# Patient Record
Sex: FEMALE | Race: WHITE | URBAN - METROPOLITAN AREA
[De-identification: names, ages, dates, MRNs, and addresses within clinical notes are randomized per-mention and may not be internally consistent; named-entity substitution may affect disease eponyms.]

---

## 2017-01-01 ENCOUNTER — HOSPITAL ENCOUNTER (INPATIENT)
Dept: MEDSURG UNIT | Facility: CLINIC | Age: 82
Discharge: NURSING, REHAB OR LONG-TERM CARE | End: 2017-07-19
Attending: FAMILY MEDICINE | Admitting: FAMILY MEDICINE

## 2017-01-01 ENCOUNTER — MEDICAL CORRESPONDENCE (OUTPATIENT)
Dept: HEALTH INFORMATION MANAGEMENT | Facility: CLINIC | Age: 82
End: 2017-01-01

## 2017-01-01 ENCOUNTER — OFFICE VISIT (OUTPATIENT)
Dept: FAMILY MEDICINE | Facility: CLINIC | Age: 82
End: 2017-01-01

## 2017-01-01 ENCOUNTER — DOCUMENTATION ONLY (OUTPATIENT)
Dept: FAMILY MEDICINE | Facility: CLINIC | Age: 82
End: 2017-01-01

## 2017-01-01 ENCOUNTER — ANESTHESIA - HEALTHEAST (OUTPATIENT)
Dept: SURGERY | Facility: CLINIC | Age: 82
End: 2017-01-01

## 2017-01-01 ENCOUNTER — SURGERY - HEALTHEAST (OUTPATIENT)
Dept: SURGERY | Facility: CLINIC | Age: 82
End: 2017-01-01

## 2017-01-01 ENCOUNTER — TRANSFERRED RECORDS (OUTPATIENT)
Dept: HEALTH INFORMATION MANAGEMENT | Facility: CLINIC | Age: 82
End: 2017-01-01

## 2017-01-01 ENCOUNTER — TELEPHONE (OUTPATIENT)
Dept: FAMILY MEDICINE | Facility: CLINIC | Age: 82
End: 2017-01-01

## 2017-01-01 ENCOUNTER — OFFICE VISIT - HEALTHEAST (OUTPATIENT)
Dept: GERIATRICS | Facility: CLINIC | Age: 82
End: 2017-01-01

## 2017-01-01 VITALS
TEMPERATURE: 97.8 F | SYSTOLIC BLOOD PRESSURE: 105 MMHG | DIASTOLIC BLOOD PRESSURE: 67 MMHG | OXYGEN SATURATION: 92 % | HEART RATE: 88 BPM

## 2017-01-01 VITALS — DIASTOLIC BLOOD PRESSURE: 65 MMHG | SYSTOLIC BLOOD PRESSURE: 111 MMHG | HEART RATE: 73 BPM | TEMPERATURE: 98.1 F

## 2017-01-01 DIAGNOSIS — N18.9 CHRONIC KIDNEY DISEASE: ICD-10-CM

## 2017-01-01 DIAGNOSIS — J44.9 CHRONIC OBSTRUCTIVE PULMONARY DISEASE, UNSPECIFIED COPD TYPE (H): Primary | ICD-10-CM

## 2017-01-01 DIAGNOSIS — K21.9 GASTROESOPHAGEAL REFLUX DISEASE WITHOUT ESOPHAGITIS: ICD-10-CM

## 2017-01-01 DIAGNOSIS — F33.9 EPISODE OF RECURRENT MAJOR DEPRESSIVE DISORDER, UNSPECIFIED DEPRESSION EPISODE SEVERITY (H): Primary | ICD-10-CM

## 2017-01-01 DIAGNOSIS — I10 ESSENTIAL HYPERTENSION, BENIGN: ICD-10-CM

## 2017-01-01 DIAGNOSIS — Z99.81 DEPENDENCE ON SUPPLEMENTAL OXYGEN: ICD-10-CM

## 2017-01-01 DIAGNOSIS — J44.9 COPD, MODERATE (H): Primary | ICD-10-CM

## 2017-01-01 DIAGNOSIS — I25.10 ATHEROSCLEROSIS OF CORONARY ARTERY OF NATIVE HEART WITHOUT ANGINA PECTORIS, UNSPECIFIED VESSEL OR LESION TYPE: ICD-10-CM

## 2017-01-01 DIAGNOSIS — Z90.5 SINGLE KIDNEY: ICD-10-CM

## 2017-01-01 DIAGNOSIS — R53.83 FATIGUE, UNSPECIFIED TYPE: Primary | ICD-10-CM

## 2017-01-01 DIAGNOSIS — J44.9 MODERATE COPD (CHRONIC OBSTRUCTIVE PULMONARY DISEASE) (H): ICD-10-CM

## 2017-01-01 DIAGNOSIS — N39.41 URGE INCONTINENCE OF URINE: Primary | ICD-10-CM

## 2017-01-01 DIAGNOSIS — J44.9 COPD, MODERATE (H): ICD-10-CM

## 2017-01-01 DIAGNOSIS — R35.89 POLYURIA: ICD-10-CM

## 2017-01-01 DIAGNOSIS — Z86.79 HISTORY OF HYPERTENSION: ICD-10-CM

## 2017-01-01 DIAGNOSIS — S72.002D HIP FRACTURE, LEFT, CLOSED, WITH ROUTINE HEALING, SUBSEQUENT ENCOUNTER: ICD-10-CM

## 2017-01-01 DIAGNOSIS — M19.90 OSTEOARTHRITIS, UNSPECIFIED OSTEOARTHRITIS TYPE, UNSPECIFIED SITE: ICD-10-CM

## 2017-01-01 DIAGNOSIS — R45.89 DEPRESSED MOOD: ICD-10-CM

## 2017-01-01 DIAGNOSIS — R53.1 GENERALIZED WEAKNESS: ICD-10-CM
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
## 2017-01-01 DIAGNOSIS — I10 BENIGN ESSENTIAL HYPERTENSION: ICD-10-CM

## 2017-01-01 DIAGNOSIS — D62 ACUTE BLOOD LOSS ANEMIA: ICD-10-CM

## 2017-01-01 DIAGNOSIS — G25.81 RESTLESS LEGS SYNDROME (RLS): ICD-10-CM

## 2017-01-01 DIAGNOSIS — J43.1 PANLOBULAR EMPHYSEMA (H): ICD-10-CM

## 2017-01-01 DIAGNOSIS — G40.909 SEIZURE DISORDER (H): ICD-10-CM

## 2017-01-01 DIAGNOSIS — R07.9 CHEST PAIN, UNSPECIFIED TYPE: ICD-10-CM

## 2017-01-01 DIAGNOSIS — E87.0 HYPERNATREMIA: ICD-10-CM

## 2017-01-01 DIAGNOSIS — D53.9 MACROCYTIC ANEMIA: ICD-10-CM

## 2017-01-01 DIAGNOSIS — J95.821 RESPIRATORY FAILURE, POST-OPERATIVE (H): ICD-10-CM

## 2017-01-01 DIAGNOSIS — D32.0 BENIGN NEOPLASM OF CEREBRAL MENINGES (H): ICD-10-CM

## 2017-01-01 DIAGNOSIS — J93.9 PNEUMOTHORAX ON RIGHT: ICD-10-CM

## 2017-01-01 DIAGNOSIS — S72.002A HIP FRACTURE, LEFT, CLOSED, INITIAL ENCOUNTER (H): ICD-10-CM

## 2017-01-01 DIAGNOSIS — D69.6 THROMBOCYTOPENIA (H): ICD-10-CM

## 2017-01-01 LAB
ALBUMIN SERPL-MCNC: 4 MG/DL (ref 3.3–4.9)
ALP SERPL-CCNC: 77.8 U/L (ref 40–150)
ALT SERPL-CCNC: 0 U/L (ref 0–45)
AORTIC ROOT: 3.1 CM
AORTIC VALVE MEAN VELOCITY: 89.8 CM/S
ASCENDING AORTA: 3.3 CM
AST SERPL-CCNC: 11.2 U/L (ref 0–45)
ATRIAL RATE - MUSE: 103 BPM
ATRIAL RATE - MUSE: 55 BPM
ATRIAL RATE - MUSE: 90 BPM
ATRIAL RATE - MUSE: 91 BPM
ATRIAL RATE - MUSE: 93 BPM
AV DIMENSIONLESS INDEX VTI: 0.9
AV MEAN GRADIENT: 4 MMHG
AV PEAK GRADIENT: 11.2 MMHG
AV VALVE AREA: 2.8 CM2
AV VELOCITY RATIO: 0.8
BACTERIA: NORMAL
BASOPHILS # BLD AUTO: 0 THOU/UL (ref 0–0.2)
BASOPHILS NFR BLD AUTO: 0 % (ref 0–2)
BASOPHILS NFR BLD AUTO: 0 % (ref 0–2)
BASOPHILS NFR BLD AUTO: 1 % (ref 0–2)
BILIRUB SERPL-MCNC: 0.4 MG/DL (ref 0.2–1.3)
BILIRUBIN UR: NEGATIVE
BLD PROD TYP BPU: NORMAL
BLOOD EXPIRATION DATE: NORMAL
BLOOD TYPE: 5100
BLOOD UR: NEGATIVE
BSA FOR ECHO PROCEDURE: 1.75 M2
BUN SERPL-MCNC: 17.4 MG/DL (ref 7–19)
BUN SERPL-MCNC: 26.7 MG/DL (ref 7–19)
CALCIUM SERPL-MCNC: 8.6 MG/DL (ref 8.5–10.1)
CALCIUM SERPL-MCNC: 8.9 MG/DL (ref 8.5–10.1)
CASTS: NORMAL /LPF
CHLORIDE SERPLBLD-SCNC: 100.7 MMOL/L (ref 98–110)
CHLORIDE SERPLBLD-SCNC: 101.6 MMOL/L (ref 98–110)
CHOLEST SERPL-MCNC: 181.9 MG/DL (ref 0–200)
CHOLEST/HDLC SERPL: 3.3 {RATIO} (ref 0–5)
CO2 SERPL-SCNC: 29.3 MMOL/L (ref 20–32)
CO2 SERPL-SCNC: 30.4 MMOL/L (ref 20–32)
CODING SYSTEM: NORMAL
COMPONENT (HISTORICAL CONVERSION): NORMAL
CREAT SERPL-MCNC: 1.5 MG/DL (ref 0.5–1)
CREAT SERPL-MCNC: 1.9 MG/DL (ref 0.5–1)
CROSSMATCH: NORMAL
CRYSTAL URINE: NORMAL /LPF
CULTURE: ABNORMAL
CV BLOOD PRESSURE: NORMAL MMHG
CV ECHO HEIGHT: 66 IN
CV ECHO WEIGHT: 149 LBS
DIASTOLIC BLOOD PRESSURE - MUSE: NORMAL MMHG
DOP CALC AO PEAK VEL: 167 CM/S
DOP CALC AO VTI: 37.3 CM
DOP CALC LVOT AREA: 3.14 CM2
DOP CALC LVOT DIAMETER: 2 CM
DOP CALC LVOT PEAK VEL: 134 CM/S
DOP CALC LVOT STROKE VOLUME: 105.8 CM3
DOP CALCLVOT PEAK VEL VTI: 33.7 CM
EJECTION FRACTION: 71 % (ref 55–75)
EOSINOPHIL # BLD AUTO: 0 THOU/UL (ref 0–0.4)
EOSINOPHIL # BLD AUTO: 0.1 THOU/UL (ref 0–0.4)
EOSINOPHIL # BLD AUTO: 0.1 THOU/UL (ref 0–0.4)
EOSINOPHIL NFR BLD AUTO: 1 % (ref 0–6)
EOSINOPHIL NFR BLD AUTO: 1 % (ref 0–6)
EOSINOPHIL NFR BLD AUTO: 3 % (ref 0–6)
EPITHELIAL CELLS UR: NORMAL /LPF (ref 0–2)
ERYTHROCYTE [DISTWIDTH] IN BLOOD BY AUTOMATED COUNT: 12.9 % (ref 11–14.5)
ERYTHROCYTE [DISTWIDTH] IN BLOOD BY AUTOMATED COUNT: 13.2 % (ref 11–14.5)
ERYTHROCYTE [DISTWIDTH] IN BLOOD BY AUTOMATED COUNT: 13.7 % (ref 11–14.5)
FOLATE SERPL-MCNC: 5.9 NG/ML
FRACTIONAL SHORTENING: 34.8 % (ref 28–44)
GFR SERPL CREATININE-BSD FRML MDRD: 26.9 ML/MIN/1.7 M2
GFR SERPL CREATININE-BSD FRML MDRD: 35.2 ML/MIN/1.7 M2
GLUCOSE SERPL-MCNC: 105.2 MG'DL (ref 70–99)
GLUCOSE SERPL-MCNC: 94.1 MG'DL (ref 70–99)
GLUCOSE URINE: NEGATIVE
HCT VFR BLD AUTO: 27.7 % (ref 35–47)
HCT VFR BLD AUTO: 31.7 % (ref 35–47)
HCT VFR BLD AUTO: 34 % (ref 35–47)
HDLC SERPL-MCNC: 54.7 MG/DL
HGB BLD-MCNC: 10.4 G/DL (ref 12–16)
HGB BLD-MCNC: 8.4 G/DL (ref 12–16)
HGB BLD-MCNC: 9.8 G/DL (ref 12–16)
INTERPRETATION ECG - MUSE: NORMAL
INTERVENTRICULAR SEPTUM IN END DIASTOLE: 1.08 CM (ref 0.6–0.9)
ISSUE DATE AND TIME: NORMAL
IVS/PW RATIO: 1
KETONES UR QL: NEGATIVE
LA AREA 1: 17 CM2
LA AREA 2: 19.8 CM2
LAB AP CHARGES (HE HISTORICAL CONVERSION): NORMAL
LDLC SERPL CALC-MCNC: 104 MG/DL (ref 0–129)
LEFT ATRIUM LENGTH: 5 CM
LEFT ATRIUM SIZE: 3.4 CM
LEFT ATRIUM VOLUME INDEX: 32.7 ML/M2
LEFT ATRIUM VOLUME: 57.2 CM3
LEFT VENTRICLE CARDIAC INDEX: 3.3 L/MIN/M2
LEFT VENTRICLE CARDIAC OUTPUT: 5.7 L/MIN
LEFT VENTRICLE DIASTOLIC VOLUME INDEX: 43.5 CM3/M2 (ref 34–74)
LEFT VENTRICLE DIASTOLIC VOLUME: 76.1 CM3 (ref 46–106)
LEFT VENTRICLE HEART RATE: 54 BPM
LEFT VENTRICLE MASS INDEX: 97.6 G/M2
LEFT VENTRICLE SYSTOLIC VOLUME INDEX: 12.5 CM3/M2 (ref 11–31)
LEFT VENTRICLE SYSTOLIC VOLUME: 21.9 CM3 (ref 14–42)
LEFT VENTRICULAR INTERNAL DIMENSION IN DIASTOLE: 4.45 CM (ref 3.8–5.2)
LEFT VENTRICULAR INTERNAL DIMENSION IN SYSTOLE: 2.9 CM (ref 2.2–3.5)
LEFT VENTRICULAR MASS: 170.9 G
LEFT VENTRICULAR OUTFLOW TRACT MEAN GRADIENT: 3 MMHG
LEFT VENTRICULAR OUTFLOW TRACT MEAN VELOCITY: 85.4 CM/S
LEFT VENTRICULAR OUTFLOW TRACT PEAK GRADIENT: 7 MMHG
LEFT VENTRICULAR POSTERIOR WALL IN END DIASTOLE: 1.11 CM (ref 0.6–0.9)
LEUKOCYTE ESTERASE UR: ABNORMAL
LV STROKE VOLUME INDEX: 60.5 ML/M2
LYMPHOCYTES # BLD AUTO: 0.5 THOU/UL (ref 0.8–4.4)
LYMPHOCYTES # BLD AUTO: 1.1 THOU/UL (ref 0.8–4.4)
LYMPHOCYTES # BLD AUTO: 1.3 THOU/UL (ref 0.8–4.4)
LYMPHOCYTES NFR BLD AUTO: 10 % (ref 20–40)
LYMPHOCYTES NFR BLD AUTO: 25 % (ref 20–40)
LYMPHOCYTES NFR BLD AUTO: 26 % (ref 20–40)
MCH RBC QN AUTO: 31.7 PG (ref 27–34)
MCH RBC QN AUTO: 32.6 PG (ref 27–34)
MCH RBC QN AUTO: 32.9 PG (ref 27–34)
MCHC RBC AUTO-ENTMCNC: 30.3 G/DL (ref 32–36)
MCHC RBC AUTO-ENTMCNC: 30.6 G/DL (ref 32–36)
MCHC RBC AUTO-ENTMCNC: 30.9 G/DL (ref 32–36)
MCV RBC AUTO: 104 FL (ref 80–100)
MCV RBC AUTO: 105 FL (ref 80–100)
MCV RBC AUTO: 109 FL (ref 80–100)
MITRAL VALVE E/A RATIO: 0.9
MONOCYTES # BLD AUTO: 0.4 THOU/UL (ref 0–0.9)
MONOCYTES NFR BLD AUTO: 8 % (ref 2–10)
MONOCYTES NFR BLD AUTO: 8 % (ref 2–10)
MONOCYTES NFR BLD AUTO: 9 % (ref 2–10)
MUCOUS URINE: NORMAL LPF
MV AVERAGE E/E' RATIO: 16.1 CM/S
MV DECELERATION TIME: 261 MS
MV E'TISSUE VEL-LAT: 8.16 CM/S
MV E'TISSUE VEL-MED: 4.9 CM/S
MV LATERAL E/E' RATIO: 12.9
MV MEDIAL E/E' RATIO: 21.4
MV PEAK A VELOCITY: 119 CM/S
MV PEAK E VELOCITY: 105 CM/S
NEUTROPHILS # BLD AUTO: 2.7 THOU/UL (ref 2–7.7)
NEUTROPHILS # BLD AUTO: 3.1 THOU/UL (ref 2–7.7)
NEUTROPHILS # BLD AUTO: 4.4 THOU/UL (ref 2–7.7)
NEUTROPHILS NFR BLD AUTO: 63 % (ref 50–70)
NEUTROPHILS NFR BLD AUTO: 64 % (ref 50–70)
NEUTROPHILS NFR BLD AUTO: 82 % (ref 50–70)
NITRITE UR QL STRIP: POSITIVE
NUC REST DIASTOLIC VOLUME INDEX: 2385.6 LBS
NUC REST SYSTOLIC VOLUME INDEX: 66 IN
P AXIS - MUSE: -74 DEGREES
P AXIS - MUSE: 70 DEGREES
P AXIS - MUSE: 71 DEGREES
P AXIS - MUSE: 79 DEGREES
P AXIS - MUSE: 88 DEGREES
PATH REPORT.COMMENTS IMP SPEC: NORMAL
PATH REPORT.COMMENTS IMP SPEC: NORMAL
PATH REPORT.FINAL DX SPEC: NORMAL
PATH REPORT.MICROSCOPIC SPEC OTHER STN: ABNORMAL
PATH REPORT.MICROSCOPIC SPEC OTHER STN: NORMAL
PATH REPORT.RELEVANT HX SPEC: NORMAL
PH UR STRIP: 5.5 [PH] (ref 5–7)
PLATELET # BLD AUTO: 125 THOU/UL (ref 140–440)
PLATELET # BLD AUTO: 125 THOU/UL (ref 140–440)
PLATELET # BLD AUTO: 147 THOU/UL (ref 140–440)
PMV BLD AUTO: 10.3 FL (ref 8.5–12.5)
PMV BLD AUTO: 10.5 FL (ref 8.5–12.5)
PMV BLD AUTO: 11.4 FL (ref 8.5–12.5)
POTASSIUM SERPL-SCNC: 4.7 MMOL/DL (ref 3.2–4.6)
POTASSIUM SERPL-SCNC: 5.1 MMOL/DL (ref 3.2–4.6)
PR INTERVAL - MUSE: 144 MS
PR INTERVAL - MUSE: 148 MS
PR INTERVAL - MUSE: 164 MS
PR INTERVAL - MUSE: 168 MS
PR INTERVAL - MUSE: 98 MS
PROT SERPL-MCNC: 6.1 G/DL (ref 6.8–8.8)
PROTEIN UR: ABNORMAL
QRS DURATION - MUSE: 70 MS
QRS DURATION - MUSE: 80 MS
QRS DURATION - MUSE: 84 MS
QRS DURATION - MUSE: 88 MS
QRS DURATION - MUSE: 92 MS
QT - MUSE: 366 MS
QT - MUSE: 372 MS
QT - MUSE: 372 MS
QT - MUSE: 424 MS
QT - MUSE: 504 MS
QTC - MUSE: 455 MS
QTC - MUSE: 455 MS
QTC - MUSE: 457 MS
QTC - MUSE: 482 MS
QTC - MUSE: 555 MS
R AXIS - MUSE: 46 DEGREES
R AXIS - MUSE: 49 DEGREES
R AXIS - MUSE: 63 DEGREES
R AXIS - MUSE: 69 DEGREES
R AXIS - MUSE: 84 DEGREES
RBC # BLD AUTO: 2.55 MILL/UL (ref 3.8–5.4)
RBC # BLD AUTO: 3.01 MILL/UL (ref 3.8–5.4)
RBC # BLD AUTO: 3.28 MILL/UL (ref 3.8–5.4)
RBC URINE: NORMAL /HPF
SODIUM SERPL-SCNC: 138.2 MMOL/L (ref 132–142)
SODIUM SERPL-SCNC: 138.4 MMOL/L (ref 132–142)
SP GR UR STRIP: 1.01
STATUS (HISTORICAL CONVERSION): NORMAL
SYSTOLIC BLOOD PRESSURE - MUSE: NORMAL MMHG
T AXIS - MUSE: -75 DEGREES
T AXIS - MUSE: 42 DEGREES
T AXIS - MUSE: 54 DEGREES
T AXIS - MUSE: 59 DEGREES
T AXIS - MUSE: 81 DEGREES
TRICUSPID VALVE ANULAR PLANE SYSTOLIC EXCURSION: 2.5 CM
TRIGL SERPL-MCNC: 118.1 MG/DL (ref 0–150)
TSH SERPL DL<=0.05 MIU/L-ACNC: 1.6 UIU/ML (ref 0.3–5)
UNIT ABO/RH (HISTORICAL CONVERSION): NORMAL
UNIT NUMBER: NORMAL
UROBILINOGEN UR STRIP-ACNC: ABNORMAL
VENTRICULAR RATE- MUSE: 103 BPM
VENTRICULAR RATE- MUSE: 55 BPM
VENTRICULAR RATE- MUSE: 90 BPM
VENTRICULAR RATE- MUSE: 91 BPM
VENTRICULAR RATE- MUSE: 93 BPM
VIT B12 SERPL-MCNC: 226 PG/ML (ref 213–816)
VLDL CHOLESTEROL: 23.6 MG/DL (ref 7–32)
WBC # BLD AUTO: 4.3 THOU/UL (ref 4–11)
WBC # BLD AUTO: 4.9 THOU/UL (ref 4–11)
WBC URINE: NORMAL /HPF
WBC: 5.4 THOU/UL (ref 4–11)

## 2017-01-01 RX ORDER — ROPINIROLE 2 MG/1
TABLET, FILM COATED ORAL
Qty: 90 TABLET | Refills: 3 | Status: SHIPPED | OUTPATIENT
Start: 2017-01-01

## 2017-01-01 RX ORDER — ROPINIROLE 0.5 MG/1
TABLET, FILM COATED ORAL
Qty: 90 TABLET | Refills: 3 | Status: SHIPPED | OUTPATIENT
Start: 2017-01-01

## 2017-01-01 RX ORDER — ESCITALOPRAM OXALATE 10 MG/1
10 TABLET ORAL DAILY
Qty: 30 TABLET | Refills: 1 | Status: SHIPPED | OUTPATIENT
Start: 2017-01-01

## 2017-01-01 RX ORDER — ALBUTEROL SULFATE 90 UG/1
AEROSOL, METERED RESPIRATORY (INHALATION)
Qty: 1 INHALER | Refills: 11 | Status: SHIPPED | OUTPATIENT
Start: 2017-01-01

## 2017-01-01 RX ORDER — ACETAMINOPHEN 500 MG
500-1000 TABLET ORAL 3 TIMES DAILY PRN
Qty: 180 TABLET | Refills: 11 | Status: SHIPPED | OUTPATIENT
Start: 2017-01-01

## 2017-01-01 RX ORDER — IPRATROPIUM BROMIDE AND ALBUTEROL SULFATE 2.5; .5 MG/3ML; MG/3ML
1 SOLUTION RESPIRATORY (INHALATION) EVERY 6 HOURS PRN
Qty: 360 ML | Refills: 11 | Status: SHIPPED | OUTPATIENT
Start: 2017-01-01

## 2017-01-01 RX ORDER — SULFAMETHOXAZOLE/TRIMETHOPRIM 800-160 MG
1 TABLET ORAL 2 TIMES DAILY
Qty: 14 TABLET | Refills: 0 | Status: SHIPPED | OUTPATIENT
Start: 2017-01-01

## 2017-01-01 RX ORDER — TOLTERODINE 2 MG/1
2 CAPSULE, EXTENDED RELEASE ORAL DAILY
Qty: 30 CAPSULE | Refills: 1 | Status: SHIPPED | OUTPATIENT
Start: 2017-01-01

## 2017-01-01 RX ORDER — LEVETIRACETAM 500 MG/1
500 TABLET ORAL 2 TIMES DAILY
Qty: 60 TABLET | Refills: 3 | Status: SHIPPED | OUTPATIENT
Start: 2017-01-01

## 2017-01-01 RX ORDER — ACETAMINOPHEN 500 MG
500-1000 TABLET ORAL 3 TIMES DAILY PRN
Qty: 180 TABLET | Refills: 11 | Status: SHIPPED | OUTPATIENT
Start: 2017-01-01 | End: 2017-01-01

## 2017-01-01 RX ORDER — BUDESONIDE AND FORMOTEROL FUMARATE DIHYDRATE 80; 4.5 UG/1; UG/1
2 AEROSOL RESPIRATORY (INHALATION) 2 TIMES DAILY
Qty: 1 INHALER | Refills: 11 | Status: SHIPPED | OUTPATIENT
Start: 2017-01-01

## 2017-01-01 RX ORDER — TIOTROPIUM BROMIDE 18 UG/1
CAPSULE ORAL; RESPIRATORY (INHALATION)
Qty: 30 CAPSULE | Refills: 11 | Status: SHIPPED | OUTPATIENT
Start: 2017-01-01

## 2017-01-01 RX ORDER — AMLODIPINE BESYLATE 10 MG/1
10 TABLET ORAL DAILY
Qty: 90 TABLET | Refills: 3 | Status: SHIPPED | OUTPATIENT
Start: 2017-01-01 | End: 2017-01-01

## 2017-01-01 ASSESSMENT — MIFFLIN-ST. JEOR
SCORE: 1148.48
SCORE: 1107.65
SCORE: 1128.06
SCORE: 1148.48
SCORE: 1159.36
SCORE: 1177.96
SCORE: 1128.06
SCORE: 1143.94
SCORE: 1115.82
SCORE: 1143.94
SCORE: 1107.65
SCORE: 1115.82
SCORE: 1177.96
SCORE: 1159.36

## 2017-01-11 NOTE — PROGRESS NOTES
Subjective: Jhoana Ramirez is a 82 year old who is seen at home for follow up visit today.  Seen at [address] [address] .    Also present at visit include: none  Acute concerns today include:     Breathing: she has a history of COPD and says he breathing is steadily worsening.  She has not had any major exacerbations in the past several months, but has felt more short of breath when walking around.  She uses her duo-neb machine 2-3 times/day which helps.  She has a chronic cough that is not worse lately.  She still smokes 1/2 ppd and says she knows she needs to quit.  Balance: she says she has had trouble walking around for a long time due to her balance.  Fortunately, her scooter was approved 1 month ago and she has in it her apartment now which has been very helpful.    Chronic and Past Medical Problems include:  Patient Active Problem List   Diagnosis     Acute myocardial infarction (H)     Essential hypertension, benign     Coronary atherosclerosis     Depressive disorder, not elsewhere classified     Esophageal reflux     Female stress incontinence     Benign neoplasm of cerebral meninges (H)     Tobacco use disorder     Osteoarthritis     Restless legs syndrome (RLS)     Health Care Home     COPD, moderate (H)     Macrocytic anemia     Family History        Negative family history of: DIABETES, Coronary Artery Disease, Hypertension, Hyperlipidemia, CEREBROVASCULAR DISEASE, Breast Cancer, Colon Cancer, Prostate Cancer, Other Cancer, Depression, Anxiety Disorder, MENTAL ILLNESS, Substance Abuse, Anesthesia Reaction, Asthma, OSTEOPOROSIS, Genetic Disorder, Thyroid Disease, Obesity, Unknown/Adopted        Social History:   Current activities:  Mainly stays in her home, does hang out with her son a lot when he is not working and visits with friends in her building  Support services:  PCA for 5-6 hours 6 days per week who helps with cooking, cleaning, dishes etc...  Currently living  family/friends:  Son lives with her  PMHX/PSHX/MEDS/ALLERGIES/SHX/FHX reviewed and updated in Epic.   MEDICATION LIST:  Current Outpatient Prescriptions   Medication     melatonin 3 MG tablet     levETIRAcetam (KEPPRA) 500 MG tablet     tolterodine (DETROL LA) 2 MG 24 hr capsule     albuterol (ALBUTEROL) 108 (90 BASE) MCG/ACT inhaler     ferrous sulfate (IRON) 325 (65 FE) MG tablet     amLODIPine (NORVASC) 10 MG tablet     rOPINIRole (REQUIP) 2 MG tablet     rOPINIRole (REQUIP) 0.5 MG tablet     Incontinence Supply Disposable (CONFIDENCE FITTED BRIEF REG) MISC     acetaminophen (TYLENOL) 500 MG tablet     ipratropium - albuterol 0.5 mg/2.5 mg/3 mL (DUONEB) 0.5-2.5 (3) MG/3ML nebulization     No current facility-administered medications for this visit.     Medications are managed by:  SELF, FAMILY, HOME NURSE  Patient uses a pill box to manage their medications:  Yes  Patient has questions, concerns, or potential side effects/interactions from their medications:  No  GERIATRIC ROS:    Do you have difficulty getting around, watching TV or reading because of poor eyesight? Yes / No   Can you hear normal conversational voice? Yes   Do you use hearing aides? No   Do you have problems with your memory? No   Do you often feel sad or depressed? No   Have you unintentionally lost weight in the last 6 months? No   Do you have trouble with control of your bladder? No   Do you have trouble with control of your bowels? No   Have you had any falls in the past year? No    GENERAL ROS:   General: No unexplained weight gain or loss; adequate sleep pattern.  Resp: No hemoptysis.  Worsening SOB as above, chronic cough  GI: No worsening constipation, no diarrhea, no nausea or vomiting  : Voiding independently with no significant incontinence   Skin: No areas of new skin breakdown or worrisome rashes  Extremities:  No pain, extremity weakness. +balance issues as above    Objective: Most recent weight:  158lbs  Gen: Adult female  "sitting on couch, NAD   HEENT: Head is atraumatic, decent dentition, O2 by NC in place  CV: RRR - no murmurs, rubs, or gallups,   Pulm: Mild expiratory wheezing throughout, no increased WOB  ABD: soft, nontender, BS intact  Extrem: no cyanosis, edema   Psych: Euthymic  Neuro: Pt is able to ambulate independently to scooter    Preventative Screening:   Vaccinations reviewed and up to date no - needs flu shot  Get up and Go test:  Not completed.  Additional Recommended Screening: none    Assessment/ Plan:  COPD: No acute exacerbation, but slowly worsening SOB over last year and is now up to 5-6L NC 24 hrs/day.  She says she has not been on maintenance inhalers because \"she only has one kidney.\"  Has an appointment with Dr. Lopez on 1/11/17, will have her discuss additional inhaler such as Symbicort at that time.  Consider repeat PFTs.  Balance issues: chronic problem, now able to get around better with her scooter.  No additional work-up at this time.    RECOMMENDED FOLLOW UP:  2 months.    Level of Service:  54555    Total of 30 minutes was spent in face to face contact with patient with > 50% in counseling and coordination of care.  Options for treatment and/or follow-up care were reviewed with the patient. Jhoana Ramirez was engaged and actively involved in the decision making process. She verbalized understanding of the options discussed and was satisfied with the final plan.    Seen with Dr. Moody.    Jose Ram, DO PGY2  [facility]    "

## 2017-01-11 NOTE — PROGRESS NOTES
Female Physical Note    Concerns today: increase oxygen, short of breath        ROS:  CONSTITUTIONAL: no fatigue, no unexpected change in weight  SKIN: no worrisome rashes, no worrisome moles, no worrisome lesions  EYES: no acute vision problems or changes  ENT: no ear problems, no mouth problems, no throat problems  RESP:sob with any exertion  CV: no chest pain, no palpitations, no new or worsening peripheral edema  GI: occasional heartburn, no n/v, no hematochezia   female: incontinence-urge and polyuria  MS: no claudication, no myalgias, no joint aches  NEURO: neuropathic type pain in her feet 2-3 times per week  ENDOCRINE: no temperature intolerance, no skin/hair changes  HEME: no easy bruising, no bleeding problems  PSYCHIATRIC: NEGATIVE for changes in mood or affect     No LMP recorded. Patient is postmenopausal.   STD History: Neg  Last Pap Smear Date: pt doesn't know   Abnormal Pap History: None    Patient Active Problem List   Diagnosis     Acute myocardial infarction (H)     Essential hypertension, benign     Coronary atherosclerosis     Depressive disorder, not elsewhere classified     Esophageal reflux     Female stress incontinence     Benign neoplasm of cerebral meninges (H)     Tobacco use disorder     Osteoarthritis     Restless legs syndrome (RLS)     Health Care Home     COPD, moderate (H)     Macrocytic anemia       Current Outpatient Prescriptions   Medication Sig Dispense Refill     tolterodine (DETROL LA) 2 MG 24 hr capsule Take 1 capsule (2 mg) by mouth daily 30 capsule 1     omeprazole (PRILOSEC) 20 MG CR capsule One pill by mouth daily 30 capsule 12     tiotropium (SPIRIVA HANDIHALER) 18 MCG capsule Inhale contents of one capsule daily. 30 capsule 11     melatonin 3 MG tablet Take 1 tablet (3 mg) by mouth nightly as needed for sleep 30 tablet 11     levETIRAcetam (KEPPRA) 500 MG tablet Take 1 tablet (500 mg) by mouth 2 times daily 60 tablet 3     albuterol (ALBUTEROL) 108 (90 BASE)  MCG/ACT inhaler Inhale 1 to 2 puff every 4 to 6 hours as needed 1 Inhaler 11     ferrous sulfate (IRON) 325 (65 FE) MG tablet Take 1 tablet (325 mg) by mouth daily (with breakfast) 30 tablet 2     amLODIPine (NORVASC) 10 MG tablet Take 1 tablet (10 mg) by mouth daily 90 tablet 3     rOPINIRole (REQUIP) 2 MG tablet Take 1 tablet  at bedtime along with 0.5 mg tablet for a total dose of 2.5mg daily 90 tablet 3     rOPINIRole (REQUIP) 0.5 MG tablet Take 1 tablet  at bedtime along with 2 mg tablet for a total dose of 2.5mg daily 90 tablet 3     acetaminophen (TYLENOL) 500 MG tablet Take 1-2 tablets (500-1,000 mg) by mouth 3 times daily as needed 180 tablet 11     ipratropium - albuterol 0.5 mg/2.5 mg/3 mL (DUONEB) 0.5-2.5 (3) MG/3ML nebulization Take 1 vial (3 mLs) by nebulization every 6 hours as needed 360 mL 11     Incontinence Supply Disposable (CONFIDENCE FITTED BRIEF REG) MISC Use on a daily basis for incontinence 10 each 99       Past Medical History   Diagnosis Date     COPD (chronic obstructive pulmonary disease) (H)      Hypertension      Malignant neoplasm (H)         Family History        Negative family history of: DIABETES, Coronary Artery Disease, Hypertension, Hyperlipidemia, CEREBROVASCULAR DISEASE, Breast Cancer, Colon Cancer, Prostate Cancer, Other Cancer, Depression, Anxiety Disorder, MENTAL ILLNESS, Substance Abuse, Anesthesia Reaction, Asthma, OSTEOPOROSIS, Genetic Disorder, Thyroid Disease, Obesity, Unknown/Adopted          Problem List Medication List and Allergy List were reviewed.    Patient is an established patient of this clinic..    Social History   Substance Use Topics     Smoking status: Former Smoker -- 1.00 packs/day     Types: Cigars     Smokeless tobacco: Not on file     Alcohol Use: No       Children ? yes - healthy    Has anyone hurt you physically, for example by pushing, hitting, slapping or kicking you or forcing you to have sex? Denies  Do you feel threatened or  controlled by a partner, ex-partner or anyone in your life? Denies    RISK BEHAVIORS AND HEALTHY HABITS:  Tobacco Use/Smokin-2 ppd  Illicit Drug Use: None  Do you use alcohol? No  Diet (5-7 servings of fruits/veg daily): No   Exercise (30 min accumulated most days):No  Dental Care: No   Calcium 1500 mg/d:  No  Seat Belt Use: Yes     Cholesterol Level (>46 yo or at risk):  Recommended and patient accepted testing. and Dexa Scan (women>65 yrs or risk = that of a 64yo woman):  Recommended and patient declined testing.  Breast CA Screening (>39 yo or 10 y before 1st degree relative diagnosis): Recommended and patient declined testing. and Lung CA Screening with low dose CT (55 - 80, with >= 30 ppy smoking history who are current smokers or quit within last 15y - annual low dose CT) Recommended and patient declined testing.  CV Risk based on Pooled Cohort Risk:pending   Pooled Cohort Risk Calculator    Immunization History   Administered Date(s) Administered     Influenza (IIV3) 10/01/2008, 2011     TD (ADULT, 7+) 2001     Tdap (Adacel,Boostrix) 2008    Reviewed Immunization Record Today    EXAMINATION:   /67 mmHg  Pulse 88  Temp(Src) 97.8  F (36.6  C) (Oral)  SpO2 92%  GENERAL: healthy, alert and no distress  EYES: Eyes grossly normal to inspection, extraocular movements - intact, and PERRL  HENT: ear canals and TM's normal, nose normal, oral mucosa edentulous with two small shallow ulcers on lower gumline  NECK: no tenderness, no adenopathy, no asymmetry, no masses, no stiffness; thyroid- normal to palpation  RESP: prolonged expiratory phase  CV: regular rates and rhythm, normal S1 S2, no S3 or S4 and no murmur, no click or rub -  ABDOMEN: soft, no tenderness, no  hepatosplenomegaly, no masses, normal bowel sounds  MS: extremities- no gross deformities noted, no edema  SKIN: no suspicious lesions, no rashes  NEURO: strength and tone- normal, sensory exam- grossly normal, mentation-  intact, speech- normal, reflexes- symmetric  BACK: no CVA tenderness, no paralumbar tenderness  PSYCH: Alert and oriented times 3; speech- coherent , normal rate and volume; able to articulate logical thoughts, able to abstract reason, no tangential thoughts, no hallucinations or delusions, affect- normal  LYMPHATICS: ant. cervical- normal, post. cervical- normal, axillary- normal, supraclavicular- normal, inguinal- normal    ASSESSMENT:  1. COPD  2. HTN  3. CAD  4. Polyuria  5. anemia    PLAN:  1. Check UA, CBC, BMP, and lipids  2. Start Spiriva, increase O2 to 4.5 L/hr as needed  3. Refill other meds  4. Will have team see pt on home visit schedule.

## 2017-01-12 NOTE — TELEPHONE ENCOUNTER
[facility] phone call message- general phone call:    Reason for call: the Pt called to ask what to do. The medication spiriva is a medication she cant take and needs a different one     Return call needed: Yes    OK to leave a message on voice mail? Yes    Primary language: English      needed? No    Call taken on January 12, 2017 at 12:52 PM by Fabien Leos

## 2017-01-12 NOTE — PROGRESS NOTES
Preceptor attestation:  Patient seen and discussed with the resident 1/10/2017.  Assessment and plan reviewed with resident and agreed upon.  Supervising physician: Mateus Moody  [facility]

## 2017-03-08 NOTE — PROGRESS NOTES
Subjective: Jhoana Ramirez is a 83 year old who is seen at home for follow up visit today.  Seen at [address] [facility]  [address] [place] [id number] .    Also present at visit include: none    Acute concerns today include: mood    - has had decreased mood and just felt blah overall for last few months. Decreased energy and desire to do things. There are some group activities where she lives but she does not have a lot in common with her neighbors she states. No trouble with sleep or appetite. Says she has not been treated for depression in the past. No thoughts of self harm.    - Hx COPD and says breathing is similar to how it was 2 months ago. No ER visits or hospitalizations for breathing over the winter. She can only walk around the room a few times before getting short of breath which she says is about the same as 2 months ago. She says she is on a few liters of O2 24 hr a day but looking at her machine it looks like she is on 4.5 L a day.   She still smokes about 1/2 ppd. Currently she is doing duo-nebs BID and albuterol inhaler 2-3 times a day. She did not start the Spiriva that she was given a few months ago because she does not think it will be safe with her one kidney.    Chronic and Past Medical Problems include:  Patient Active Problem List   Diagnosis     Acute myocardial infarction (H)     Essential hypertension, benign     Coronary atherosclerosis     Depressive disorder, not elsewhere classified     Esophageal reflux     Female stress incontinence     Benign neoplasm of cerebral meninges (H)     Tobacco use disorder     Osteoarthritis     Restless legs syndrome (RLS)     Health Care Home     COPD, moderate (H)     Macrocytic anemia     Family History        Negative family history of: DIABETES, Coronary Artery Disease, Hypertension, Hyperlipidemia, CEREBROVASCULAR DISEASE, Breast Cancer, Colon Cancer, Prostate Cancer, Other Cancer, Depression, Anxiety Disorder, MENTAL ILLNESS, Substance Abuse,  Anesthesia Reaction, Asthma, OSTEOPOROSIS, Genetic Disorder, Thyroid Disease, Obesity, Unknown/Adopted        Social History:   Current activities:  Mainly confined to her apartment  Support services:  PCA services daily for 5 hrs a day and son lives with her  Currently living family/friends:  lives with son  PMHX/PSHX/MEDS/ALLERGIES/SHX/FHX reviewed and updated in Epic.   MEDICATION LIST:  Current Outpatient Prescriptions   Medication     amLODIPine (NORVASC) 10 MG tablet     rOPINIRole (REQUIP) 0.5 MG tablet     rOPINIRole (REQUIP) 2 MG tablet     albuterol (ALBUTEROL) 108 (90 BASE) MCG/ACT Inhaler     sulfamethoxazole-trimethoprim (BACTRIM DS/SEPTRA DS) 800-160 MG per tablet     tolterodine (DETROL LA) 2 MG 24 hr capsule     omeprazole (PRILOSEC) 20 MG CR capsule     tiotropium (SPIRIVA HANDIHALER) 18 MCG capsule     melatonin 3 MG tablet     levETIRAcetam (KEPPRA) 500 MG tablet     ferrous sulfate (IRON) 325 (65 FE) MG tablet     Incontinence Supply Disposable (CONFIDENCE FITTED BRIEF REG) MISC     acetaminophen (TYLENOL) 500 MG tablet     ipratropium - albuterol 0.5 mg/2.5 mg/3 mL (DUONEB) 0.5-2.5 (3) MG/3ML nebulization     No current facility-administered medications for this visit.      Medications are managed by:  SELF, FAMILY, HOME NURSE  Patient uses a pill box to manage their medications:  Yes   Patient has questions, concerns, or potential side effects/interactions from their medications:  Yes (see above)  GERIATRIC ROS:    Do you have difficulty getting around, watching TV or reading because of poor eyesight?  No   Can you hear normal conversational voice? Yes    Do you use hearing aides? No   Do you have problems with your memory? Yes   Do you often feel sad or depressed? Yes  Have you unintentionally lost weight in the last 6 months?  No   Do you have trouble with control of your bladder? Yes, wears depends  Do you have trouble with control of your bowels?  No   Have you had any falls in the past  year? No    GENERAL ROS:   General: No unexplained weight gain or loss; adequate sleep pattern.  Resp: See above in HPI  GI: No worsening constipation, no diarrhea  : Incontinence is stable from last visit, still using depends  Skin: No areas of new skin breakdown or worrisome rashes  Extremities:  No pain, extremity weakness, has recently gotten scooter for balance troubles    Objective: HR 80, RR 18,   Gen: Well nourished and in NAD   HEENT: Head is atraumatic, average dentition  CV: RRR - no murmurs, rubs, or gallups,   Pulm: CTAB, no wheezes/rales/rhonchi, poor air movement overall  ABD: soft, nontender, BS intact  Extrem: no cyanosis, edema or clubbing   Psych: Euthymic  Neuro: no UE weakness.    Preventative Screening:   Vaccinations reviewed and needs pneumovax         Assessment/ Plan:    Major depressive disorder: Could be an element of seasonal affective disorder. Will start low dose Lexapro and off patient therapy if she desires. Also encourage patient to look into adult  activities if she'd be able to tolerate them with her breathing.    COPD: Would recommend that she restart Spiriva daily as I do not see on review of Up to Date that it should be a concern even with poor kidney function. Cont duo-nebs BID and albuterol inhaler as needed although hopefully wouldn't need albuterol inhaler as often is she would use Spiriva regularly    RECOMMENDED FOLLOW UP:  2 months.    Level of Service:  46841    Total of 40 minutes was spent in face to face contact with patient with > 50% in counseling and coordination of care.  Options for treatment and/or follow-up care were reviewed with the patient. Jhoana Ramirez was engaged and actively involved in the decision making process. She verbalized understanding of the options discussed and was satisfied with the final plan.    Staffed with Senthil Turner

## 2017-03-10 NOTE — PROGRESS NOTES
"Visit to the client's home for annual health risk assessment and PCA assessment on 03/09/17.  An  was not needed.    Current situation/living environment/hospitalization: Client lives in an apartment with her adult son. Home somewhat disheveled and smells heavily of smoke due to client smoking Discussed the risks of client smoking while using oxygen but client is not interested in cessation at this time.     Activities of daily living (ADL)/instrumental activities of daily living (IADL) and functional issues: D/t clients SOB and dizziness she is dependent with all IADL's and ADL's. She has PCA in place to meet this need and her adult son also lives with her and provides informal supports during the evening and at noc.     Health concerns/updates: Client reports she gets very SOB and dizzy with minimal exertion. As stated above she has assistance with all cares and SBA when she ambulates d/t fall risk. Her son helps her get in bed and adjusted so she can breathe better at night.     Cognition/mental health: Client admits she has been struggling with depression. She said she is disgusted with the way her life turned out and wishes she could do it all over again. She said she has been feeling \"blah\" for awhile. Discussed MH services but client declines. PCP was out as well and client started on new antidepressant. Client had medication filled at visit and started taking it.     Client's Plan of Care consists of:  PCA (10 hours a day), Supplies and equipment as needed and Extended supplies of lifeline.      Ania Ibarra RN  [facility] Care Coordinator  [phone]    "

## 2017-03-10 NOTE — PROGRESS NOTES
"Visit to the client's home for annual health risk assessment and PCA assessment on 03/09/17.  An  was not needed.    Current situation/living environment/hospitalization: Client lives in an apartment with her adult son. Home somewhat disheveled and smells heavily of smoke due to client smoking Discussed the risks of client smoking while using oxygen but client is not interested in cessation at this time.     Activities of daily living (ADL)/instrumental activities of daily living (IADL) and functional issues: D/t clients SOB and dizziness she is dependent with all IADL's and ADL's. She has PCA in place to meet this need and her adult son also lives with her and provides informal supports during the evening and at noc.     Health concerns/updates: Client reports she gets very SOB and dizzy with minimal exertion. As stated above she has assistance with all cares and SBA when she ambulates d/t fall risk. Her son helps her get in bed and adjusted so she can breathe better at night.     Cognition/mental health: Client admits she has been struggling with depression. She said she is disgusted with the way her life turned out and wishes she could do it all over again. She said she has been feeling \"blah\" for awhile. Discussed MH services but client declines. PCP was out as well and client started on new antidepressant. Client had medication filled at visit and started taking it.     Client's Plan of Care consists of:  PCA (10 hours a day), Supplies and equipment as needed and Extended supplies of lifeline.      Ania Ibarra RN  [facility] Care Coordinator  [phone]    "

## 2017-03-14 NOTE — PROGRESS NOTES
Preceptor attestation:  Patient seen and discussed with the resident. Assessment and plan reviewed with resident and agreed upon.  Supervising physician: Deniz Bragg  [facility]

## 2017-05-02 NOTE — PROGRESS NOTES
"Subjective: Jhoana Ramirez is a 83 year old who is seen at home for follow up visit today.  Seen at [address] [facility]  [address] [place] [id number] .    Also present at visit include her son, Elvin.  Phone: [phone]    Acute concerns today include: None.    She reports that she is doing well.  She notes that her breathing is \"okay\", but she continues to require 2-4 liters of oxygen at all times.  She gets short of breath just walking from room to room in her apartment.  She uses her duonebs 4-5 times per day, and albuterol 2-3 times per day.  She was instructed to use Spriva daily, but has still not started this because she is worried it will damage her lone remaining kidney.  She continues to smoke 1/2 ppd, and reports that she is not ready to quit.  There have been issues with her O2 concentrator multiple times this year, per patient's son.    Her mood is improved since starting the lexapro.  She denies side effects from the medication.  She is happy with how it is working.    Son reports that the patient has \"good days and bad days\".  He is most concerned about her breathing.  He states she uses her albuterol \"too much.\"    Chronic and Past Medical Problems include:  Patient Active Problem List   Diagnosis     Acute myocardial infarction (H)     Essential hypertension, benign     Coronary atherosclerosis     Depressive disorder, not elsewhere classified     Esophageal reflux     Female stress incontinence     Benign neoplasm of cerebral meninges (H)     Tobacco use disorder     Osteoarthritis     Restless legs syndrome (RLS)     Health Care Home     COPD, moderate (H)     Macrocytic anemia     Family History        Negative family history of: DIABETES, Coronary Artery Disease, Hypertension, Hyperlipidemia, CEREBROVASCULAR DISEASE, Breast Cancer, Colon Cancer, Prostate Cancer, Other Cancer, Depression, Anxiety Disorder, MENTAL ILLNESS, Substance Abuse, Anesthesia Reaction, Asthma, OSTEOPOROSIS, Genetic " Disorder, Thyroid Disease, Obesity, Unknown/Adopted        Social History:   Current activities:  Stays in her apartment  Support services:  PCA services 5 hours/day, son also lives with her  Currently living family/friends:  Lives with son  PMHX/PSHX/MEDS/ALLERGIES/SHX/FHX reviewed and updated in Epic.   MEDICATION LIST:  Current Outpatient Prescriptions   Medication     ipratropium - albuterol 0.5 mg/2.5 mg/3 mL (DUONEB) 0.5-2.5 (3) MG/3ML neb solution     escitalopram (LEXAPRO) 10 MG tablet     amLODIPine (NORVASC) 10 MG tablet     rOPINIRole (REQUIP) 0.5 MG tablet     rOPINIRole (REQUIP) 2 MG tablet     albuterol (ALBUTEROL) 108 (90 BASE) MCG/ACT Inhaler     sulfamethoxazole-trimethoprim (BACTRIM DS/SEPTRA DS) 800-160 MG per tablet     tolterodine (DETROL LA) 2 MG 24 hr capsule     omeprazole (PRILOSEC) 20 MG CR capsule     tiotropium (SPIRIVA HANDIHALER) 18 MCG capsule     melatonin 3 MG tablet     levETIRAcetam (KEPPRA) 500 MG tablet     ferrous sulfate (IRON) 325 (65 FE) MG tablet     Incontinence Supply Disposable (CONFIDENCE FITTED BRIEF REG) MISC     acetaminophen (TYLENOL) 500 MG tablet     No current facility-administered medications for this visit.      Medications are managed by: SELF, FAMILY, HOME NURSE  Patient uses a pill box to manage their medications: Yes   Patient has questions, concerns, or potential side effects/interactions from their medications: Yes (see above)  GERIATRIC ROS:    Do you have difficulty getting around, watching TV or reading because of poor eyesight? No   Can you hear normal conversational voice? Yes   Do you use hearing aides? No   Do you have problems with your memory? Yes   Do you often feel sad or depressed? Yes  Have you unintentionally lost weight in the last 6 months?  No   Do you have trouble with control of your bladder? Yes, wears depends  Do you have trouble with control of your bowels?  No   Have you had any falls in the past year? No    GENERAL ROS:   General:  No unexplained weight gain or loss; adequate sleep pattern.  Resp:See abive  GI: No worsening constipation, no diarrhea, no nausea or vomiting  : no change in incontience   Skin: No areas of new skin breakdown or worrisome rashes  Extremities:  No pain, extremity weakness or balance troubles    Objective: Blood pressure 118/62, pulse 65     Gen: Well nourished and in NAD. Smells heavily of cigarette smoke  HEENT: Head is atraumatic  CV: RRR - no murmurs, rubs, or gallops,   Pulm: CTAB, no wheezes/rales/rhonchi, poor air movement throughout  ABD: soft, nontender, BS intact  Extrem: no cyanosis, edema or clubbing   Psych: Euthymic  Neuro: Pt is able to ambulate independently    Preventative Screening:   Vaccinations reviewed, needs pneumovax    Assessment/ Plan:  (J44.9) Chronic obstructive pulmonary disease, unspecified COPD type (H)  (primary encounter diagnosis)  Comment: Patient stable with her COPD.  She uses 2-4 L O2 daily, and is currently on 3 liters during our visit.  Uses duonebs and albuterol numerous times throughout the day, and still did not start the Spiriva due to concerns about her kidney.   Plan: Stressed the importance of using her Spiriva, as this will likely decrease the need for her albuterol use.  Also, encouraged smoking cessation.  Will need pneumovax at next clinic visit.    (F32.9) Depressed mood  Comment: Mood improved since starting lexapro at last home visit.  Currently on 10 mg daily  Plan: Will keep current dose, increase if worsening of symptoms    (Z86.79) History of hypertension  Comment: Patient has amlodipine on med list.  Reports that she is not taking this. BP normal today.  Plan: Okay to discontiue amlodipine, may need to add back in the future.    RECOMMENDED FOLLOW UP:  2 months.  Patient would like to come to clinic for appointment with Dr. Lopez for next visit.    Level of Service:  10878    Staffed with Dr. Heidy Salcedo MD ([place]) PGY-2  [facility]  Medicine  5/3/2017

## 2017-05-04 NOTE — PROGRESS NOTES
Preceptor attestation:  Patient seen and discussed with the resident. Assessment and plan reviewed with resident and agreed upon.  Supervising physician: Mateus Moody  [facility]

## 2017-06-28 NOTE — PROGRESS NOTES
The medical student acted as a scribe and the encounter documented above was performed completely by me and the documentation accurately reflects the work I have performed today. Sergey Lopez MD

## 2017-06-28 NOTE — MR AVS SNAPSHOT
After Visit Summary   2017    Jhoana Ramirez    MRN: [id number]           Patient Information     Date Of Birth          1934        Visit Information        Provider Department      2017 2:30 PM Sergey Lopez MD [facility]        Today's Diagnoses     Fatigue, unspecified type    -  1    COPD, moderate (H)        Macrocytic anemia        Essential hypertension, benign           Follow-ups after your visit        Follow-up notes from your care team     Return if symptoms worsen or fail to improve.      Who to contact     Please call your clinic at [phone] to:    Ask questions about your health    Make or cancel appointments    Discuss your medicines    Learn about your test results    Speak to your doctor   If you have compliments or concerns about an experience at your clinic, or if you wish to file a complaint, please contact [facility] Patient Relations at [phone] or email us at [email or web address]         Additional Information About Your Visit        MyChart Information     [employer] is an electronic gateway that provides easy, online access to your medical records. With [employer], you can request a clinic appointment, read your test results, renew a prescription or communicate with your care team.     To sign up for [employer] visit the website at [email or web address]   You will be asked to enter the access code listed below, as well as some personal information. Please follow the directions to create your username and password.     Your access code is: [email or web address]  Expires: 10/15/2017  1:09 PM     Your access code will  in 90 days. If you need help or a new code, please contact your [facility] or call [phone] for assistance.        Care EveryWhere ID     This is your Care EveryWhere ID. This could be used by other organizations to access your [facility]  records  [id number]        Your Vitals Were     Pulse Temperature                73 98.1  F (36.7  C) (Oral)           Blood Pressure from Last 3 Encounters:   06/28/17 111/65   01/11/17 105/67   07/18/16 97/59    Weight from Last 3 Encounters:   07/18/16 158 lb 6.4 oz (71.8 kg)   05/07/14 179 lb 14.4 oz (81.6 kg)   12/18/12 198 lb 12.8 oz (90.2 kg)              We Performed the Following     CBC w/ Diff and Plt ([facility])     Comprehensive Metabolic Panel ([place])     Folate  Serum ([facility])     Thyroid [place] ([facility])     Vitamin B12 ([facility])          Today's Medication Changes          These changes are accurate as of: 6/28/17 11:59 PM.  If you have any questions, ask your nurse or doctor.               Start taking these medicines.        Dose/Directions    budesonide-formoterol 80-4.5 MCG/ACT Inhaler   Commonly known as:  SYMBICORT   Used for:  COPD, moderate (H)   Started by:  Sergey Lopez MD        Dose:  2 puff   Inhale 2 puffs into the lungs 2 times daily   Quantity:  1 Inhaler   Refills:  11            Where to get your medicines      These medications were sent to [address] [address]     Phone:  [phone]     budesonide-formoterol 80-4.5 MCG/ACT Inhaler                Primary Care Provider Office Phone # Fax #    Sergey Lopez [phone]       [address] [address]        Equal Access to Services     [facility] : Hadmalik murphy Soneena, waaxda luqadaha, qaybta kaalmada adeegyada, waxay idiin hayaan adeeg kharash la'aan . So [facility] [phone].    ATENCIÓN: Si habla español, tiene a umana disposición servicios gratuitos de asistencia lingüística. Llame al [phone].    We comply with applicable federal civil rights laws and [place] laws. We do not discriminate on the basis of race, color, national origin, age, disability sex, sexual orientation or  gender identity.            Thank you!     Thank you for choosing [facility]  for your care. Our goal is always to provide you with excellent care. Hearing back from our patients is one way we can continue to improve our services. Please take a few minutes to complete the written survey that you may receive in the mail after your visit with us. Thank you!             Your Updated Medication List - Protect others around you: Learn how to safely use, store and throw away your medicines at www.disposemymeds.org.          This list is accurate as of: 6/28/17 11:59 PM.  Always use your most recent med list.                   Brand Name Dispense Instructions for use Diagnosis    acetaminophen 500 MG tablet    TYLENOL    180 tablet    Take 1-2 tablets (500-1,000 mg) by mouth 3 times daily as needed    Osteoarthritis, unspecified osteoarthritis type, unspecified site       albuterol 108 (90 BASE) MCG/ACT Inhaler    albuterol    1 Inhaler    Inhale 1 to 2 puff every 4 to 6 hours as needed    COPD, moderate (H)       aspirin 81 MG EC tablet     90 tablet    Take 1 tablet (81 mg) by mouth daily        budesonide-formoterol 80-4.5 MCG/ACT Inhaler    SYMBICORT    1 Inhaler    Inhale 2 puffs into the lungs 2 times daily    COPD, moderate (H)       CONFIDENCE FITTED BRIEF REG Misc     10 each    Use on a daily basis for incontinence    Urge incontinence of urine       escitalopram 10 MG tablet    LEXAPRO    30 tablet    Take 1 tablet (10 mg) by mouth daily    Episode of recurrent major depressive disorder, unspecified depression episode severity (H)       ferrous sulfate 325 (65 FE) MG tablet    IRON    30 tablet    Take 1 tablet (325 mg) by mouth daily (with breakfast)    Anemia, unspecified anemia type       ipratropium - albuterol 0.5 mg/2.5 mg/3 mL 0.5-2.5 (3) MG/3ML neb solution    DUONEB    360 mL    Take 1 vial (3 mLs) by nebulization every 6 hours as needed    COPD, moderate (H)       levETIRAcetam 500 MG tablet     KEPPRA    60 tablet    Take 1 tablet (500 mg) by mouth 2 times daily    Seizure disorder (H)       melatonin 3 MG tablet     30 tablet    Take 1 tablet (3 mg) by mouth nightly as needed for sleep    Primary insomnia       omeprazole 20 MG CR capsule    priLOSEC    30 capsule    One pill by mouth daily    Gastroesophageal reflux disease without esophagitis       * rOPINIRole 0.5 MG tablet    REQUIP    90 tablet    Take 1 tablet  at bedtime along with 2 mg tablet for a total dose of 2.5mg daily    Restless legs syndrome (RLS)       * rOPINIRole 2 MG tablet    REQUIP    90 tablet    Take 1 tablet  at bedtime along with 0.5 mg tablet for a total dose of 2.5mg daily    Restless legs syndrome (RLS)       sulfamethoxazole-trimethoprim 800-160 MG per tablet    BACTRIM DS/SEPTRA DS    14 tablet    Take 1 tablet by mouth 2 times daily    Polyuria       tiotropium 18 MCG capsule    SPIRIVA HANDIHALER    30 capsule    Inhale contents of one capsule daily.    Panlobular emphysema (H)       tolterodine 2 MG 24 hr capsule    DETROL LA    30 capsule    Take 1 capsule (2 mg) by mouth daily    Urge incontinence of urine       * Notice:  This list has 2 medication(s) that are the same as other medications prescribed for you. Read the directions carefully, and ask your doctor or other care provider to review them with you.

## 2017-06-28 NOTE — PROGRESS NOTES
"       SUBJECTIVE       Jhoana Ramirez is a 83 year old  female with a PMH significant for:     Patient Active Problem List   Diagnosis     Acute myocardial infarction (H)     Essential hypertension, benign     Coronary atherosclerosis     Depressive disorder, not elsewhere classified     Esophageal reflux     Female stress incontinence     Benign neoplasm of cerebral meninges (H)     Tobacco use disorder     Osteoarthritis     Restless legs syndrome (RLS)     Health Care Home     COPD, moderate (H)     Macrocytic anemia     Fatigue: She presents with fatigue. She states that she has been feeling tired and sleeping a lot more over the past 4-5mos. She sleeps ~11hrs at night and still feels tired in the afternoons. She denies feeling hot or cold, constipation. She denies chest pain, palpitations, or blood in her stool. She denies numbness or paresthesias in her fingers or toes. She reports eating 3 meals a day with a varied diet including vegetables.    SOB: Her PCA also notes that she has more SOB than usual when walking just a few steps. She uses her O2 24/7 and usually gets around with her chair. She continues to smoke about 1ppd and is not interested in quitting. She says she has tried many things in the past (patch, gum, etc.) and nothing worked. She denies cough, headache, dizziness. She takes her albuterol inhaler 4x a day and runs out of them often. She also uses her nebulizer 2-3x a day. She does not wake up during the night with SOB or cough. She reports seeing a pulmonologist a long time ago.    Mood: She reports that her mood has been good and she enjoys gambling trips (once every few months) and sleeping.     Chronic pain: She has hip and knee pain but this pain is well-managed with aspirin, which she takes once a day prn.     Heart burn: She reports occasional heart burn for which she takes a \"pink and gray capsule\" as needed. She does not take a daily medication for heart burn.     Tremor: Her PCA " notes that she occasionally has a tremor in her hands bilaterally. She does not know if the tremor is worse at rest or with movement. The tremor does not seem to be associated with any medication usage. She does not take the keppra daily but just prn.     PMH, Medications and Allergies were reviewed and updated as needed.        REVIEW OF SYSTEMS     CONSTITUTIONAL: NEGATIVE for fever, chills, change in weight  RESP: SOB, no cough  CV: NEGATIVE for chest pain, palpitations or peripheral edema  GI: NEGATIVE for nausea, abdominal pain, heartburn, or change in bowel habits  : NEGATIVE for frequency, dysuria, or hematuria. Wears pads for stress incontinence  MUSCULOSKELETAL: knee and hip pain  NEURO: NEGATIVE for weakness, dizziness or paresthesias  ENDOCRINE: NEGATIVE for temperature intolerance, skin/hair changes  HEME/ALLERGY: NEGATIVE for bleeding problems  PSYCHIATRIC: NEGATIVE for changes in mood or affect        OBJECTIVE     Vitals:    06/28/17 1355   BP: 111/65   BP Location: Right arm   Patient Position: Chair   Pulse: 73   Temp: 98.1  F (36.7  C)   TempSrc: Oral     There is no height or weight on file to calculate BMI.    Constitutional: Awake, alert, cooperative, no apparent distress, and appears stated age.  Lungs: Increased work of breathing with diminished breath sounds bilaterally, no crackles or wheezing.  Cardiovascular: Regular rate and rhythm, normal S1 and S2, no S3 or S4, and no murmur noted.  Musculoskeletal: No redness, warmth, or swelling of the joints.  Full range of motion noted.  Motor strength is 5 out of 5 all extremities bilaterally.  Tone is normal.  Neurologic: Awake, alert, oriented to name, place and time. Sensory is intact.   Neuropsychiatric: Normal affect, mood, orientation, memory and insight.    No results found for this or any previous visit (from the past 24 hour(s)).        ASSESSMENT AND PLAN     1. Fatigue, unspecified type  Ddx includes macrocytic anemia, hypothyroidism,  medication side effect (keppra), renal disease, COPD. BUN/Cr 26.7/1.9 on 1/11/17  - Comprehensive Metabolic Panel ([place])  - CBC w/ Diff and Plt ([facility])  - Folate  Serum ([facility])  - [facility]  - Thyroid [place] ([facility])    2. COPD, moderate (H)  - Comprehensive Metabolic Panel ([place])  - budesonide-formoterol (SYMBICORT) 80-4.5 MCG/ACT Inhaler; Inhale 2 puffs into the lungs 2 times daily  Dispense: 1 Inhaler; Refill: 11  - continue to use albuterol inhaler prn for acute episodes of SOB    3. Macrocytic anemia  H/H 10.4/34.0 and  on 1/11/17  - [facility]  - Folate  Serum ([facility])  - Vitamin B12 ([facility])  - Thyroid [place] ([facility])    4. Essential hypertension, benign  Stable, 111/65 at visit today  - Comprehensive Metabolic Panel ([place])    Results of blood work will be shared with home visit team prior to next home visit.     RTC for follow up of fatigue and SOB after blood work complete or sooner if develops new or worsening symptoms.    Sarah Gamez

## 2017-06-28 NOTE — LETTER
June 30, 2017      Jhoana Ramirez  [address]    [place] [facility] [address]        Dear Jhoana,     Your bloodwork looked pretty good.  You are still anemic, but it's stable.   Hope you feel better.     Please see below for your test results.    Resulted Orders   Comprehensive Metabolic Panel ([place])   Result Value Ref Range    Albumin 4.0 3.3 - 4.9 mg/dL    Alkaline Phosphatase 77.8 40.0 - 150.0 U/L    ALT 0.0 0.0 - 45.0 U/L    AST 11.2 0.0 - 45.0 U/L    Bilirubin Total 0.4 0.2 - 1.3 mg/dL    Urea Nitrogen 17.4 7.0 - 19.0 mg/dL    Calcium 8.6 8.5 - 10.1 mg/dL    Chloride 100.7 98.0 - 110.0 mmol/L    Carbon Dioxide 30.4 20.0 - 32.0 mmol/L    Creatinine 1.5 (H) 0.5 - 1.0 mg/dL    Glucose 94.1 70.0 - 99.0 mg'dL    Potassium 4.7 (H) 3.2 - 4.6 mmol/dL    Sodium 138.2 132.0 - 142.0 mmol/L    Protein Total 6.1 (L) 6.8 - 8.8 g/dL    GFR Estimate 35.2 (L) >60.0 mL/min/1.7 m2    GFR Estimate If Black 42.7 (L) >60.0 mL/min/1.7 m2   CBC w/ Diff and Plt ([facility])   Result Value Ref Range    WBC 4.3 4.0 - 11.0 thou/uL    RBC 3.01 (L) 3.80 - 5.40 mill/uL    Hemoglobin 9.8 (L) 12.0 - 16.0 g/dL    Hematocrit 31.7 (L) 35.0 - 47.0 %     (H) 80 - 100 fL    MCH 32.6 27.0 - 34.0 pg    MCHC 30.9 (L) 32.0 - 36.0 g/dL    RDW 12.9 11.0 - 14.5 %    Platelets 125 (L) 140 - 440 thou/uL    Mean Platelet Volume 11.4 8.5 - 12.5 fL    % Neutrophils 63 50 - 70 %    % Lymphocytes 25 20 - 40 %    % Monocytes 9 2 - 10 %    % Eosinophils 3 0 - 6 %    % Basophils 1 0 - 2 %    Neutrophils (Absolute) 2.7 2.0 - 7.7 thou/uL    Lymphs (Absolute) 1.1 0.8 - 4.4 thou/uL    Monocytes(Absolute) 0.4 0.0 - 0.9 thou/uL    Eos (Absolute) 0.1 0.0 - 0.4 thou/uL    Baso (Absolute) 0.0 0.0 - 0.2 thou/uL    Narrative    Test performed by:  [facility] [address] [place] [address]   Folate  Serum ([facility])   Result Value Ref Range    Folate 5.9 >=3.5 ng/mL    Narrative    Test performed by:  [place] [id number] [place] [address]   Vitamin B12 ([facility])   Result Value Ref Range    Vitamin B12 226 213 - 816 pg/mL    Narrative    Test performed by:  [facility] [address] [place] [address]   Thyroid [place] ([facility])   Result Value Ref Range    TSH 1.60 0.30 - 5.00 uIU/mL    Narrative    Test performed by:  [facility] [address] [place] [address]       If you have any questions, please call the clinic to make an appointment.    Sincerely,    Sergey Lopez MD

## 2017-07-24 ENCOUNTER — AMBULATORY - HEALTHEAST (OUTPATIENT)
Dept: GERIATRICS | Facility: CLINIC | Age: 82
End: 2017-07-24

## 2021-05-25 ENCOUNTER — RECORDS - HEALTHEAST (OUTPATIENT)
Dept: ADMINISTRATIVE | Facility: CLINIC | Age: 86
End: 2021-05-25

## 2021-05-26 ENCOUNTER — RECORDS - HEALTHEAST (OUTPATIENT)
Dept: ADMINISTRATIVE | Facility: CLINIC | Age: 86
End: 2021-05-26

## 2021-05-27 ENCOUNTER — RECORDS - HEALTHEAST (OUTPATIENT)
Dept: ADMINISTRATIVE | Facility: CLINIC | Age: 86
End: 2021-05-27

## 2021-05-29 ENCOUNTER — RECORDS - HEALTHEAST (OUTPATIENT)
Dept: ADMINISTRATIVE | Facility: CLINIC | Age: 86
End: 2021-05-29

## 2021-05-31 ENCOUNTER — RECORDS - HEALTHEAST (OUTPATIENT)
Dept: ADMINISTRATIVE | Facility: CLINIC | Age: 86
End: 2021-05-31

## 2021-05-31 VITALS
HEIGHT: 66 IN | BODY MASS INDEX: 23.53 KG/M2 | WEIGHT: 146.4 LBS | BODY MASS INDEX: 23.53 KG/M2 | WEIGHT: 146.4 LBS | HEIGHT: 66 IN

## 2021-06-01 ENCOUNTER — RECORDS - HEALTHEAST (OUTPATIENT)
Dept: ADMINISTRATIVE | Facility: CLINIC | Age: 86
End: 2021-06-01

## 2021-06-02 ENCOUNTER — RECORDS - HEALTHEAST (OUTPATIENT)
Dept: ADMINISTRATIVE | Facility: CLINIC | Age: 86
End: 2021-06-02

## 2021-06-03 ENCOUNTER — RECORDS - HEALTHEAST (OUTPATIENT)
Dept: ADMINISTRATIVE | Facility: CLINIC | Age: 86
End: 2021-06-03

## 2021-06-09 ENCOUNTER — RECORDS - HEALTHEAST (OUTPATIENT)
Dept: ADMINISTRATIVE | Facility: CLINIC | Age: 86
End: 2021-06-09

## 2021-06-11 NOTE — PROGRESS NOTES
Pt seen for Duo nebs x2 HR 73-84 RR 20-24 SO2 92-96% on 2-2.5L NC BS decreased with coarse BS LLL BiPAP is now PRN.  Gayle Segovia

## 2021-06-11 NOTE — ED PROVIDER NOTES
eMERGENCY dEPARTMENT eNCOUnter      CHIEF COMPLAINT    Chief Complaint   Patient presents with     Hip Pain       HPI    Jhoana Ramirez is a 83 y.o. female with history of COPD, hypertension, MI, and stroke who presents to the ED with hip pain. The patient reports falling today while walking to her kitchen, landing on her buttocks. She is unsure why she fell as she does not remember tripping or being dizzy. Patient notes aching pain in her left hip and entire left leg rated at 8/10 in severity. She denies any exacerbating features. Patient denies hitting her head when she fell, and she denies any pain in the neck, chest, or abdomen. She reports somewhat chronic back pain. Patient's son is present, and he states her lower legs have been swollen recently.    This document serves as a record of services performed by Dr. Nico Saucedo, it was created on her behalf by Tyson Villa, a trained medical scribe. The creation of this record is based on the scribe's personal observations and the providers statements to her. This document has been checked and approved by the attending provider.    PAST MEDICAL HISTORY    Past Medical History:   Diagnosis Date     Acute on chronic kidney failure      CAD (coronary artery disease)      Cataract      Cerebral edema      COPD (chronic obstructive pulmonary disease)      Depression      Encephalopathy      Hypertension      Meningioma      Myocardial infarction      Osteoarthritis      Stroke        SURGICAL HISTORY    Past Surgical History:   Procedure Laterality Date     CATARACT EXTRACTION       CHOLECYSTECTOMY       NEPHRECTOMY      Left      PARTIAL KNEE ARTHROPLASTY      Left     PICC  1/29/2016            CURRENT MEDICATIONS      Current Facility-Administered Medications:      magnesium sulfate in water 2 gram/50 mL (4 %) IVPB 2 g, 2 g, Intravenous, Once, Nico Saucedo MD, Last Rate: 100 mL/hr at 07/09/17 1650, 2 g at 07/09/17 1650     nitroglycerin SL tablet 0.4 mg  (NITROSTAT), 0.4 mg, Sublingual, Q5 Min PRN, Nico Saucedo MD, 0.4 mg at 07/09/17 7300     Insert peripheral IV, , , Once **AND** Saline lock IV, , , Once **AND** sodium chloride 0.9 % flush 3 mL (NS), 3 mL, Intravenous, Line Care, Nico Saucedo MD    Current Outpatient Prescriptions:      albuterol (PROVENTIL HFA;VENTOLIN HFA) 90 mcg/actuation inhaler, Inhale 1-2 puffs every 4 (four) hours as needed. Every 4-6 hours as needed., Disp: , Rfl:      amLODIPine (NORVASC) 10 MG tablet, Take 10 mg by mouth daily., Disp: , Rfl:      budesonide-formoterol (SYMBICORT) 80-4.5 mcg/actuation inhaler, Inhale 2 puffs 2 (two) times a day., Disp: , Rfl:      levETIRAcetam (KEPPRA) 500 MG tablet, Take 500 mg by mouth 2 (two) times a day., Disp: , Rfl:      omeprazole (PRILOSEC) 20 MG capsule, Take 20 mg by mouth daily., Disp: , Rfl:      rOPINIRole (REQUIP) 2 MG tablet, Take 2 mg by mouth at bedtime., Disp: , Rfl:     ALLERGIES    Allergies   Allergen Reactions     Corticosteroids (Glucocorticoids)      Cortisone Acetate      Hydroxyzine Hcl        FAMILY HISTORY    No family history on file.    SOCIAL HISTORY    Social History     Social History     Marital status:      Spouse name: N/A     Number of children: N/A     Years of education: N/A     Social History Main Topics     Smoking status: Former Smoker     Types: Cigarettes     Smokeless tobacco: Never Used      Comment: States she quit smoking 3 weeks ago on 7/11/16     Alcohol use None     Drug use: No     Sexual activity: Not Asked     Other Topics Concern     None     Social History Narrative       REVIEW OF SYSTEMS    Review of Systems   Constitutional: Negative for chills, fatigue and fever.   HENT: Negative for congestion, rhinorrhea and sore throat.    Eyes: Negative for visual disturbance.   Respiratory: Negative for cough and shortness of breath.    Cardiovascular: Positive for leg swelling. Negative for chest pain.   Gastrointestinal: Negative for  "abdominal pain, diarrhea, nausea and vomiting.   Genitourinary: Negative for dysuria, flank pain and hematuria.   Musculoskeletal: Positive for arthralgias. Negative for back pain, myalgias and neck pain.   Skin: Negative for wound.   Neurological: Negative for weakness, light-headedness and numbness.   Hematological: Does not bruise/bleed easily.   Psychiatric/Behavioral: Negative for confusion.   All other systems reviewed and are negative.        PHYSICAL EXAM    VITAL SIGNS: /56  Pulse 91  Temp 97.9  F (36.6  C) (Oral)   Resp 24  Ht 5' 6\" (1.676 m)  Wt 156 lb (70.8 kg)  SpO2 (!) 77%  BMI 25.18 kg/m2   General presentation: Alert, Vital signs reviewed. Appears to be in no acute distress.  HEENT: Pharynx is clear. Mucous membranes are moist. The neck is supple, with full ROM. No JVD, no carotid bruits.  Eye: Pupils are reactive to light. EOMI.  Pulmonary: Currently in no acute respiratory distress. Normal, non labored respirations, the lung sounds are normal with good equal air movement.  Circulatory: Regular rate and rhythm. Peripheral pulses are strong and equal. No murmur.  Abdominal: The abdomen is soft. Nontender. No peritoneal signs. Bowel sounds are normal.  Neurologic: Alert, oriented to person, place, and time. No motor deficit. No sensory deficit. Cranial nerves II through XII are intact.  Musculoskeletal: Tenderness over the left hip joint with decreased range of motion secondary to pain. Mild bilateral lower extremity edema.  Skin: Skin color is normal. No rashes. Warm. Dry to touch.    EKG    EKG: normal EKG, normal sinus rhythm, unchanged from previous tracings.  EKG: normal EKG, normal sinus rhythm, unchanged from previous tracings.  I have independently reviewed and interpreted this EKG, pending cardiology read    RADIOLOGY    Please see official radiology report.  Xr Hip Left 2 Or More Vws    Result Date: 7/9/2017  XR HIP LEFT 2 OR MORE VWS 7/9/2017 3:39 PM INDICATION: Hip pain.    " COMPARISON: None. FINDINGS: Minimal sclerosis along the left femur intertrochanteric region and slight lucency along the greater trochanter, though no definitive fracture. If high suspicion, CT could be performed. No dislocation. Degenerative change of the spine, SI joints and hips.     Xr Chest Ap    Result Date: 7/9/2017  XR CHEST AP 7/9/2017 3:30 PM INDICATION: Fall. Chest pain. COMPARISON: None. FINDINGS: Tiny right apical and lateral pneumothorax. Possibility would include rib fracture or sequela of emphysema seen on prior CT as an etiology (though no overt rib fractures noted on radiography). Minimal basilar atelectasis or scarring. Otherwise,  the lungs are clear. Stable cardiomediastinal silhouette. Aortic atherosclerosis. Findings verbally communicated [facility] physician Lewis at 3:44 PM on 7/9/2017.     Ct Hip Without Contrast Left    Result Date: 7/9/2017  CT LEFT HIP 7/9/2017 4:30 PM INDICATION: Fall, pain. TECHNIQUE: Noncontrast. Axial, sagittal and coronal thin-section reconstruction. Dose reduction techniques were used. COMPARISON: Plain films 07/09/2017 FINDINGS: There is an irregular transversely oriented fracture through the left greater trochanter extending to the base of the greater trochanter posteriorly, the proximal fragment is distracted and displaced superiorly, posteriorly, and medially by as much as 1 cm. There is a subtle nondisplaced fracture extending from the posterior aspect of the greater trochanteric fracture into the intertrochanteric region along the posterior cortex, with extension to the base of the lesser trochanter through the anterior cortex, best seen on series 2 image 58 and 59, and series 5 image 42 and 43. There is soft tissue stranding and hemorrhage adjacent to the fracture. No additional fracture. Mild bony demineralization. Moderate degenerative change left hip joint and mild chondrocalcinosis. Degenerative change of the pubic symphysis. Degenerative change  left SI joint and visualized lumbar spine.     CONCLUSION: 1.  Displaced fracture through the left greater trochanter. There is involvement of the intertrochanteric region with a nondisplaced component to the fracture extending into the intertrochanteric region to the base of the lesser trochanter.     I have independently reviewed and interpreted the above imaging, pending the final radiology read    LABS  Results for orders placed or performed during the hospital encounter of 07/09/17   Urinalysis-UC if Indicated   Result Value Ref Range    Color, UA Yellow Colorless, Yellow, Straw, Light Yellow    Clarity, UA Cloudy (!) Clear    Glucose, UA Negative Negative    Bilirubin, UA Negative Negative    Ketones, UA Negative Negative    Specific Gravity, UA 1.012 1.001 - 1.030    Blood, UA Negative Negative    pH, UA 5.5 4.5 - 8.0    Protein, UA 30 mg/dL (!) Negative mg/dL    Urobilinogen, UA <2.0 E.U./dL <2.0 E.U./dL, 2.0 E.U./dL    Nitrite, UA Negative Negative    Leukocytes, UA Small (!) Negative    Bacteria, UA Many (!) None Seen hpf    RBC, UA 0-2 None Seen, 0-2 hpf    WBC, UA 5-10 (!) None Seen, 0-5 hpf    Squam Epithel, UA 25-50 (!) None Seen, 0-5 lpf    Mucus, UA Few (!) None Seen lpf   Comprehensive metabolic panel   Result Value Ref Range    Sodium 142 136 - 145 mmol/L    Potassium 5.3 (H) 3.5 - 5.0 mmol/L    Chloride 107 98 - 107 mmol/L    CO2 27 22 - 31 mmol/L    Anion Gap, Calculation 8 5 - 18 mmol/L    Glucose 109 70 - 125 mg/dL    BUN 19 8 - 28 mg/dL    Creatinine 1.66 (H) 0.60 - 1.10 mg/dL    GFR MDRD Af Amer 36 (L) >60 mL/min/1.73m2    GFR MDRD Non Af Amer 30 (L) >60 mL/min/1.73m2    Bilirubin, Total 0.4 0.0 - 1.0 mg/dL    Calcium 8.4 (L) 8.5 - 10.5 mg/dL    Protein, Total 5.8 (L) 6.0 - 8.0 g/dL    Albumin 3.1 (L) 3.5 - 5.0 g/dL    Alkaline Phosphatase 77 45 - 120 U/L    AST 12 0 - 40 U/L    ALT <6 0 - 45 U/L   Magnesium   Result Value Ref Range    Magnesium 1.3 (L) 1.8 - 2.6 mg/dL   Protime-INR   Result  Value Ref Range    INR 1.01 0.90 - 1.10   APTT   Result Value Ref Range    PTT 39 (H) 24 - 37 seconds   Troponin I   Result Value Ref Range    Troponin I 0.01 0.00 - 0.29 ng/mL   CK MB   Result Value Ref Range    CK-MB 2 0 - 7 ng/mL   HM1 (CBC with Diff)   Result Value Ref Range    WBC 5.9 4.0 - 11.0 thou/uL    RBC 2.73 (L) 3.80 - 5.40 mill/uL    Hemoglobin 9.1 (L) 12.0 - 16.0 g/dL    Hematocrit 28.9 (L) 35.0 - 47.0 %     (H) 80 - 100 fL    MCH 33.3 27.0 - 34.0 pg    MCHC 31.5 (L) 32.0 - 36.0 g/dL    RDW 13.6 11.0 - 14.5 %    Platelets 132 (L) 140 - 440 thou/uL    MPV 10.2 8.5 - 12.5 fL    Neutrophils % 85 (H) 50 - 70 %    Lymphocytes % 8 (L) 20 - 40 %    Monocytes % 7 2 - 10 %    Eosinophils % 1 0 - 6 %    Basophils % 0 0 - 2 %    Neutrophils Absolute 5.0 2.0 - 7.7 thou/uL    Lymphocytes Absolute 0.5 (L) 0.8 - 4.4 thou/uL    Monocytes Absolute 0.4 0.0 - 0.9 thou/uL    Eosinophils Absolute 0.0 0.0 - 0.4 thou/uL    Basophils Absolute 0.0 0.0 - 0.2 thou/uL   B-type natriuretic peptide   Result Value Ref Range     0 - 167 pg/mL   ECG 12 lead nursing unit performed   Result Value Ref Range    SYSTOLIC BLOOD PRESSURE  mmHg    DIASTOLIC BLOOD PRESSURE  mmHg    VENTRICULAR RATE 93 BPM    ATRIAL RATE 93 BPM    P-R INTERVAL 148 ms    QRS DURATION 70 ms    Q-T INTERVAL 366 ms    QTC CALCULATION (BEZET) 455 ms    P Axis 70 degrees    R AXIS 84 degrees    T AXIS 81 degrees    MUSE DIAGNOSIS       Sinus rhythm  Normal ECG  When compared with ECG of 29-JAN-2016 10:43,  No significant change was found  Confirmed by CESARIO NICOLAS MD LOC:WW ([id number]) on 7/9/2017 4:08:03 PM     ECG 12 lead nursing unit performed   Result Value Ref Range    SYSTOLIC BLOOD PRESSURE  mmHg    DIASTOLIC BLOOD PRESSURE  mmHg    VENTRICULAR RATE 91 BPM    ATRIAL RATE 91 BPM    P-R INTERVAL 168 ms    QRS DURATION 84 ms    Q-T INTERVAL 372 ms    QTC CALCULATION (BEZET) 457 ms    P Axis 88 degrees    R AXIS 69 degrees    T AXIS 54 degrees    MUSE  DIAGNOSIS       Sinus rhythm  Normal ECG  When compared with ECG of 09-JUL-2017 15:05,  No significant change was found  Confirmed by CESARIO NICOLAS MD LOC:WW ([id number]) on 7/9/2017 4:17:11 PM         ED COURSE & MEDICAL DECISION MAKING    Medications   sodium chloride 0.9 % flush 3 mL (NS) (not administered)   nitroglycerin SL tablet 0.4 mg (NITROSTAT) (0.4 mg Sublingual Given 7/9/17 1653)   magnesium sulfate in water 2 gram/50 mL (4 %) IVPB 2 g (2 g Intravenous New Bag 7/9/17 1650)   morphine injection 4 mg (4 mg Intravenous Given 7/9/17 1512)   ondansetron injection 4 mg (ZOFRAN) (4 mg Intravenous Given 7/9/17 1511)   ipratropium-albuterol 0.5-2.5 mg/3 mL nebulizer solution 3 mL (DUO-NEB) (3 mL Nebulization Given 7/9/17 1557)   aspirin chewable tablet 162 mg (162 mg Oral Given 7/9/17 1650)     I had a discussion with the patient and the family regarding the symptoms, exam, laboratory, X Ray, CT Scan results, diagnosis, and plan.  UA is consistent with improperly collected specimen and antibiotics are not started at this time.   Magnesium is very low and this replaced.  The patient began to complain of chets pain at 1550 and a repeat EKG was ordered that did not show any acute findings.   CXR show small right apical PTX.   Left Hip X Ray shows a displaced hip fracture through the left greater trochanter.   The patient was discussed with Dr Morin of [facility] who would like for the patient to be admitted to the hospitalist service with Ortho Consult.   The patient is discussed with and very kindly accepted by Dr Greenfield for admission.    I answered all questions to the best of my ability.   The patient voiced understanding of the plan and was agreeable.    FINAL IMPRESSION    1. Fall, initial encounter    2. Pneumothorax on right    3. Generalized weakness    4. Chest pain, unspecified type    5. Hip fracture, left, closed, initial encounter          New Prescriptions    No medications on file         IDr.  Nico Saucedo, personally performed the services described in this documentation, as scribed by Tyson Villa in my presence, and it is both accurate and complete.                  Nico Saucedo MD  07/09/17 [id number]

## 2021-06-11 NOTE — PROGRESS NOTES
Speech Language/Pathology  Speech Therapy Daily Progress Note    Patient presents as alert and cooperative during this session.  An  was not applicable.    Objective  Swallow: Reassessed swallow with puree, cracker, honey, nectar and thin liquids. Patient had no s/s aspiration with 4 ounces puree, one cracker, 4 ounces nectar thick and 3 sips of honey thick liquids. Patient had intermittent cough with thin liquids, x3 total with 6 ounces. Mastication was slow and mildly disorganized but ultimately WFL for cracker.     Assessment  Initiate diet of NDD3 textures with Nectar thick liquids. Monitor closely for s/s aspiration and change to NPO if any occur. ST to trial upgrades.     Plan/Recommendations      current goals remain appropriate    20 dysphagia minutes     Jesi Tobin MA, CCC-SLP

## 2021-06-11 NOTE — PROGRESS NOTES
Speech Language/Pathology  Bedside Swallow Evaluation    Problem:  Patient Active Problem List   Diagnosis     Meningioma     Nicotine Dependence - Continuous     Hyperlipidemia     Benign essential hypertension     Coronary Artery Disease     Moderate COPD (chronic obstructive pulmonary disease)     Esophageal reflux     Osteoarthritis     Vertigo     Dependence on supplemental oxygen     Hip fracture     Macrocytic anemia     Acute postoperative respiratory failure     Respiratory failure, post-operative     Acute myocardial infarction     Depression     Stress incontinence in female     Acute on chronic kidney failure     Pneumonia of right lower lobe due to infectious organism     UTI (urinary tract infection)     Single kidney     Meningioma     Hip fracture, left, closed, with routine healing, subsequent encounter       Onset date: 7/9/2017  Reason for evaluation: Extubated yesterday. Pt requires a bedside swallow evaluation prior to order for PO. Pt had post op acute hypoxic respiratory failure.   Pertinent History: CAD, COPD (2-4 L @ home), MI, CVA  Current Diet: NPO  Baseline Diet: Regular with thin suspected.     Patient presents as lethargic during this session.   An  was not applicable    Patient was given honey and thin.    Dentition/Oral hygiene: edentulous. Dentures are present in the room, but not placed as no textures were trialed. Pt reports that she does not wear denture adhesive and that her dentures fit well.   Oral motor function was weak.  Voce is hoarse and overall there is decreased speed and precision with speech along with decreased volume. SLP does require pt to repeat herself at times.     Bolus prep and oral control was mildly impaired. Some holding of the bolus is noted, but no oral loss or stasis is noted.  Pt is lethargic so she di not feed herself when the cup was placed in her hand.     Anterior-Posterior transit was mildly impaired.    Oral stasis did not occur with all  textures trialed.    Pharyngeal Phase:    Honey: Patient trialed 2 teaspoon by spoon and presented with multiple swallows. Pt given 1 sip from the cup and presented with multiple swallows and delayed strong coughing that was wet and congested in nature. Wetness sounded like it eminated from the throat. Some holding of bolus is noted so unable to determine whether pt presented with a pharyngeal delay or if it was more of an oral delay.  Hyolaryngeal movement appeared reduced.    Thin:Patient trialed 3 teaspoon by spoon and presented with multiple swallows, pt demonstrated delayed strong coughing with 1/2 sips of this via cup and straw. Multiple swallows were noted with straw and cup as well.   Sips from the straw that had delayed cough sounded wet and congested. Initiation of swallow did not overtly appear delayed. Some oral holding is noted so unable to determine whether pt presents with a pharyngeal delay or if it was more of just an oral delay.  Hyolaryngeal movement appeared reduced.    Assessment:    Patient presents with signs and symptoms of aspiration with honey thick and thin (there were the only consistencies trialed). Strong delayed coughing was noted with thin and honey.     Pt presents with more suspected impairment with pharyngeal swallow than oral.  Pt was lethargic during assessment, but did participate.  If pt continues to demonstrate s/s aspiration with consistencies tomorrow speech therapy would recommend VFSS to further assess swallow. Handoff given to RN who was in the room.     Rehab potential is good based on prior level of function.    Recommendations:    Plan: NPO with speech therapy to reassess swallow tomorrow.     Speech therapy 5-7 times per week    25 dysphagia minutes       Ana Laura Coppola MS, CCC-SLP

## 2021-06-11 NOTE — PROGRESS NOTES
"[facility]NAL ([facility]) progress note  CC: F/U FRANK  S: Since last visit, no acute events. Remains intubated and sedated. She is slightly tachycardic but not hypotensive to the point of requiring pressor support. Urine output >3 L yesterday. Less overnight, down to 200 cc/shift. She does open eyes to stimuli but will not follow commands at this point.     A/P:   1. FRANK: Likely heomdynamic shifts intraoperatively or shortly after. She has significant risk factors for ATN, a unilateral kidney, vascular disease with smoking history and underlying advanced CKD. Creatinine trending up to 2.7 mg/dL today. Watch UOP closely but I do not recommend diuretics at this point, she appears roughly euvolemic. Attempt to maintain MAPs >65 mmHg, avoid nephrotoxins.  2. CKD stage 4: Baseline creatinine of 2 mg/dL, unilateral kidney. Was removed decades ago, reasons unclear.  3. Hypotension: Secondary to sedation vs acute illness. No pressor requirements. Judicious fluid management due to potential renal failure, hypervolemia.  4. Anemia: Secondary to CKD, trend. Venofer course due to iron deficiency.   5. Hyperkalema: Normalized after high dose diuretics, hold for now and trend.  6. Respiratory failure: Significant underlying COPD due to tobacco use. On chronic oxygen at home. Expect prolonged ventilator dependence but ICU to start weaning.     Truong Shen MD  [facility]  Office: [phone]  Pager: [phone]      BP 97/60  Pulse (!) 112  Temp 99  F (37.2  C) (Oral)   Resp 18  Ht 5' 6\" (1.676 m)  Wt 153 lb 9.6 oz (69.7 kg)  SpO2 100%  BMI 24.79 kg/m2    I/O last 3 completed shifts:  In: 2631.6 [I.V.:1288.1; NG/GT:1039; IV Piggyback:304.5]  Out: 2550 [Urine:2550]    Physical Exam:   Gen: NAD, chronically ill appearing  HEENT: No icterus, NC/AT, trachea intubated  CARDIOVASCULAR: RR, no rub,  Trace leg edema  PULMONARY: CTA ant No cyanosis, poor air movement  GASTROINTESTINAL: soft, NT/ND  MSK: " diffuse severe muscle atrophy, warm  NEURO: unresponsive, does not follow commands  PSYCHIATRIC: unable to eval due to encephalopathy  SKIN: Pale, no jaundice, no rash.    Results from last 7 days  Lab Units 07/13/17  0429 07/12/17  0454 07/11/17 2016 07/09/17  1440   LN-SODIUM mmol/L 144 144 142  < > 142   LN-POTASSIUM mmol/L 4.0 4.7 4.8  < > 5.3*   LN-CHLORIDE mmol/L 108* 110* 111*  < > 107   LN-CO2 mmol/L 26 22 20*  < > 27   LN-BLOOD UREA NITROGEN mg/dL 56* 45* 42*  < > 19   LN-CREATININE mg/dL 2.77* 2.35* 2.21*  < > 1.66*   LN-CALCIUM mg/dL 8.0* 8.2* 8.1*  < > 8.4*   LN-ALBUMIN g/dL 2.5* 2.6*  --   --  3.1*   LN-PROTEIN TOTAL g/dL 4.7* 4.8*  --   --  5.8*   LN-BILIRUBIN TOTAL mg/dL 0.4 0.5  --   --  0.4   LN-ALKALINE PHOSPHATASE U/L 49 53  --   --  77   LN-ALT (SGPT) U/L <6 <6  --   --  <6   LN-AST (SGOT) U/L 10 10  --   --  12   < > = values in this interval not displayed.    Results from last 7 days  Lab Units 07/13/17  0429 07/13/17  0006 07/12/17 0454 07/11/17  1052   LN-WHITE BLOOD CELL COUNT thou/uL 7.5  --  3.8* 3.3*   LN-HEMOGLOBIN g/dL 9.0* 9.5* 9.3* 7.3*   LN-HEMATOCRIT % 25.8*  --  26.9* 22.9*   LN-PLATELET COUNT thou/uL 111*  --  113* 108*         Scheduled Meds:    acetaminophen  1,000 mg Enteral Tube TID    Or     acetaminophen  650 mg Rectal TID     budesonide  0.5 mg Nebulization BID - RT     chlorhexidine  15 mL Topical Q12H     formoterol fumarate  20 mcg Nebulization Q12H     [START ON 7/14/2017] furosemide  40 mg Intravenous DAILY     gabapentin  100 mg Enteral Tube QHS     heparin (PF)  5,000 Units Subcutaneous Q12H 09-21     insulin aspart (NovoLOG) injection   Subcutaneous Q6H     ipratropium-albuterol  3 mL Nebulization Q4H - RT     iron sucrose (VENOFER) IVPB  100 mg Intravenous Q24H     levETIRAcetam  (KEPPRA) solution 100 mg/mL  500 mg Enteral Tube BID    Or     levETIRAcetam (KEPPRA) IVPB  500 mg Intravenous BID     methylPREDNISolone sodium succinate  60 mg Intravenous Q6H      omeprazole  20 mg Enteral Tube QHS    Or     omeprazole  20 mg Oral QHS    Or     pantoprazole  40 mg Intravenous QHS     piperacillin-tazobactam (ZOSYN) IV  3.375 g Intravenous Q12H     polyethylene glycol  17 g Oral DAILY     rOPINIRole  2 mg Enteral Tube QHS     senna-docusate  1 tablet Oral BID    Or     senna (SENOKOT) syrup  8.8 mg Enteral Tube BID     sodium chloride  10-30 mL Intravenous Q8H FIXED TIMES     sodium chloride  3 mL Intravenous Line Care     sodium polystyrene sulfonate  30 g Enteral Tube Once     vecuronium (NORCURON) syringe 1 mg/1 mL  0.1 mg/kg Intravenous Once     viscous lidocaine HC  15 mL Oral Once     white petrolatum-mineral oil   Both Eyes Q8H     Continuous Infusions:    dexmedetomidine 400 mcg/100 mL in NS (PRECEDEX) (4mcg/mL) Stopped (07/11/17 2120)     fentaNYL 1500 mcg/150 mL in NS (10 mcg/ml) 120 mcg/hr (07/13/17 0442)     insulin regular infusion 0.5 unit/mL       midazolam (VERSED) infusion 4 mg/hr (07/13/17 0850)     nacl 0.9% 25 mL/hr (07/12/17 0928)     PRN Meds:.acetaminophen, albuterol, aluminum-magnesium hydroxide-simethicone, bacitracin, benzocaine-menthol, bisacodyl, dextrose 50 % (D50W), fentaNYL 1500 mcg/150 mL in NS (10 mcg/ml), fentaNYL pf, glucagon (human recombinant), HYDROmorphone, insulin regular infusion 0.5 unit/mL, lidocaine, lipase-protease-amylase **AND** sodium bicarbonate, loratadine, LORazepam, metoclopramide **OR** metoclopramide **OR** metoclopramide, naloxone **OR** naloxone, ondansetron **OR** ondansetron, oxyCODONE, polyvinyl alcohol, sodium chloride, sodium chloride, sodium chloride, sodium phosphates 133 mL      Labs personally reviewed today during this evaluation at 9:22 AM

## 2021-06-11 NOTE — PROGRESS NOTES
[facility] Daily Progress Note    Assessment & Plan: Jhoana Ramirez is a 83 y.o. female with a past medical history of tobacco dependence, COPD, hypertension, and macrocytic anemia who was admitted secondary to a fall and found to have a left hip fracture.    Principal Problem:    Hip fracture  Active Problems:    Benign essential hypertension    Moderate COPD (chronic obstructive pulmonary disease)    Macrocytic anemia    Acute postoperative respiratory failure    Respiratory failure, post-operative    Acute on chronic kidney failure    Pneumonia of right lower lobe due to infectious organism    UTI (urinary tract infection)    Single kidney    Meningioma    Hip fracture, left, closed, with routine healing, subsequent encounter    Acute post-op respiratory failure: In the setting of severe COPD and oversedation.  Resolved at this time.  Patient was extubated on 7/16 and is now back on nasal cannula for oxygen supplementation, which the patient had been using at home.  -Do not continue to wean the oxygen, as patient is on 6 L home O2 at baseline.  -Patient can restart her home Symbicort 2 puffs twice daily and albuterol inhaler as needed today.    -Now transitioned to PO Prednisone 40 mg daily (this is day 2), and will need to taper off given duration of treatment.  We will give 1 more dose of oral prednisone 40 mg tomorrow and then can stop treatment.  -Repleting magnesium and potassium as needed; wnl on 7/19.    Left trochanteric hip fracture, in setting of unwitnessed fall:        -IM nailing procedure on 7/11.  Tylenol and Fentanyl for pain.  Also getting gabapentin.  Will write for Flexeril, as she is complaining of muscle spasms today in that left leg.  -There could possibly be a component of this new-onset peripheral edema causing unsteady gate.  -Up and working with PT and OT, doing well, moving slowly but is able to ambulate with a walker.  -Aspirin 325 mg twice daily, per orthopedic  surgery recommendations.  Currently receiving aspirin 81 mg daily.  -Will need transition to TCU at time of discharge for continuing rehab services.  -Nithya-Colace for constipation, patient has still not had a bowel movement, but is passing gas.  -Patient is stable from a medical standpoint to transfer to TCU at this time.  We will await information about placement.    Hypertension: On Norvasc 10 mg daily at home.    -Has been normotensive since transfer to [facility].  Although was hypertensive the last few days in the ICU.  -Can consider restarting home Norvasc at 5 mg per day, per nephrology recommendations, if pressures become elevated.    Macrocytic Anemia: appears to be chronic in nature, as her most recent Hemoglobin readings here in the hospital over the last year range from the 8s to low 10s.  There could have initially been a component of acute blood loss with hip fracture, but at this point appears to be mostly related to her chronic anemia.  - peripheral smear showing a macrocytosis and thrombocytopenia.  - If patient is symptomatic or Hgb drops below 7.0, can transfuse 1 unit pRBCs.  - Hemoglobin up to 10.0 on 7/17.  - Nephrology following and thinking related to CKD.  Will initiate oral iron treatment, as the patient was on Venofer in the ICU.    Meningioma: Transition IV Keppra to oral Keppra.  - Repeat CT head showing meningioma and mass effect, likely unchanged from MRI.  - Neurosurgery consulted; patient hospitalized for this meningioma in 2016 and family decided on non-operative approach.  - patient now DNR/DNI after discussion with sons.     Diet: Advance to clear liquids today, if tolerating well tomorrow no need for further dysphagia diet.  Fluids: None.  VTE Prophylaxis: Heparin SQ injections.    Discharge Planning discussed with [facility] Medicine Service.  Barriers to discharge:  Medical -pending transfer to TCU.  Anticipated discharge day: TBD.  Disposition: Transitional  care.  Status: inpatient admit  Length of Stay: 10     Patient discussed with attending [facility] Medicine physician, Dr. Swapnil Armenta, who agrees with the plan.     Manuel Levy PGY1 7/19/2017  [facility]   Pager: [phone] (from 8AM-5:30PM)    This is a [facility] Medicine Patient.  Please call the senior pager with any questions or concerns, 24/7.  [phone]    Subjective/Interval events:  Patient feels well, is not having pain anywhere other than some cramps in her left leg near where her incision sites are.  She reports that her breathing is at her baseline, although she has not restarted her home inhalers.  She denies any chest pain or abdominal pain.  She is aware of the need to work with therapy to continue to increase her strength.  She is open to going to a TCU facility at some point, but feels like she needs to get stronger first.  Talks at length about her son, who was been very helpful at home and caring for her.  She does remember the incident where she fell and broke her hip.  She is advancing to clear liquids today and reports no difficulties with swallowing.    Objective  Vital signs in last 24 hours  Temp:  [97.4  F (36.3  C)-98.6  F (37  C)] 97.6  F (36.4  C)  Heart Rate:  [66-80] 80  Resp:  [18-20] 18  BP: ()/(53-74) 93/59  146 lb 6.4 oz (66.4 kg)     Physical Exam  General: Alert, interactive, cracking jokes.  Working with occupational therapy to get up from her chair and back to bed.  Very slow with movements but is able to bear weight with the help of a walker.  Respiratory: Lung sounds are diminished throughout, with end expiratory wheezes bilaterally in the posterior thorax.  Cardiovascular: Heart is regular rate and rhythm, there are no clicks, murmurs, or rubs noted.  Abdomen: Non--tender, non--distended.  Bowel sounds are present.  Belly is soft.  Extremities: No lower extremity edema noted bilaterally.  2 vertical incisions from prior knee  surgeries are noted.    Intake/Output last 3 shifts  I/O last 3 completed shifts:  In: 30 [I.V.:30]  Out: -      Pertinent Labs     Results from last 7 days  Lab Units 07/17/17  0422 07/16/17  0540 07/15/17  0514   LN-WHITE BLOOD CELL COUNT thou/uL 9.2 7.6 5.6   LN-HEMOGLOBIN g/dL 10.0* 9.2* 9.0*   LN-HEMATOCRIT % 29.7* 27.9* 27.3*   LN-PLATELET COUNT thou/uL 121* 103* 101*       Results from last 7 days  Lab Units 07/19/17  0925 07/18/17  0618 07/17/17  0422 07/16/17  0540 07/15/17  0514   LN-SODIUM mmol/L  --  142 144  144 149* 146*   LN-POTASSIUM mmol/L 3.8 3.6 3.9  3.9 3.8 3.8   LN-CHLORIDE mmol/L  --  94* 97*  97* 107 107   LN-CO2 mmol/L  --  34* 34*  34* 29 28   LN-BLOOD UREA NITROGEN mg/dL  --  98* 96*  96* 89* 90*   LN-CREATININE mg/dL  --  2.01* 2.18*  2.18* 2.32* 2.94*   LN-CALCIUM mg/dL  --  8.8 9.0  9.0 8.7 8.4*   LN-ALBUMIN g/dL  --   --  3.8  3.8 3.9 3.5   LN-PROTEIN TOTAL g/dL  --   --  5.7* 5.7* 5.4*   LN-BILIRUBIN TOTAL mg/dL  --   --  1.1* 0.8 0.6   LN-ALKALINE PHOSPHATASE U/L  --   --  46 49 52   LN-ALT (SGPT) U/L  --   --  7 <6 <6   LN-AST (SGOT) U/L  --   --  16 16 14       Results from last 7 days  Lab Units 07/17/17  0422 07/16/17  0540 07/15/17  0514   LN-INR  1.14* 1.14* 1.13*      -repeat Urinalysis on 7/13 (-) for nitrites and leukocyte esterase.  Previous urine culture from admission day had grown both E. Coli and Klebsiella.  -Magnesium wnl on 7/19.  -Sputum Gram stain/culture grew Aspergillus species.    Pertinent Radiology    CT Head on 7/12--no ischemia or hemorrhage.  Vasogenic edema and meningioma in left frontal lobe unchanged in size from MRI in January 2016.    Current Medications.     acetaminophen  1,000 mg Oral TID     aspirin  81 mg Oral DAILY     gabapentin  100 mg Oral QHS     heparin (PF)  5,000 Units Subcutaneous Q8H FIXED TIMES     levETIRAcetam  500 mg Oral BID     polyethylene glycol  17 g Oral DAILY     predniSONE  40 mg Oral Daily with brkfst      senna-docusate  1 tablet Oral BID     sodium chloride  10-30 mL Intravenous Q8H FIXED TIMES     sodium chloride  3 mL Intravenous Line Care     white petrolatum-mineral oil   Both Eyes Q8H     PRN:acetaminophen, albuterol, aluminum-magnesium hydroxide-simethicone, bacitracin, benzocaine-menthol, bisacodyl, HYDROmorphone, ipratropium-albuterol **AND** [] Nebulizer treatment intermittent, labetalol, lidocaine, loratadine, LORazepam, metoclopramide **OR** [DISCONTINUED] metoclopramide **OR** [DISCONTINUED] metoclopramide, naloxone **OR** naloxone, ondansetron **OR** ondansetron, oxyCODONE, polyvinyl alcohol, sodium chloride, sodium chloride, sodium chloride    This note was created with help of Dragon dictation system. Grammatical /typing errors are not intentional.          2017  [facility] Faculty Attestation  I have seen and examined the patient.  I have discussed the case with the resident physician(s), Dr. Levy  I agree with the findings, assessment and plan.    Swapnil Armenta MD

## 2021-06-11 NOTE — PROGRESS NOTES
"Blood pressure 119/58, pulse 73, temperature 97.1  F (36.2  C), temperature source Axillary, resp. rate 18, height 5' 6\" (1.676 m), weight 152 lb 9.6 oz (69.2 kg), SpO2 100 %, not currently breastfeeding.    Vent Mode: VCV  FiO2 (%):  [35 %-100 %] 45 %  S RR:  [16-18] 18  S VT:  [450 mL] 450 mL  PEEP/CPAP (cm H2O):  [5 cm H2O] 5 cm H2O  Minute Ventilation (L/min):  [4.7 L/min-11.1 L/min] 8.5 L/min  PIP:  [11 cm H2O-37 cm H2O] 24 cm H2O  WA SUP:  [10 cm H20] 10 cm H20  MAP (cm H2O):  [5-13] 9    Patient remains on full vent  Support. ETT 7.0 and secured 21 at the teeth.  RT following and will wean as tolerated.   Aguila Ritter, LRT    "

## 2021-06-11 NOTE — PROGRESS NOTES
Speech Language/Pathology  Speech Therapy Daily Progress Note    Patient presents as alert and cooperative during this session.  An  was not applicable.    Objective  Pt analyzed during breakfast. She is positioned at a 45 degree angle tilted to her (L) side as she is self feeding with her (R) hand.  Positioning is not optimum for preventing aspiration, but it is the only comfortable position for patient. No issues noted with NDD3 textures and it was noted she had thin water and soda liquids next to her tray(she is supposed to have nectar thick). She said it was from last night and that she was just saving them for breakfast. Per her dietary printout it was noted that she did not order any liquids (nectar). When asked why she stated she did not like the taste of that thick coffee.  She was analyzed eating her breakfast and drinking thin liquids with no overt s/s aspiration. She was given a cracker and no oral dysphagia noted. Typical mild weakness noted with upper and lower dentures, but functional.   Nursing notified re: pt having thin liquids despite nectar thick order. She was also notified that pt will be upgraded to regular with thin liquids.    Assessment  Dysphagia r/t recent intubation seems to have resolved.    Plan/Recommendations  Follow up re: thin liquid tolerance tomorrow to make sure no decline in respiratory status or overt s/s aspiration with 4 oz. If no issues noted, then dc.    new/updated goals    30 dysphagia minutes     Colleen Hogan MS, CCC-SLP

## 2021-06-11 NOTE — PROGRESS NOTES
Respiratory Therapy Note:  VCV 18 450 30% +5.  7 ETT @ 20 at the teeth.  BS coarse and decreased.  Suctioning a small amount of white/tan secretions.  SBT today for 4 hrs.  RR 26-32, Vt ~ 450, RSBI 61.  Plan is to re-attempt SBT this afternoon.  Nicci Abernathy, LRT, 7/15/2017

## 2021-06-11 NOTE — PROGRESS NOTES
Ortho Progress Note      Post-operative Day: 2 Days Post-Op  S/P INTERNAL FIXATION, LEFT FRACTURE, HIP, WITH TFN PINS   Subjective:    Pt remains ventilated, sedated. Did not react to touch or voice.     Objective:  Vitals:    07/12/17 1400   BP: 110/53   Pulse: 76   Resp: 18   Temp:    SpO2: 100%     Gen: Ventilated, sedated.   Wound status: Left thigh incisions x 3 covered with dry gauze dressings, no drainage.   Circulation, motion and sensation: Toes are well perfused, cool to the touch.   Swelling: Mild   Calf tenderness: calves are bilaterally    Pertinent Labs   Lab Results: personally reviewed.   Lab Results   Component Value Date    INR 1.17 (H) 07/12/2017    INR 1.24 (H) 07/11/2017    INR 1.01 07/09/2017     Lab Results   Component Value Date    WBC 3.8 (L) 07/12/2017    HGB 9.3 (L) 07/12/2017    HCT 26.9 (L) 07/12/2017    MCV 93 07/12/2017     (L) 07/12/2017     Lab Results   Component Value Date     07/12/2017    K 4.7 07/12/2017     (H) 07/12/2017    CO2 22 07/12/2017       Assessment: s/p INTERNAL FIXATION, LEFT FRACTURE, HIP, WITH TFN PINS     Plan:   PT/OT when appropriate.  Weightbearing status:WBAT  Anticoagulation: Currently on SQ heparin, transition to  BID only when extubated  in addition to SCDs, fred stockings and early ambulation.  Dressing changes to left hip daily; dry gauze dressings and paper tape ok.   Discharge planning: Will need TCU cares on discharge.     Report completed by:  Pancho Mckay PA-C  Date: 7/12/2017  Time: 2:20 PM

## 2021-06-11 NOTE — PROGRESS NOTES
"Pulmonology ICU Daily Progress Note    Assessment & Plan: Jhoana Ramirez is a 83 y.o. female with a past medical history of meningioma, CAD, CKD3 with single kidney, COPD on home O2, HTN, and anemia admitted tjo ICU fr acute hypoxic respiratory failure post-surgery. She had a L trochanteric fracture that was pinned 7/10/2017.     1. Acute postoperative respiratory failure - repeat wean today; goal to extubate;   2. Aspiration pneumonia - Day 2 Zosyn; solumedrol 60 mg q6h; SSI;   3. Urinary tract infection - Day 2 Zosyn  4. Pleural effusions  - clinically \"wet\"; kidney cannot tolerate Lasix;  5. Acute on chronic kidney failure - clinically fluid overloaded, but kidney cannot tolerate Lasix, Lasix DC'ed; BMP in am   6. Anemia - Hgb drop; DC heparin;   7. Trochanteric hip fracture, status post surgical repair 7/10/2017 - orthopedics following  8. History of seizure disorder - Keppra  9. COPD - on vent; duonebs; anticipate NC O2 when extubated     Principal Problem:    Hip fracture  Active Problems:    Acute postoperative respiratory failure    Respiratory failure, post-operative    Pneumonia of right lower lobe due to infectious organism    Single kidney    UTI (urinary tract infection)    Benign essential hypertension    Moderate COPD (chronic obstructive pulmonary disease)    Macrocytic anemia    Acute on chronic kidney failure    Meningioma    Diet: OG renal  Fluids: normal saline 25 mL/hr   VTE Prophylaxis: hose and foot pumps    Status: inpatient  Length of Stay: 5     Patient dicussed with attending ICU physician, Dr.Thomas Viveros, who agrees with the plan.     Ruba Fuller PGY1 7/14/2017  [facility]   Pager: [phone] (from 8AM-5:30PM)    Subjective/Interval events:      ROS: intubated and sedated    Objective  Vital signs in last 24 hours  Temp:  [97.9  F (36.6  C)-99  F (37.2  C)] 97.9  F (36.6  C)  Heart Rate:  [] 95  Resp:  [11-36] 23  BP: ()/(47-77) 131/70  FiO2 " (%):  [40 %-45 %] 40 %  153 lb 9.6 oz (69.7 kg)  Physical Exam  General appearance: appears stated age and sedated on vent  Neck: no carotid bruit and supple, symmetrical, trachea midline  Lungs: rales bibasilar  Heart: irregularly irregular rhythm, no click, no rub and distant heart sounds  Abdomen: soft, non-tender; bowel sounds normal; no masses,  no organomegaly  Extremities: no deformity; trace pedal edema; 2 gold rings with white stones on L 4th finger; cap refill <2 seconds  Pulses: 2+ and symmetric  Skin: Skin color, texture, turgor normal. No rashes or lesions    Intake/Output last 3 shifts  I/O last 3 completed shifts:  In: 2914.4 [I.V.:882.4; NG/GT:1282; IV Piggyback:750]  Out: 335 [Urine:335]  Pertinent Labs     Results from last 7 days  Lab Units 07/14/17  0307 07/13/17  1645 07/13/17  1009 07/13/17  0429  07/12/17  0454   LN-WHITE BLOOD CELL COUNT thou/uL 7.1  --   --  7.5  --  3.8*   LN-HEMOGLOBIN g/dL 8.4* 9.1* 9.1* 9.0*  < > 9.3*   LN-HEMATOCRIT % 26.3*  --   --  25.8*  --  26.9*   LN-PLATELET COUNT thou/uL 103*  --   --  111*  --  113*   < > = values in this interval not displayed.    Results from last 7 days  Lab Units 07/14/17  0307 07/13/17  0429 07/12/17  0454   LN-SODIUM mmol/L 143  143 144 144   LN-POTASSIUM mmol/L 4.1  4.1 4.0 4.7   LN-CHLORIDE mmol/L 107  107 108* 110*   LN-CO2 mmol/L 25  25 26 22   LN-BLOOD UREA NITROGEN mg/dL 76*  76* 56* 45*   LN-CREATININE mg/dL 3.32*  3.32* 2.77* 2.35*   LN-CALCIUM mg/dL 7.8*  7.8* 8.0* 8.2*   LN-ALBUMIN g/dL 2.7*  2.7* 2.5* 2.6*   LN-PROTEIN TOTAL g/dL 4.8* 4.7* 4.8*   LN-BILIRUBIN TOTAL mg/dL 0.3 0.4 0.5   LN-ALKALINE PHOSPHATASE U/L 53 49 53   LN-ALT (SGPT) U/L <6 <6 <6   LN-AST (SGOT) U/L 15 10 10       Results from last 7 days  Lab Units 07/14/17  0307 07/13/17  0429 07/12/17  0454  07/09/17  1440   LN-INR  1.14* 1.15* 1.17*  < > 1.01   LN-PARTIAL THROMBOPLASTIN TIME seconds  --   --   --   --  39*   < > = values in this interval not  displayed.  Pertinent Radiology     Current Medications.     acetaminophen  1,000 mg Enteral Tube TID    Or     acetaminophen  650 mg Rectal TID     albumin human  25 g Intravenous Q6H     budesonide  0.5 mg Nebulization BID - RT     chlorhexidine  15 mL Topical Q12H     formoterol fumarate  20 mcg Nebulization Q12H     gabapentin  100 mg Enteral Tube QHS     insulin aspart (NovoLOG) injection   Subcutaneous Q6H     ipratropium-albuterol  3 mL Nebulization Q4H - RT     iron sucrose (VENOFER) IVPB  100 mg Intravenous Q24H     levETIRAcetam  (KEPPRA) solution 100 mg/mL  500 mg Enteral Tube BID    Or     levETIRAcetam (KEPPRA) IVPB  500 mg Intravenous BID     methylPREDNISolone sodium succinate  60 mg Intravenous Q6H     omeprazole  20 mg Enteral Tube QHS    Or     omeprazole  20 mg Oral QHS    Or     pantoprazole  40 mg Intravenous QHS     piperacillin-tazobactam (ZOSYN) IV  3.375 g Intravenous Q12H     polyethylene glycol  17 g Oral DAILY     rOPINIRole  2 mg Enteral Tube QHS     senna-docusate  1 tablet Oral BID    Or     senna (SENOKOT) syrup  8.8 mg Enteral Tube BID     sodium chloride  10-30 mL Intravenous Q8H FIXED TIMES     sodium chloride  3 mL Intravenous Line Care     sodium polystyrene sulfonate  30 g Enteral Tube Once     vecuronium (NORCURON) syringe 1 mg/1 mL  0.1 mg/kg Intravenous Once     viscous lidocaine HC  15 mL Oral Once     white petrolatum-mineral oil   Both Eyes Q8H     PRN:acetaminophen, albuterol, aluminum-magnesium hydroxide-simethicone, bacitracin, benzocaine-menthol, bisacodyl, dextrose 50 % (D50W), fentaNYL 1500 mcg/150 mL in NS (10 mcg/ml), fentaNYL pf, glucagon (human recombinant), HYDROmorphone, insulin regular infusion 0.5 unit/mL, lidocaine, lipase-protease-amylase **AND** sodium bicarbonate, loratadine, LORazepam, metoclopramide **OR** metoclopramide **OR** metoclopramide, naloxone **OR** naloxone, ondansetron **OR** ondansetron, oxyCODONE, polyvinyl alcohol, sodium chloride, sodium  chloride, sodium chloride, sodium phosphates 133 mL    This note was created with help of Dragon dictation system. Grammatical /typing errors are not intentional.

## 2021-06-11 NOTE — PROGRESS NOTES
Pulmonology ICU Daily Progress Note    Assessment & Plan: Jhoana Ramirez is a 83 y.o. female with a past medical history of meningioma, CAD, CKD3 with single kidney, COPD on home O2, HTN, and anemia admitted to ICU for acute hypoxic respiratory failure post-surgery. She had a L trochanteric fracture that was pinned on 7/10/2017.     1. Acute postoperative respiratory failure - repeat wean today; goal to extubate;   2. Pneumonia - likely aspiration; per CT and clinical findings; Day 1 Zosyn; sputum culture negative for organisms at 48 hours; consider narrowing ABx.   3. Urinary Tract Infection - pan-sensitive E.coli on culture; Day 1 Zosyn; considering narrowing ABx.  4. Acute on Chronic Kidney Failure - BUN and Cr continued to rise; received 80mg Lasix 7/13/2017 am;  dropped Lasix to 40mg daily, starting 7/14/2017; CMP daily   5. Anemia - improved and stable 48 hours;   6. Trochanteric hip fracture, status post surgical repair 7/10/2017 - orthopedics following  7. History of Seizure Disorder - Keppra  8. COPD - on vent, RT consulted  9. Pleural effusions - on CT; clinically stable    Principal Problem:    Hip fracture  Active Problems:    Acute postoperative respiratory failure    Respiratory failure, post-operative    Pneumonia of right lower lobe due to infectious organism    Single kidney    UTI (urinary tract infection)    Benign essential hypertension    Moderate COPD (chronic obstructive pulmonary disease)    Macrocytic anemia    Acute on chronic kidney failure    Diet: OG, regular  Fluids: 0.9% NaCl at 25mL/hr  VTE Prophylaxis: heparin 5000 U SubQ daily    Barriers to discharge: on vent  Status: inpatient  Length of Stay: 4     Patient dicussed with attending ICU physician, Dr.Thomas Viveros, who agrees with the plan.     Ruba Fuller PGY1 7/13/2017  [facility]   Pager: [phone] (from 8AM-5:30PM)    Subjective/Interval events:  Became responsive overnight and monitor showed a  run of [employer]. Vital signs otherwise stable;   ROS: intubated and sedated;     Objective  Vital signs in last 24 hours  Temp:  [98  F (36.7  C)-99  F (37.2  C)] 99  F (37.2  C)  Heart Rate:  [] 112  Resp:  [16-24] 18  BP: ()/(48-83) 97/60  FiO2 (%):  [45 %-100 %] 45 %  153 lb 9.6 oz (69.7 kg)  Physical Exam  General appearance: appears stated age and sedated on vent;   Head: Normocephalic, without obvious abnormality, atraumatic  Throat: normal findings: lips normal without lesions and abnormal findings: edentulous  Lungs: rhonchi anterior - left; otherwise clear; some retraction at supraclavicular fossae during inspirations  Heart: regular rate and rhythm, S1, S2 normal, systolic murmur: early systolic 2/6, blowing at lower left sternal border, no click and no rub  Abdomen: soft, non-tender; bowel sounds normal; no masses,  no organomegaly  Extremities: extremities normal, atraumatic, no cyanosis or edema and compression stockings and booties in place; two gold rings with white stones on L 4th finger  Pulses: 2+ and symmetric  Skin: Skin color, texture, turgor normal. No rashes or lesions    Intake/Output last 3 shifts  I/O last 3 completed shifts:  In: 2631.6 [I.V.:1288.1; NG/GT:1039; IV Piggyback:304.5]  Out: 2550 [Urine:2550]  Pertinent Labs     Results from last 7 days  Lab Units 07/13/17  0429 07/13/17  0006 07/12/17 0454 07/11/17  1052   LN-WHITE BLOOD CELL COUNT thou/uL 7.5  --  3.8* 3.3*   LN-HEMOGLOBIN g/dL 9.0* 9.5* 9.3* 7.3*   LN-HEMATOCRIT % 25.8*  --  26.9* 22.9*   LN-PLATELET COUNT thou/uL 111*  --  113* 108*       Results from last 7 days  Lab Units 07/13/17  0429 07/12/17  0454 07/11/17  2016  07/09/17  1440   LN-SODIUM mmol/L 144 144 142  < > 142   LN-POTASSIUM mmol/L 4.0 4.7 4.8  < > 5.3*   LN-CHLORIDE mmol/L 108* 110* 111*  < > 107   LN-CO2 mmol/L 26 22 20*  < > 27   LN-BLOOD UREA NITROGEN mg/dL 56* 45* 42*  < > 19   LN-CREATININE mg/dL 2.77* 2.35* 2.21*  < > 1.66*   LN-CALCIUM mg/dL  8.0* 8.2* 8.1*  < > 8.4*   LN-ALBUMIN g/dL 2.5* 2.6*  --   --  3.1*   LN-PROTEIN TOTAL g/dL 4.7* 4.8*  --   --  5.8*   LN-BILIRUBIN TOTAL mg/dL 0.4 0.5  --   --  0.4   LN-ALKALINE PHOSPHATASE U/L 49 53  --   --  77   LN-ALT (SGPT) U/L <6 <6  --   --  <6   LN-AST (SGOT) U/L 10 10  --   --  12   < > = values in this interval not displayed.    Results from last 7 days  Lab Units 07/13/17  0429 07/12/17  0454 07/11/17  0423 07/09/17  1440   LN-INR  1.15* 1.17* 1.24* 1.01   LN-PARTIAL THROMBOPLASTIN TIME seconds  --   --   --  39*     Pertinent Radiology  Ct Head Without Contrast    Result Date: 7/12/2017  [facility] CT HEAD WO CONTRAST 7/12/2017 1:21 PM   CONCLUSION:   1.  Redemonstration of the large extra-axial mass in the left frontal convexity with surrounding large area of presumed vasogenic edema in the left frontal lobe, not significantly changed compared to brain MRI 01/30/2016 given the differences in modalities.   2.  The associated mass effect is also stable with mild effacement of the anterior horn of the left lateral ventricle and 5.8 mm midline shift to the right at the level of the lateral ventricles.   3.  Mild diffuse cerebral parenchymal volume loss. Presumed chronic hypertensive or microvascular ischemic white matter changes.   4.  Moderate sized areas of encephalomalacia in the right occipital lobe and posterior medial right temporal lobe.   5.  No intracranial hemorrhage or CT evidence for an acute infarct.     Current Medications.     acetaminophen  1,000 mg Enteral Tube TID    Or     acetaminophen  650 mg Rectal TID     budesonide  0.5 mg Nebulization BID - RT     chlorhexidine  15 mL Topical Q12H     formoterol fumarate  20 mcg Nebulization Q12H     [START ON 7/14/2017] furosemide  40 mg Intravenous DAILY     gabapentin  100 mg Enteral Tube QHS     heparin (PF)  5,000 Units Subcutaneous Q12H 09-21     insulin aspart (NovoLOG) injection   Subcutaneous Q6H     ipratropium-albuterol  3 mL  Nebulization Q4H - RT     iron sucrose (VENOFER) IVPB  100 mg Intravenous Q24H     levETIRAcetam  (KEPPRA) solution 100 mg/mL  500 mg Enteral Tube BID    Or     levETIRAcetam (KEPPRA) IVPB  500 mg Intravenous BID     methylPREDNISolone sodium succinate  60 mg Intravenous Q6H     omeprazole  20 mg Enteral Tube QHS    Or     omeprazole  20 mg Oral QHS    Or     pantoprazole  40 mg Intravenous QHS     piperacillin-tazobactam (ZOSYN) IV  3.375 g Intravenous Q12H     polyethylene glycol  17 g Oral DAILY     rOPINIRole  2 mg Enteral Tube QHS     senna-docusate  1 tablet Oral BID    Or     senna (SENOKOT) syrup  8.8 mg Enteral Tube BID     sodium chloride  10-30 mL Intravenous Q8H FIXED TIMES     sodium chloride  3 mL Intravenous Line Care     sodium polystyrene sulfonate  30 g Enteral Tube Once     vecuronium (NORCURON) syringe 1 mg/1 mL  0.1 mg/kg Intravenous Once     viscous lidocaine HC  15 mL Oral Once     white petrolatum-mineral oil   Both Eyes Q8H     PRN:acetaminophen, albuterol, aluminum-magnesium hydroxide-simethicone, bacitracin, benzocaine-menthol, bisacodyl, dextrose 50 % (D50W), fentaNYL 1500 mcg/150 mL in NS (10 mcg/ml), fentaNYL pf, glucagon (human recombinant), HYDROmorphone, insulin regular infusion 0.5 unit/mL, lidocaine, lipase-protease-amylase **AND** sodium bicarbonate, loratadine, LORazepam, metoclopramide **OR** metoclopramide **OR** metoclopramide, naloxone **OR** naloxone, ondansetron **OR** ondansetron, oxyCODONE, polyvinyl alcohol, sodium chloride, sodium chloride, sodium chloride, sodium phosphates 133 mL    This note was created with help of Dragon dictation system. Grammatical /typing errors are not intentional.     Patient seen and examined with the medical resident.  Exam findings confirmed.  Labs x-rays all pertinent data reviewed.  Patient discussed with nephrology.  CT scan results reviewed from last night.  Patient with what appears to be meningioma there is some midline shift and  compression of the ventricle but unchanged since 2016 patient is on steroids at this point time will have neurosurgery take a look at the patient in the morning.  Patient remains critically ill and critical care time today was 45 minutes    Wellington Viveros MD

## 2021-06-11 NOTE — PROGRESS NOTES
Vent Mode: VCV  FiO2 (%):  [40 %-100 %] 40 %  S RR:  [18] 18  S VT:  [450 mL] 450 mL  PEEP/CPAP (cm H2O):  [5 cm H2O] 5 cm H2O  Minute Ventilation (L/min):  [4.1 L/min-9.2 L/min] 7.5 L/min  PIP:  [9 cm H2O-49 cm H2O] 49 cm H2O  CO SUP:  [5 cm H20] 5 cm H20  MAP (cm H2O):  [5-12] 12    Pt remains on full vent support, settings above, ETT 7.0-20@Teeth, BS clear/diminished. Pt weaned on CPAP/PS 5/5 for 783 mins, placed back on full vent support for the night, no complications. RT Following.     JADEN AdameT

## 2021-06-11 NOTE — ANESTHESIA POSTPROCEDURE EVALUATION
"Patient was on fent and dex gtt.  VSS.  Still on vent.  She was coherant and mouthed \"i can't talk.\"  Oxygen was weaned.  abg more favorable.  Appreciate help if ICU team for vent weaning.      Angie Mills M.D.  Department of Anesthesiology      "

## 2021-06-11 NOTE — PROGRESS NOTES
"RENAL PROGRESS NOTE     CC: FRANK    ROS: No new issues present. Intubated, ventilated, sedated, unable to participate in ROS.     Assessment and Plan:    1. FRANK: Likely heomdynamic shifts intraoperatively or shortly after. She has significant risk factors for ATN, a unilateral kidney, vascular disease with smoking history and underlying advanced CKD. Creatinine beginning to improve and so far making good urine, she does not appear grossly hypervolemic. Urine output improved with better hemodynamics suggest this is the primary  of her FRANK. Continue to support BP as best as possible. She has no dialysis indications and want to avoid this in this frail elderly woman.   2. CKD stage 4: Baseline creatinine of 2 mg/dL, unilateral kidney. Was removed decades ago, reasons unclear.  3. Hypotension: Secondary to sedation vs acute illness. No pressor requirements. Judicious fluid management due to potential renal failure, hypervolemia.  4. Hypernatremia. Mild, monitor.   5. Anemia: Secondary to CKD, trend. Venofer course due to iron deficiency.   6. Hyperkalema: Normalized after high dose diuretics, hold for now and trend.  7. Acute Respiratory failure: On ventilatory support. Significant underlying COPD due to tobacco use. On chronic oxygen at home. Expect prolonged ventilator dependence but ICU to start weaning      Yoav Dave MD FACP FASN  Nephrology    PHYSICAL EXAM  Vitals:    07/15/17 1014   BP:    Pulse: 95   Resp:    Temp:    SpO2: 91%     /69  Pulse 95  Temp 98.3  F (36.8  C) (Oral)   Resp 23  Ht 5' 6\" (1.676 m)  Wt 160 lb 1.6 oz (72.6 kg)  SpO2 91%  BMI 25.84 kg/m2      Intake/Output Summary (Last 24 hours) at 07/15/17 1128  Last data filed at 07/15/17 0808   Gross per 24 hour   Intake          3880.81 ml   Output             3800 ml   Net            80.81 ml     Vent Mode: CPAP/PSV  FiO2 (%):  [30 %-100 %] 30 %  S RR:  [18] 18  S VT:  [450 mL] 450 mL  PEEP/CPAP (cm H2O):  [5 cm H2O] 5 cm " H2O  Minute Ventilation (L/min):  [7.5 L/min-11.7 L/min] 11.6 L/min  PIP:  [14 cm H2O-45 cm H2O] 19 cm H2O  MA SUP:  [5 cm H20-8 cm H20] 5 cm H20  MAP (cm H2O):  [7-11] 8  Wt Readings from Last 3 Encounters:   07/15/17 160 lb 1.6 oz (72.6 kg)   07/11/16 163 lb (73.9 kg)   02/22/16 144 lb 12 oz (65.7 kg)       GENERAL: Chronically ill and debilitated.  HEAD: Normal  HEENT: ETT in place. No eyelid edema.  CARDIOVASCULAR: Unable to assess JVP, peripheral dependent edema.   PULMONARY: No respiratory distress while on ventilatory support. No cyanosis  GASTROINTESTINAL: No ascites present  MSK: Diffuse muscle wasting, no joint deformities  NEURO: Sedated, not interactive.   PSYCHIATRIC: Sedated, not interactive.   SKIN: Pale, no jaundice, no rash.    LABORATORIES    Results from last 7 days  Lab Units 07/15/17  0514 07/14/17  1137 07/14/17 0307 07/13/17  0429   LN-WHITE BLOOD CELL COUNT thou/uL 5.6  --  7.1  --  7.5   LN-HEMOGLOBIN g/dL 9.0* 8.4* 8.4*  < > 9.0*   LN-HEMATOCRIT % 27.3*  --  26.3*  --  25.8*   LN-PLATELET COUNT thou/uL 101*  --  103*  --  111*   < > = values in this interval not displayed.    Results from last 7 days  Lab Units 07/15/17  0514 07/14/17  0307 07/13/17  0429   LN-SODIUM mmol/L 146* 143  143 144   LN-POTASSIUM mmol/L 3.8 4.1  4.1 4.0   LN-CHLORIDE mmol/L 107 107  107 108*   LN-CO2 mmol/L 28 25  25 26   LN-BLOOD UREA NITROGEN mg/dL 90* 76*  76* 56*   LN-CREATININE mg/dL 2.94* 3.32*  3.32* 2.77*   LN-CALCIUM mg/dL 8.4* 7.8*  7.8* 8.0*   LN-PROTEIN TOTAL g/dL 5.4* 4.8* 4.7*   LN-BILIRUBIN TOTAL mg/dL 0.6 0.3 0.4   LN-ALKALINE PHOSPHATASE U/L 52 53 49   LN-ALT (SGPT) U/L <6 <6 <6   LN-AST (SGOT) U/L 14 15 10       Results from last 7 days  Lab Units 07/15/17  0514  07/09/17  1440   LN-INR  1.13*  < > 1.01   LN-PARTIAL THROMBOPLASTIN TIME seconds  --   --  39*   < > = values in this interval not displayed.    Results from last 7 days  Lab Units 07/10/17  1321   LN-ABORH  O POS       Results  from last 7 days  Lab Units 07/15/17  0514   LN-MAGNESIUM mg/dL 2.1       RADIOLOGY REPORTS    ECHOCARDIOGRAM    MEDICATIONS    acetaminophen  1,000 mg Enteral Tube TID    Or     acetaminophen  650 mg Rectal TID     albumin human  25 g Intravenous Q6H     chlorhexidine  15 mL Topical Q12H     gabapentin  100 mg Enteral Tube QHS     insulin aspart (NovoLOG) injection   Subcutaneous Q6H     ipratropium-albuterol  3 mL Nebulization QID - RT     iron sucrose (VENOFER) IVPB  100 mg Intravenous Q24H     levETIRAcetam  (KEPPRA) solution 100 mg/mL  500 mg Enteral Tube BID    Or     levETIRAcetam (KEPPRA) IVPB  500 mg Intravenous BID     methylPREDNISolone sodium succinate  40 mg Intravenous Q6H     omeprazole  20 mg Enteral Tube QHS    Or     omeprazole  20 mg Oral QHS    Or     pantoprazole  40 mg Intravenous QHS     piperacillin-tazobactam (ZOSYN) IV  3.375 g Intravenous Q12H     polyethylene glycol  17 g Oral DAILY     rOPINIRole  2 mg Enteral Tube QHS     senna-docusate  1 tablet Oral BID    Or     senna (SENOKOT) syrup  8.8 mg Enteral Tube BID     sodium chloride  10-30 mL Intravenous Q8H FIXED TIMES     sodium chloride  3 mL Intravenous Line Care     vecuronium (NORCURON) syringe 1 mg/1 mL  0.1 mg/kg Intravenous Once     white petrolatum-mineral oil   Both Eyes Q8H     acetaminophen, albuterol, aluminum-magnesium hydroxide-simethicone, bacitracin, benzocaine-menthol, bisacodyl, dextrose 50 % (D50W), fentaNYL 1500 mcg/150 mL in NS (10 mcg/ml), fentaNYL pf, glucagon (human recombinant), HYDROmorphone, insulin regular infusion 0.5 unit/mL, lidocaine, lipase-protease-amylase **AND** sodium bicarbonate, loratadine, LORazepam, metoclopramide **OR** metoclopramide **OR** metoclopramide, naloxone **OR** naloxone, ondansetron **OR** ondansetron, oxyCODONE, polyvinyl alcohol, sodium chloride, sodium chloride, sodium chloride, sodium phosphates 133 mL    fentaNYL 1500 mcg/150 mL in NS (10 mcg/ml) 50 mcg/hr (07/15/17 0000)      insulin regular infusion 0.5 unit/mL       midazolam (VERSED) infusion 4 mg/hr (07/15/17 5324)     nacl 0.9% 25 mL/hr (07/12/17 5885)         Above laboratories and medications were personally reviewed during evaluation today.

## 2021-06-11 NOTE — PROGRESS NOTES
Pharmacy Note - Admission Medication History    Pertinent Provider Information:      ______________________________________________________________________    Prior To Admission (PTA) med list completed and updated in EMR.       PTA Med List   Medication Sig Last Dose     albuterol (PROVENTIL HFA;VENTOLIN HFA) 90 mcg/actuation inhaler Inhale 1-2 puffs every 4 (four) hours as needed. Every 4-6 hours as needed. Unknown at Unknown time     amLODIPine (NORVASC) 10 MG tablet Take 10 mg by mouth daily. 7/9/2017 at Unknown time     budesonide-formoterol (SYMBICORT) 80-4.5 mcg/actuation inhaler Inhale 2 puffs 2 (two) times a day. 7/9/2017 at Unknown time     levETIRAcetam (KEPPRA) 500 MG tablet Take 500 mg by mouth 2 (two) times a day. 7/9/2017 at Unknown time     omeprazole (PRILOSEC) 20 MG capsule Take 20 mg by mouth daily. 7/9/2017 at Unknown time     rOPINIRole (REQUIP) 2 MG tablet Take 2 mg by mouth at bedtime. 7/8/2017 at Unknown time       Information source(s): Patient and Patient's pharmacy    Summary of Changes to PTA Med List  New: symbicortDiscontinued: spiriva  Changed:     Patient was asked about OTC/herbal products specifically.  PTA med list reflects this.    Based on the pharmacist s assessment, the PTA med list information appears reliable    Patient appears compliant: Yes    Allergies were reviewed, assessed, and updated with the patient.      Medications available for use during hospital stay: albuterol.     Thank you for the opportunity to participate in the care of this patient.    Orlando Patel [facility]  7/9/2017 3:10 PM

## 2021-06-11 NOTE — PROGRESS NOTES
ORTHOPEDICS REMOTE NOTE    Pt continues on vent, sedated.     Continue current cares; daily dressing changes at right hip.   PT/OT when appropriate.   WBAT.   Heparin SQ for deep venous thrombosis prophylaxis; recommend ASA 325mg BID once able to take orals.   Will continue to follow peripherally.     Pancho Mckay PA-C..................3:32 PM  [facility]

## 2021-06-11 NOTE — PROGRESS NOTES
Resp care: patient arrived in ICU from PACU, S/P L-hip fixation.. Patient remained intubated with 7.0 size ETT and secured 21 @teeth ( was 23 @  the teeth, adjust post CXR). Patient placed on full vent support ( used 8ml/Kg), tolerated well. See respiratory vent flow sheet for vent setting and reading. BS are clear  throughout. Will draw an ABG in 30min and make change with the vent setting if necessary. Will continue to monitor SpO2/ABG, CXR, suction prn, and assess for PS trials when appropriates.      Randy Portillo

## 2021-06-11 NOTE — PROGRESS NOTES
"  RESPIRATORY CARE NOTE     Patient Name: Jhoana Ramirez  Today's Date: 7/11/2017        /61  Pulse (!) 54  Temp 97.9  F (36.6  C) (Oral)   Resp 21  Ht 5' 6\" (1.676 m)  Wt 149 lb 1.6 oz (67.6 kg)  SpO2 100%  BMI 24.07 kg/m2    Arterial Blood Gas result:  pH 7.29; pCO2 47; pO2 110; HCO3 22, on 35.    Vent Mode: VCV  FiO2 (%):  [30 %-100 %] 45 %  S RR:  [15-16] 16  S VT:  [350 mL-450 mL] 450 mL  PEEP/CPAP (cm H2O):  [3 cm H2O-5 cm H2O] 5 cm H2O  Minute Ventilation (L/min):  [5.4 L/min-7.8 L/min] 7.6 L/min  PIP:  [19 cm H2O-36 cm H2O] 27 cm H2O  AR SUP:  [10 cm H20] 10 cm H20  MAP (cm H2O):  [8-9] 9    Pt transferred to CT without incident. Multiple SBTs were attempted on 10/5. Pt apneic when sleeping. Pt de sats in low 80's when agitated.Sxn lg bloody mucus plug. Secretions thin, white, frothy. Will continue to attempt SBT's.      Sulema Johnson, LRT    "

## 2021-06-11 NOTE — DISCHARGE SUMMARY
[facility] Discharge Summary    Primary Care Physician:  Sergey Lopez MD  Discharge Provider: Jose Ram   Consult/s: pulmonary/intensive care, nephrology and orthopedic surgery  Admission Date: 7/9/2017.   Admission Diagnoses:   Pneumothorax on right [J93.9]  Generalized weakness [R53.1]  Fall, initial encounter [W19.XXXA]  Hip fracture, left, closed, initial encounter [S72.002A]  Chest pain, unspecified type [R07.9]  Closed displaced subtrochanteric fracture of left femur, initial encounter [S72.22XA]   Discharge Date: 7/19/2017  Disposition: [facility]  Condition at Discharge: Stable  Code Status: DNR  Principal Diagnosis:  Hip fracture  Discharge Diagnoses:    Principal Problem:    Hip fracture  Active Problems:    Benign essential hypertension    Moderate COPD (chronic obstructive pulmonary disease)    Macrocytic anemia    Acute postoperative respiratory failure    Respiratory failure, post-operative    Acute on chronic kidney failure    Pneumonia of right lower lobe due to infectious organism    UTI (urinary tract infection)    Single kidney    Meningioma    Hip fracture, left, closed, with routine healing, subsequent encounter    Reason for Admission:   Jhoana Ramirez is a 83 y.o. female with a past medical history of tobacco dependence, COPD, hypertension, and macrocytic anemia who was admitted secondary to a fall and found to have a left hip fracture.    Hospital Summary:   She presented after a fall at home and was found to have a left hip fracture.  She was seen by orthopedics on day of admission and they recommended surgical correction.  She underwent this on 7/11/17 without any intraoperative complications.  Afterwards she developed respiratory failure and needed to be reintubated in the PACU.  She was admitted to the ICU and remained intubated for several days.  They had difficulty extubating her, but she was successfully extubated on 7/16/17.  She did require brief  periods of BiPAP but has been stable on her home oxygen requirements for several days. She completed 5 days of Zosyn for aspiration pneumonia.  She was seen by speech and language pathology for ongoing care and she was advanced to a regular diet with thin liquids for discharge.    She has CKD stage IV with a baseline creatinine around 2 and unilateral kidney.  She developed an AK I while in the ICU and was followed by renal.  They thought this was due to ATN and she did come down to her baseline before discharge.    She did have a few low blood pressures throughout her stay.  Her home amlodipine was held on discharge.    Of note, one of her sputum cultures did grow aspergillus.  She did not show signs of invasive aspergillosis and was actually on steroids at the time which would have worsened her respiratory status if this was the case.  Likely contaminant, no follow-up needed.    Further problem based discussion below:    Acute post-op respiratory failure: In the setting of severe COPD and oversedation.  Resolved at this time.  Patient was extubated on 7/16 was back to her baseline O2 requirement of 6L prior to d/c.  -Continue home Symbicort 2 puffs twice daily and albuterol inhaler PRN  -Has been on steroids since admission (7/11/17) and was discharged on prednisone for 2 more days  -Repleting magnesium and potassium as needed; wnl on 7/19.     Left trochanteric hip fracture, in setting of unwitnessed fall:        -IM nailing procedure on 7/11.  Tylenol and Fentanyl for pain.  Also getting gabapentin.  Flexeril added for muscle spasms.  -There could possibly be a component of this new-onset peripheral edema causing unsteady gate.  -Up and working with PT and OT, doing well, moving slowly but is able to ambulate with a walker.  -aspirin 81 mg daily.     Hypertension: On Norvasc 10 mg daily at home.  Held due to hypotension this admission.  -Has been normotensive since transfer to [facility] floor.  Although was  hypertensive the last few days in the ICU.  -Can consider restarting home Norvasc at 5 mg per day, per nephrology recommendations, if pressures become elevated.     Macrocytic Anemia: appears to be chronic in nature, as her most recent Hemoglobin readings here in the hospital over the last year range from the 8s to low 10s.  There could have initially been a component of acute blood loss with hip fracture, but at this point appears to be mostly related to her chronic anemia.  - peripheral smear showing a macrocytosis and thrombocytopenia.  - Hemoglobin up to 10.0 on 7/17.  - Nephrology following and thinking related to CKD.  Will initiate oral iron treatment, as the patient was on Venofer in the ICU.     Meningioma: On keppra chronically.  - Repeat CT head showing meningioma and mass effect, likely unchanged from MRI.  - Neurosurgery consulted; patient hospitalized for this meningioma in 2016 and family decided on non-operative approach.  - patient now DNR/DNI after discussion with sons.        Discharge Medications:       Medication List      START taking these medications          aspirin 81 MG EC tablet   Take 1 tablet (81 mg total) by mouth daily.       cyclobenzaprine 5 MG tablet   Commonly known as:  FLEXERIL   Take 1 tablet (5 mg total) by mouth 3 (three) times a day as needed for muscle spasms.       ferrous sulfate 325 (65 FE) MG tablet   Take 1 tablet (325 mg total) by mouth daily with breakfast.   Start taking on:  7/20/2017       oxyCODONE 5 MG immediate release tablet   Commonly known as:  ROXICODONE   Take 1-2 tablets (5-10 mg total) by mouth every 6 (six) hours as needed.       predniSONE 20 MG tablet   Commonly known as:  DELTASONE   Take 2 tablets (40 mg total) by mouth daily with breakfast for 2 days.         CONTINUE taking these medications          albuterol 90 mcg/actuation inhaler   Commonly known as:  PROAIR HFA;PROVENTIL HFA;VENTOLIN HFA       budesonide-formoterol 80-4.5 mcg/actuation  inhaler   Commonly known as:  SYMBICORT       levETIRAcetam 500 MG tablet   Commonly known as:  KEPPRA       omeprazole 20 MG capsule   Commonly known as:  PriLOSEC       rOPINIRole 2 MG tablet   Commonly known as:  REQUIP         STOP taking these medications          amLODIPine 10 MG tablet   Commonly known as:  NORVASC            Where to Get Your Medications      You can get these medications from any pharmacy     Bring a paper prescription for each of these medications      aspirin 81 MG EC tablet     cyclobenzaprine 5 MG tablet     ferrous sulfate 325 (65 FE) MG tablet     oxyCODONE 5 MG immediate release tablet     predniSONE 20 MG tablet             Changes to Home Medications and Reasonin) Added asa for post-op prophylaxis  2) added flexeril/oxycodone for post-op pain/spasm  3) Added ferrous sulfate for anemia  4) Added prednisone for respiratory failure/COPD    Significant Laboratory Studies:     Results from last 7 days  Lab Units 1718 1740 07/15/17  0514   LN-SODIUM mmol/L  --  142 144  144 149* 146*   LN-POTASSIUM mmol/L 3.8 3.6 3.9  3.9 3.8 3.8   LN-CHLORIDE mmol/L  --  94* 97*  97* 107 107   LN-CO2 mmol/L  --  34* 34*  34* 29 28   LN-BLOOD UREA NITROGEN mg/dL  --  98* 96*  96* 89* 90*   LN-CREATININE mg/dL  --  2.01* 2.18*  2.18* 2.32* 2.94*   LN-CALCIUM mg/dL  --  8.8 9.0  9.0 8.7 8.4*   LN-PROTEIN TOTAL g/dL  --   --  5.7* 5.7* 5.4*   LN-BILIRUBIN TOTAL mg/dL  --   --  1.1* 0.8 0.6   LN-ALKALINE PHOSPHATASE U/L  --   --  46 49 52   LN-ALT (SGPT) U/L  --   --  7 <6 <6   LN-AST (SGOT) U/L  --   --  16 16 14       Results from last 7 days  Lab Units 1740 07/15/17  0514   LN-WHITE BLOOD CELL COUNT thou/uL 9.2 7.6 5.6   LN-HEMOGLOBIN g/dL 10.0* 9.2* 9.0*   LN-HEMATOCRIT % 29.7* 27.9* 27.3*   LN-PLATELET COUNT thou/uL 121* 103* 101*     Significant Radiology Studies:    Xr Chest Ap Portable    Result Date: 2017  XR  CHEST AP PORTABLE 7/17/2017 5:02 AM INDICATION: resp fail COMPARISON: Yesterday. FINDINGS: Left PICC tip in the superior vena cava. Persistent infiltrate at the left base. Lungs appear hyperexpanded consistent with emphysema. Normal heart size. No pneumothorax. Skeleton is demineralized.    Xr Hip Left 2 Or More Vws    Result Date: 7/9/2017  XR HIP LEFT 2 OR MORE VWS 7/9/2017 3:39 PM INDICATION: Hip pain.    COMPARISON: None. FINDINGS: Minimal sclerosis along the left femur intertrochanteric region and slight lucency along the greater trochanter, though no definitive fracture. If high suspicion, CT could be performed. No dislocation. Degenerative change of the spine, SI joints and hips.     Xr Abdomen Ap Portable    Result Date: 7/11/2017  XR ABDOMEN AP PORTABLE 7/11/2017 10:32 PM INDICATION: OG tube placement COMPARISON: None. FINDINGS: Nasogastric tube with tip in the proximal stomach. The proximal side-port is at the GE junction. Tube could be advanced 4 to 5 cm.    Ct Head Without Contrast    CONCLUSION: 1.  Redemonstration of the large extra-axial mass in the left frontal convexity with surrounding large area of presumed vasogenic edema in the left frontal lobe, not significantly changed compared to brain MRI 01/30/2016 given the differences in modalities. 2.  The associated mass effect is also stable with mild effacement of the anterior horn of the left lateral ventricle and 5.8 mm midline shift to the right at the level of the lateral ventricles. 3.  Mild diffuse cerebral parenchymal volume loss. Presumed chronic hypertensive or microvascular ischemic white matter changes. 4.  Moderate sized areas of encephalomalacia in the right occipital lobe and posterior medial right temporal lobe. 5.  No intracranial hemorrhage or CT evidence for an acute infarct.     Ct Chest Without Contrast    CONCLUSION: 1.  No pneumothorax. The appearance on chest x-ray due to skin folds. 2.  ET tube in good position. 3.  Tiny  bilateral pleural effusions with mild atelectasis both lung bases.    Ct Lumbar Spine Without Contrast    CONCLUSION: 1.  No acute fracture. 2.  Right L5 pars defect, unchanged. 3.  Advanced multilevel degenerative disc and facet disease. 4.  At L3-L4 there is mild to moderate spinal canal narrowing with mild to moderate left and mild right neural foraminal narrowing. 5.  At L4-L5 there is moderate spinal canal stenosis with narrowing of the right lateral recess. Severe left and moderate right neural foraminal narrowing. 6.  At L5-S1 there is mild spinal canal narrowing with narrowing of the right lateral recess. Severe right and mild left neural foraminal narrowing. 7.  Aortic aneurysmal dilatation to 3.0 cm, similar to 3/16/2016.    Ct Hip Without Contrast Left    CONCLUSION: 1.  Displaced fracture through the left greater trochanter. There is involvement of the intertrochanteric region with a nondisplaced component to the fracture extending into the intertrochanteric region to the base of the lesser trochanter.     Discharge Instructions:  Follow up appointment with Primary Care Physician: Sergey Lopez MD within 1-2 weeks  Follow up appointment with Specialist: Orthopedics, Dr. Graham, in 2-3 weeks  Follow up test / procedure to do as outpatient: follow-up BMP/Mg/hgb   Diet: regular diet, thin liquids  Activity: activity as tolerated, assistive device needed  Restrictions:   Wound / drain care:keep wound clean and dry  The following tests have been done with results pending: none     Physical Exam:    Vital signs in last 24 hours  Temp:  [97.4  F (36.3  C)-98.6  F (37  C)] 97.6  F (36.4  C)  Heart Rate:  [66-80] 80  Resp:  [18-20] 18  BP: ()/(53-74) 93/59  146 lb 6.4 oz (66.4 kg)     Per Dr. Levy on day of discharge:     Physical Exam  General: Alert, interactive, cracking jokes.  Working with occupational therapy to get up from her chair and back to bed.  Very slow with movements but is able to bear  weight with the help of a walker.  Respiratory: Lung sounds are diminished throughout, with end expiratory wheezes bilaterally in the posterior thorax.  Cardiovascular: Heart is regular rate and rhythm, there are no clicks, murmurs, or rubs noted.  Abdomen: Non--tender, non--distended.  Bowel sounds are present.  Belly is soft.  Extremities: No lower extremity edema noted bilaterally.  2 vertical incisions from prior knee surgeries are noted.     This note was created with help of Dragon dictation system. Grammatical/typing errors are not intentional.     The patient was discussed with Dr.Manuel Armenta, who agrees with the plan.     Jose Ram PGY3 7/19/2017  [facility]         7/19/2017  [facility] Faculty Attestation  I have seen and examined the patient.  I have discussed the case with the resident physician(s), Dr. Ram  I agree with the findings, assessment and plan.    Swapnil Armenta MD

## 2021-06-11 NOTE — ANESTHESIA PREPROCEDURE EVALUATION
Anesthesia Evaluation      Patient summary reviewed   No history of anesthetic complications     Airway   Mallampati: III  Neck ROM: limited   Pulmonary    (+) COPD, rhonchi,                          Cardiovascular - normal exam  Exercise tolerance: > or = 4 METS  (+) hypertension, past MI, CAD, ,     ECG reviewed  Rhythm: regular  Rate: normal,      ROS comment: Mild pulm htn     Neuro/Psych    (+) CVA , depression,     Comments: H/o meningioma    Endo/Other    (+) arthritis,      GI/Hepatic/Renal    (+) GERD,   chronic renal disease CRI,      Other findings: Summary   Normal left ventricular cavity size and systolic function.   Left ventricular ejection fraction is visually estimated to be 65 %.   Mild pulmonary hypertension is suggested.      Dental    (+) upper dentures, lower dentures and poor dentition                       Anesthesia Plan  Planned anesthetic: general LMA  Patient has meningioma that hasn't been addressed surgically.  This precludes me from performing a sab.  Will do GA with LMA.  Want to avoid geta bc of her copd.  Her gerd is well controlled.      Type and screen pending.  Due to her anemia and heart history, will just slowly stay ahead and give 1 prbc after incision.    ASA 4     Anesthetic plan and risks discussed with: patient and child/children  Anesthesia plan special considerations: antiemetics,   Post-op plan: routine recovery

## 2021-06-11 NOTE — PROGRESS NOTES
Vent Mode: VCV  FiO2 (%):  [30 %] 30 %  S RR:  [18] 18  S VT:  [450 mL] 450 mL  PEEP/CPAP (cm H2O):  [5 cm H2O] 5 cm H2O  Minute Ventilation (L/min):  [9 L/min-14.4 L/min] 11.7 L/min  PIP:  [17 cm H2O-45 cm H2O] 24 cm H2O  WV SUP:  [5 cm H20] 5 cm H20  MAP (cm H2O):  [7-14] 8    Pt is on above vent settings. 7.0 ETT 20 at teeth. Bs clear and diminished. Suctioned for scant. No changes this shift. Will continue to monitor.    JADEN PruittT

## 2021-06-11 NOTE — PROGRESS NOTES
Attempted to call sons x 2, no answer at this time and voicemail was full. Patient is not alert and orientated at this time to sign consent. Will attempt to call sons again and/or await their arrival.

## 2021-06-11 NOTE — PROGRESS NOTES
[facility] Daily Progress Note    Assessment & Plan: Jhoana Ramirez is a 83 y.o. female with a past medical history of tobacco dependence, COPD, hypertension, and macrocytic anemia who was admitted secondary to a fall and found to have a left hip fracture.    Principal Problem:    Hip fracture  Active Problems:    Benign essential hypertension    Moderate COPD (chronic obstructive pulmonary disease)    Macrocytic anemia    Acute postoperative respiratory failure    Respiratory failure, post-operative    Acute on chronic kidney failure    Pneumonia of right lower lobe due to infectious organism    UTI (urinary tract infection)    Single kidney    Meningioma    Hip fracture, left, closed, with routine healing, subsequent encounter    Acute post-op respiratory failure: In the setting of severe COPD and oversedation.  Resolved at this time.  Patient was extubated on 7/16 and is now back on nasal cannula for oxygen supplementation, which the patient had been using at home.  -Do not continue to wean the oxygen, as patient is on 6 L home O2 at baseline.  -Continue Symbicort 2 puffs twice daily and albuterol inhaler as needed.    -Will transition methylprednisolone IV to prednisone p.o. 40 mg tomorrow morning, and will need to taper off given duration of treatment.  -Repleting magnesium and potassium as needed.    Left trochanteric hip fracture, in setting of unwitnessed fall:        -IM nailing procedure on 7/11.  Tylenol and Fentanyl for pain.  Also getting gabapentin.  -There could possibly be a component of this new-onset peripheral edema causing unsteady gate.  -Up and working with PT and OT today.  -Aspirin 325 mg twice daily, per orthopedic surgery recommendations.  -Will need transition to TCU at time of discharge for continuing rehab services.  -Nithya-Colace for constipation.    Hypertension: On Norvasc 10 mg daily at home.    -Has been normotensive since transfer to [facility].  Although was  hypertensive the last few days in the ICU.  -Can consider restarting home Norvasc at 5 mg per day, per nephrology recommendations, if pressures become elevated today.    Macrocytic Anemia: appears to be chronic in nature, as her most recent Hemoglobin readings here in the hospital over the last year range from the 8s to low 10s.  There could have initially been a component of acute blood loss with hip fracture, but at this point appears to be mostly related to her chronic anemia.  - peripheral smear showing a macrocytosis and thrombocytopenia.  - If patient is symptomatic or Hgb drops below 7.0, can transfuse 1 unit pRBCs.  - Hemoglobin up to 10.0 on 7/17.  - Nephrology following and thinking related to CKD.  Can consider oral iron supplementation now the patient is out of the ICU, given discontinuation of Venofer course.    Meningioma: Transition IV Keppra to oral Keppra.  - Repeat CT head showing meningioma and mass effect, likely unchanged from MRI.  - Neurosurgery consulted; patient hospitalized for this meningioma in 2016 and family decided on non-operative approach.  - patient now DNR/DNI after discussion with sons.     Diet: Dysphagia diet, as recommended status post swallow study.  Fluids: Discontinue fluids at this time, as patient is taking well orally.  VTE Prophylaxis: Heparin SQ injections.    Discharge Planning discussed with [facility].  Barriers to discharge:  Medical -pending stabilization and transfer to TCU.  Anticipated discharge day: TBD.  Disposition: Transitional care.  Status: inpatient admit  Length of Stay: 9     Patient discussed with attending [facility] physician, Dr. Swapnil Armenta, who agrees with the plan.     Manuel Levy PGY1 7/18/2017  [facility] Medicine   Pager: [phone] (from 8AM-5:30PM)    This is a [facility] Patient.  Please call the senior pager with any questions or concerns, 24/7. [phone]  [phone]    Subjective/Interval events:  Extubated yesterday, tolerated well with CPAP on.  Was conversant with sons in the room in the afternoon.  Overnight, continued to improved and was transitioned to nasal cannula for oxygen support, per nursing staff was conversant and did well with nasal cannula for 4 hours.  Was transitioned to BiPap for the evening.  This morning, is conversant, oriented to time and place, wants to get up and move around.  Taking sips of water, passed initial swallow eval.  Now with elevated blood pressures.      Objective  Vital signs in last 24 hours  Temp:  [97.5  F (36.4  C)-97.8  F (36.6  C)] 97.5  F (36.4  C)  Heart Rate:  [71-90] 79  Resp:  [20-29] 20  BP: (110-139)/(57-82) 137/69  146 lb 6.4 oz (66.4 kg)     Physical Exam  General: Alert, interactive, able to converse with me and move freely.  Oriented to person and place, but not time.  Respiratory: Lung sounds are diminished throughout, but I do not appreciate focal consolidation or egophony in the posterior thorax.        Cardiovascular: Heart is regular rate and rhythm, with occasional skipped beats.  No clicks, murmurs, or rubs are appreciated.  Abdomen: Nontender, nondistended.  Bowel sounds are present.  Extremities: No lower extremity edema is noted bilaterally.    Intake/Output last 3 shifts  I/O last 3 completed shifts:  In: 729 [P.O.:70; I.V.:659]  Out: 2275 [Urine:2275]     Pertinent Labs     Results from last 7 days  Lab Units 07/17/17  0422 07/16/17  0540 07/15/17  0514   LN-WHITE BLOOD CELL COUNT thou/uL 9.2 7.6 5.6   LN-HEMOGLOBIN g/dL 10.0* 9.2* 9.0*   LN-HEMATOCRIT % 29.7* 27.9* 27.3*   LN-PLATELET COUNT thou/uL 121* 103* 101*       Results from last 7 days  Lab Units 07/18/17  0618 07/17/17  0422 07/16/17  0540 07/15/17  0514   LN-SODIUM mmol/L 142 144  144 149* 146*   LN-POTASSIUM mmol/L 3.6 3.9  3.9 3.8 3.8   LN-CHLORIDE mmol/L 94* 97*  97* 107 107   LN-CO2 mmol/L 34* 34*  34* 29 28   LN-BLOOD UREA NITROGEN  mg/dL 98* 96*  96* 89* 90*   LN-CREATININE mg/dL 2.01* 2.18*  2.18* 2.32* 2.94*   LN-CALCIUM mg/dL 8.8 9.0  9.0 8.7 8.4*   LN-ALBUMIN g/dL  --  3.8  3.8 3.9 3.5   LN-PROTEIN TOTAL g/dL  --  5.7* 5.7* 5.4*   LN-BILIRUBIN TOTAL mg/dL  --  1.1* 0.8 0.6   LN-ALKALINE PHOSPHATASE U/L  --  46 49 52   LN-ALT (SGPT) U/L  --  7 <6 <6   LN-AST (SGOT) U/L  --  16 16 14       Results from last 7 days  Lab Units 17  0422 17  0540 07/15/17  0514   LN-INR  1.14* 1.14* 1.13*      -repeat Urinalysis on  (-) for nitrites and leukocyte esterase.  Previous urine culture from admission day had grown both E. Coli and Klebsiella.    Pertinent Radiology    CT Head on --no ischemia or hemorrhage.  Vasogenic edema and meningioma in left frontal lobe unchanged in size from MRI in 2016.    Current Medications.     acetaminophen  1,000 mg Oral TID     aspirin  81 mg Oral DAILY     gabapentin  100 mg Oral QHS     heparin (PF)  5,000 Units Subcutaneous Q8H FIXED TIMES     levETIRAcetam  500 mg Oral BID     magnesium sulfate IVPB  2 g Intravenous Once     polyethylene glycol  17 g Oral DAILY     [START ON 2017] predniSONE  40 mg Oral Daily with brkfst     senna-docusate  1 tablet Oral BID     sodium chloride  10-30 mL Intravenous Q8H FIXED TIMES     sodium chloride  3 mL Intravenous Line Care     white petrolatum-mineral oil   Both Eyes Q8H     PRN:acetaminophen, albuterol, aluminum-magnesium hydroxide-simethicone, bacitracin, benzocaine-menthol, bisacodyl, HYDROmorphone, ipratropium-albuterol **AND** [] Nebulizer treatment intermittent, labetalol, lidocaine, loratadine, LORazepam, metoclopramide **OR** [DISCONTINUED] metoclopramide **OR** [DISCONTINUED] metoclopramide, naloxone **OR** naloxone, ondansetron **OR** ondansetron, oxyCODONE, polyvinyl alcohol, sodium chloride, sodium chloride, sodium chloride    This note was created with help of Dragon dictation system. Grammatical /typing errors are not  intentional.        7/18/2017  [facility] Faculty Attestation  I have seen and examined the patient.  I have discussed the case with the resident physician(s), Dr. Levy  I agree with the findings, assessment and plan.    Swapnil Armenta MD

## 2021-06-11 NOTE — PROGRESS NOTES
Pt failed extubation attempt in OR following reintubation. ABG - 7.08/81/48/19.5/86.1. Pt reintubated on first attempt by AMAYA Li CRNA. Positive BSBL. 7.5 ETT. Pt lethargic on BiPAP. Call to Pulm/Critical Care and report given.

## 2021-06-11 NOTE — PROGRESS NOTES
Spiritual Care Note    Spiritual Assessment:   walking by patient's room when she saw patient was in distress. Patient is vented but was awake, trying to sit up and attempting to get out of bed.  alerted staff and took patient's hand and attempted to calm her. Patient seemed anxious and frustrated she could not talk. No family present at this time.     Care Provided:   offered words of comfort and support.     Plan of Care:  Spiritual care will continue to follow as part of patient's care team.    GRACE Loyola, BCC

## 2021-06-11 NOTE — PROGRESS NOTES
07/17/17 0600   Vitals   Heart Rate 68   Resp 19   BP (!) 188/85   MAP (mmHg) (Calculated) 119   Noninvasive BP (Mean) 122   Cardiac Rhythm Normal sinus rhythm   Oxygen Therapy/Pulse Ox   SpO2 96 %   SBP < 170, maintained with PRN Hydralazine 20 mg IV push and Labetalol 20 mg IV push PRN. Patient's pain managed with Dilaudid 0.6 mg IV push PRN. Will continue to monitor patient.

## 2021-06-11 NOTE — PROGRESS NOTES
"[facility] ([facility]) progress note  CC: F/U FRANK  S: Since last visit, remains intubated and sedated. No acute events. Having difficulty weaning from ventilator. She was essentially oliguric yesterday but making far better urine this morning. BPs now normalized, no need for pressors.     A/P:   1. FRANK: Likely heomdynamic shifts intraoperatively or shortly after. She has significant risk factors for ATN, a unilateral kidney, vascular disease with smoking history and underlying advanced CKD. Creatinine continues to trend up but so far making good urine and with excellent UOP on 7/12 (stimulated by diuresis, a good prognostic sign) she does not appear grossly hypervolemic. Urine output improved with better hemodynamics suggest this is the primary  of her FRANK. Continue to support BP as best as possible. She has no dialysis indications and want to avoid this in this frail elderly woman.   2. CKD stage 4: Baseline creatinine of 2 mg/dL, unilateral kidney. Was removed decades ago, reasons unclear.  3. Hypotension: Secondary to sedation vs acute illness. No pressor requirements. Judicious fluid management due to potential renal failure, hypervolemia.  4. Anemia: Secondary to CKD, trend. Venofer course due to iron deficiency.   5. Hyperkalema: Normalized after high dose diuretics, hold for now and trend.  6. Respiratory failure: Significant underlying COPD due to tobacco use. On chronic oxygen at home. Expect prolonged ventilator dependence but ICU to start weaning.     Truong Shen MD  [facility]  Office: [phone]  Pager: [phone]      /75  Pulse (!) 101  Temp 97.9  F (36.6  C)  Resp 26  Ht 5' 6\" (1.676 m)  Wt 153 lb 9.6 oz (69.7 kg)  SpO2 93%  BMI 24.79 kg/m2    I/O last 3 completed shifts:  In: 2914.4 [I.V.:882.4; NG/GT:1282; IV Piggyback:750]  Out: 335 [Urine:335]    Physical Exam:   Gen: NAD, chronically ill appearing  HEENT: No icterus, NC/AT, trachea " intubated  CARDIOVASCULAR: RR, no rub,  Trace leg edema  PULMONARY: CTA ant No cyanosis, poor air movement  GASTROINTESTINAL: soft, NT/ND  MSK: diffuse severe muscle atrophy, warm  NEURO: unresponsive, does not follow commands  PSYCHIATRIC: unable to eval due to encephalopathy  SKIN: Pale, no jaundice, no rash.    Results from last 7 days  Lab Units 07/14/17  0307 07/13/17  0429 07/12/17  0454   LN-SODIUM mmol/L 143  143 144 144   LN-POTASSIUM mmol/L 4.1  4.1 4.0 4.7   LN-CHLORIDE mmol/L 107  107 108* 110*   LN-CO2 mmol/L 25  25 26 22   LN-BLOOD UREA NITROGEN mg/dL 76*  76* 56* 45*   LN-CREATININE mg/dL 3.32*  3.32* 2.77* 2.35*   LN-CALCIUM mg/dL 7.8*  7.8* 8.0* 8.2*   LN-ALBUMIN g/dL 2.7*  2.7* 2.5* 2.6*   LN-PROTEIN TOTAL g/dL 4.8* 4.7* 4.8*   LN-BILIRUBIN TOTAL mg/dL 0.3 0.4 0.5   LN-ALKALINE PHOSPHATASE U/L 53 49 53   LN-ALT (SGPT) U/L <6 <6 <6   LN-AST (SGOT) U/L 15 10 10       Results from last 7 days  Lab Units 07/14/17  0307 07/13/17  1645 07/13/17  1009 07/13/17  0429  07/12/17  0454   LN-WHITE BLOOD CELL COUNT thou/uL 7.1  --   --  7.5  --  3.8*   LN-HEMOGLOBIN g/dL 8.4* 9.1* 9.1* 9.0*  < > 9.3*   LN-HEMATOCRIT % 26.3*  --   --  25.8*  --  26.9*   LN-PLATELET COUNT thou/uL 103*  --   --  111*  --  113*   < > = values in this interval not displayed.      Scheduled Meds:    acetaminophen  1,000 mg Enteral Tube TID    Or     acetaminophen  650 mg Rectal TID     albumin human  25 g Intravenous Q6H     budesonide  0.5 mg Nebulization BID - RT     chlorhexidine  15 mL Topical Q12H     formoterol fumarate  20 mcg Nebulization Q12H     gabapentin  100 mg Enteral Tube QHS     insulin aspart (NovoLOG) injection   Subcutaneous Q6H     ipratropium-albuterol  3 mL Nebulization Q4H - RT     iron sucrose (VENOFER) IVPB  100 mg Intravenous Q24H     levETIRAcetam  (KEPPRA) solution 100 mg/mL  500 mg Enteral Tube BID    Or     levETIRAcetam (KEPPRA) IVPB  500 mg Intravenous BID     methylPREDNISolone sodium  succinate  60 mg Intravenous Q6H     omeprazole  20 mg Enteral Tube QHS    Or     omeprazole  20 mg Oral QHS    Or     pantoprazole  40 mg Intravenous QHS     piperacillin-tazobactam (ZOSYN) IV  3.375 g Intravenous Q12H     polyethylene glycol  17 g Oral DAILY     rOPINIRole  2 mg Enteral Tube QHS     senna-docusate  1 tablet Oral BID    Or     senna (SENOKOT) syrup  8.8 mg Enteral Tube BID     sodium chloride  10-30 mL Intravenous Q8H FIXED TIMES     sodium chloride  3 mL Intravenous Line Care     sodium polystyrene sulfonate  30 g Enteral Tube Once     vecuronium (NORCURON) syringe 1 mg/1 mL  0.1 mg/kg Intravenous Once     viscous lidocaine HC  15 mL Oral Once     white petrolatum-mineral oil   Both Eyes Q8H     Continuous Infusions:    dexmedetomidine 400 mcg/100 mL in NS (PRECEDEX) (4mcg/mL) Stopped (07/11/17 2120)     fentaNYL 1500 mcg/150 mL in NS (10 mcg/ml) 75 mcg/hr (07/14/17 0800)     insulin regular infusion 0.5 unit/mL       midazolam (VERSED) infusion 4 mg/hr (07/14/17 0459)     nacl 0.9% 25 mL/hr (07/12/17 0928)     PRN Meds:.acetaminophen, albuterol, aluminum-magnesium hydroxide-simethicone, bacitracin, benzocaine-menthol, bisacodyl, dextrose 50 % (D50W), fentaNYL 1500 mcg/150 mL in NS (10 mcg/ml), fentaNYL pf, glucagon (human recombinant), HYDROmorphone, insulin regular infusion 0.5 unit/mL, lidocaine, lipase-protease-amylase **AND** sodium bicarbonate, loratadine, LORazepam, metoclopramide **OR** metoclopramide **OR** metoclopramide, naloxone **OR** naloxone, ondansetron **OR** ondansetron, oxyCODONE, polyvinyl alcohol, sodium chloride, sodium chloride, sodium chloride, sodium phosphates 133 mL      Labs personally reviewed today during this evaluation at 9:57 AM

## 2021-06-11 NOTE — PROGRESS NOTES
Critical Care Medicine           7/12/20178:43 AM    Patient seen on morning rounds.      pt intubated, lightly sedated  Impulsive behavior, grabbing at support tubes/lines.   Bilateral soft wrist restraints in place.   Ongoing reassessment for restraint need.   Will d/c restraints when able to safely maintain support tubes in place with verbal redirection.    Soumya Vigil, CNP

## 2021-06-11 NOTE — PROGRESS NOTES
"Pt is intubated with 7.0 ETT secured at 21 at the teeth. BBS  Equal and clear. Pt failed wean this afternoon. Pt's Ventilator was changed this evening due to continual low tidal volume readings. Vital stable during the event. Nebulizer treatments given as scheduled. Sputum was sent. RT will continue to monitor.    Vent Mode: VCV  FiO2 (%):  [35 %-100 %] 45 %  S RR:  [16-18] 18  S VT:  [450 mL] 450 mL  PEEP/CPAP (cm H2O):  [5 cm H2O] 5 cm H2O  Minute Ventilation (L/min):  [4.7 L/min-8.3 L/min] 8.3 L/min  PIP:  [11 cm H2O-30 cm H2O] 25 cm H2O  IN SUP:  [10 cm H20] 10 cm H20  MAP (cm H2O):  [5-10] 9     /64  Pulse 69  Temp 98  F (36.7  C)  Resp 18  Ht 5' 6\" (1.676 m)  Wt 149 lb 1.6 oz (67.6 kg)  SpO2 100%  BMI 24.07 kg/m2     JADEN BoudreauxT  "

## 2021-06-11 NOTE — PROGRESS NOTES
"  RESPIRATORY CARE NOTE     Patient Name: Jhoana Ramirez  Today's Date: 7/11/2017        /68  Pulse 70  Temp 97.6  F (36.4  C) (Oral)   Resp (!) 39  Ht 5' 6\" (1.676 m)  Wt 149 lb 1.6 oz (67.6 kg)  SpO2 96%  BMI 24.07 kg/m2    Arterial Blood Gas result:  pH 7.29; PaCO2 42; PaO2 67 HCO3 20.2, base excess -6 on vent settings below.    Vent Mode: VCV  FiO2 (%):  [30 %-60 %] 40 %  S RR:  [15-16] 16  S VT:  [350 mL-450 mL] 450 mL  PEEP/CPAP (cm H2O):  [3 cm H2O-5 cm H2O] 5 cm H2O  Minute Ventilation (L/min):  [6.2 L/min-7.8 L/min] 7.8 L/min  PIP:  [26 cm H2O-36 cm H2O] 28 cm H2O  MAP (cm H2O):  [8-9] 9  7.o ETT 21 @ teeth     Pt remains on vent.  Breath sounds clear.  Ppeak 28. Pplat 12.       Aguila Iglesias, LRT      "

## 2021-06-11 NOTE — ANESTHESIA CARE TRANSFER NOTE
Last vitals:   Vitals:    07/10/17 1544   BP: 105/52   Pulse: 78   Resp: 16   Temp: 37.2  C (98.9  F)   SpO2: 95%     Patient's level of consciousness is drowsy  Spontaneous respirations: yes  Maintains airway independently: yes  Dentition unchanged: yes  Oropharynx: oropharynx clear of all foreign objects    QCDR Measures:  ASA# 20 - Surgical Safety Checklist: ASA20A - Safety Checks Done  PQRS# 430 - Adult PONV Prevention: 4558F - Pt received => 2 anti-emetic agents (different classes) preop & intraop  ASA# 8 - Peds PONV Prevention: NA - Not pediatric patient, not GA or 2 or more risk factors NOT present  PQRS# 424 - Nithya-op Temp Management: 4559F - At least one body temp DOCUMENTED => 35.5C or 95.9F within required timeframe  PQRS# 426 - PACU Transfer Protocol: - Transfer of care checklist used  ASA# 14 - Acute Post-op Pain: ASA14B - Patient did NOT experience pain >= 7 out of 10   Breathing spontaneously with adequate tidal volume, patient opens eyes to name, oral pharynx suctioned, and ETT removed with good air exchange.

## 2021-06-11 NOTE — PROGRESS NOTES
PT note: due to medical status will discontinue PT orders.  Please reorder when pt medical status improves and pt able to participate in PT. Thank you, PT

## 2021-06-11 NOTE — PROGRESS NOTES
"Pulmonology ICU Daily Progress Note    Assessment & Plan: Jhoana Ramirez is a 83 y.o. female with a past medical history of meningioma, CAD, CKD3 with single kidney, COPD on home O2, HTN, and anemia admitted tjo ICU fr acute hypoxic respiratory failure post-surgery. She had a L trochanteric fracture that was pinned 7/10/2017.     Subjective 7/15: Overnight patient failed weaning again.  Thinks underlying COPD is probably worse than suspected continues to have a left lower lobe infiltrate after aspiration event cultures are negative to date will finish a 5 day course  of antibiotics patient is starting to wake up moves all extremities    7/16 patient has done well on pressure support and CPAP we have extubate her and fortunately she is having some struggles will diurese her she is up for 5 L at this point in time she continues to have atelectasis at the bases as well    1. Acute postoperative respiratory failure most likely secondary to combination of being fluid overloaded which is now resolved as well as COPD and left lower lobe pneumonia.-   Continue Zosyn for aspiration pneumonia  Continue steroids and bronchodilators  Chest x-ray today should continue to show left lower lobe infiltrate does not appear to be fluid overloaded lines and tubes are all in good position  Will attempt weaning again today in the morning and afternoon she is failed last several days secondary to increased respiratory rate and decreased tidal volumes discussed with respiratory therapy  She was extubated but now is requiring BiPAP will diurese this patient is up over 4 L  Repeat ABG in an hour and a half  ;   2. Aspiration pneumonia - Day 4/5 Zosyn;     3. Urinary tract infection -with Klebsiella and E. coli will repeat UA today is on antibiotics at this point in time     4. Pleural effusions  - clinically \"wet\"; kidney cannot tolerate Lasix;  5. Acute on chronic kidney failure - clinically fluid overloaded, initially at this point in " time appears to be euvolemic appears to do better with better blood pressure is is getting albumin as far as antihypertensives will defer to nephrology at this point in time   Lasix, Lasix DC'ed; BMP in am   6. Anemia - Hgb drop; DC heparin; is not tolerating heparin so remains off that has SCDs for DVT prophylaxis    7. Trochanteric hip fracture, status post surgical repair 7/10/2017 - orthopedics following site looks okay at this point in time    8. History of seizure disorder - Keppra    #9 nutrition is getting tube feedings    10 DVT and GI prophylaxis SCDs and Protonix  Every time we have tried to start heparin or hemoglobin is started to drop so we have held that at this point in time    11 continues to need Milner for urine output monitoring      #12 meningioma noted on head CT scan this week seen by neurosurgery findings are unchanged since last CT scan over a year ago was evaluated by neurosurgery at that point in time patient did not want intervention no further intervention indicated at this point in time  Principal Problem:    Hip fracture  Active Problems:    Benign essential hypertension    Moderate COPD (chronic obstructive pulmonary disease)    Macrocytic anemia    Acute postoperative respiratory failure    Respiratory failure, post-operative    Acute on chronic kidney failure    Pneumonia of right lower lobe due to infectious organism    UTI (urinary tract infection)    Single kidney    Meningioma    Hip fracture, left, closed, with routine healing, subsequent encounter    Diet: OG renal  Fluids: normal saline 25 mL/hr   VTE Prophylaxis: hose and foot pumps    Status: inpatient  Length of Stay: 7     Wellington Viveros MD   Pulmonary and critical care medicine     Critical care time today is 40 minutes    Objective  Vital signs in last 24 hours  Temp:  [98.3  F (36.8  C)-100  F (37.8  C)] 98.6  F (37  C)  Heart Rate:  [] 98  Resp:  [18-32] 19  BP: (144-197)/(67-94) 191/88  FiO2 (%):  [30 %] 30  %  160 lb 1.6 oz (72.6 kg)  Physical Exam  General appearance: Patient appears older than her stated age.  She has an ET tube and NG tube in place she is not using any accessory muscles and pressure support and CPAP prior to extubation  HEENT normocephalic atraumatic with ET tube NG tube in place   Neck;: No elevated neck veins bruits or thyromegaly  Chest wall and back no crepitus  Lungs diminished breath sounds with some faint crackles at the bases  Heart rate and rhythm are regular.  There are no murmurs rubs or gallops  Abdomen is soft there is no rebound guarding flank or periumbilical ecchymosis she has bowel sounds  Extremities reveal no edema she has SCDs in lower extremities  Neuro remains sedated on ventilator when sedation is lightened up she is moving all extremities and following commands  skin: Skin color, texture, turgor normal. No rashes or lesions   2+ distal pulses throughout both upper and lower extremities  Hip remains dressed at this point in time    Intake/Output last 3 shifts  I/O last 3 completed shifts:  In: 2147 [I.V.:895; NG/GT:752; IV Piggyback:500]  Out: 4975 [Urine:4225; Stool:750]  Pertinent Labs     Results from last 7 days  Lab Units 07/16/17  0540 07/15/17  0514 07/14/17  1137 07/14/17  0307   LN-WHITE BLOOD CELL COUNT thou/uL 7.6 5.6  --  7.1   LN-HEMOGLOBIN g/dL 9.2* 9.0* 8.4* 8.4*   LN-HEMATOCRIT % 27.9* 27.3*  --  26.3*   LN-PLATELET COUNT thou/uL 103* 101*  --  103*       Results from last 7 days  Lab Units 07/16/17  0540 07/15/17  0514 07/14/17  0307   LN-SODIUM mmol/L 149* 146* 143  143   LN-POTASSIUM mmol/L 3.8 3.8 4.1  4.1   LN-CHLORIDE mmol/L 107 107 107  107   LN-CO2 mmol/L 29 28 25  25   LN-BLOOD UREA NITROGEN mg/dL 89* 90* 76*  76*   LN-CREATININE mg/dL 2.32* 2.94* 3.32*  3.32*   LN-CALCIUM mg/dL 8.7 8.4* 7.8*  7.8*   LN-ALBUMIN g/dL 3.9 3.5 2.7*  2.7*   LN-PROTEIN TOTAL g/dL 5.7* 5.4* 4.8*   LN-BILIRUBIN TOTAL mg/dL 0.8 0.6 0.3   LN-ALKALINE PHOSPHATASE U/L 49  52 53   LN-ALT (SGPT) U/L <6 <6 <6   LN-AST (SGOT) U/L 16 14 15       Results from last 7 days  Lab Units 07/16/17  0540 07/15/17  0514 07/14/17  0307  07/09/17  1440   LN-INR  1.14* 1.13* 1.14*  < > 1.01   LN-PARTIAL THROMBOPLASTIN TIME seconds  --   --   --   --  39*   < > = values in this interval not displayed.  Pertinent Radiology     Current Medications.     acetaminophen  1,000 mg Enteral Tube TID    Or     acetaminophen  650 mg Rectal TID     chlorhexidine  15 mL Topical Q12H     furosemide  60 mg Intravenous BID - diuretic     gabapentin  100 mg Enteral Tube QHS     insulin aspart (NovoLOG) injection   Subcutaneous Q6H     ipratropium-albuterol  3 mL Nebulization QID - RT     levETIRAcetam  (KEPPRA) solution 100 mg/mL  500 mg Enteral Tube BID    Or     levETIRAcetam (KEPPRA) IVPB  500 mg Intravenous BID     methylPREDNISolone sodium succinate  40 mg Intravenous Q6H     omeprazole  20 mg Enteral Tube QHS    Or     omeprazole  20 mg Oral QHS    Or     pantoprazole  40 mg Intravenous QHS     piperacillin-tazobactam (ZOSYN) IV  3.375 g Intravenous Q12H     polyethylene glycol  17 g Oral DAILY     rOPINIRole  2 mg Enteral Tube QHS     senna-docusate  1 tablet Oral BID    Or     senna (SENOKOT) syrup  8.8 mg Enteral Tube BID     sodium chloride  10-30 mL Intravenous Q8H FIXED TIMES     sodium chloride  3 mL Intravenous Line Care     white petrolatum-mineral oil   Both Eyes Q8H     PRN:acetaminophen, albuterol, aluminum-magnesium hydroxide-simethicone, bacitracin, benzocaine-menthol, bisacodyl, dextrose 50 % (D50W), fentaNYL 1500 mcg/150 mL in NS (10 mcg/ml), fentaNYL pf, glucagon (human recombinant), hydrALAZINE, HYDROmorphone, insulin regular infusion 0.5 unit/mL, lidocaine, lipase-protease-amylase **AND** sodium bicarbonate, loratadine, LORazepam, metoclopramide **OR** metoclopramide **OR** metoclopramide, naloxone **OR** naloxone, ondansetron **OR** ondansetron, oxyCODONE, polyvinyl alcohol, sodium chloride,  sodium chloride, sodium chloride, sodium phosphates 133 mL    Chest x-ray 7/15 as noted above

## 2021-06-11 NOTE — PROGRESS NOTES
[facility]:    Referred by CM to screen for potential LTACH needs.  At this time, patient is too acute to determine appropriateness for [place].  Will continue review and discuss with CM.  Thank you for this referral.     Jennifer Carlson RN   [phone]

## 2021-06-11 NOTE — PROGRESS NOTES
[facility] Daily Progress Note    Assessment & Plan: Jhoana Ramirez is a 83 y.o. female with a past medical history of tobacco dependence, COPD, hypertension, and macrocytic anemia who was admitted secondary to a fall yesterday and found to have a left hip fracture.    Principal Problem:    Hip fracture  Active Problems:    Benign essential hypertension    Moderate COPD (chronic obstructive pulmonary disease)    Macrocytic anemia    Acute postoperative respiratory failure    Respiratory failure, post-operative    Acute on chronic kidney failure    Pneumonia of right lower lobe due to infectious organism    UTI (urinary tract infection)    Single kidney    Meningioma    Hip fracture, left, closed, with routine healing, subsequent encounter    Acute post-op respiratory failure:  Had to be intubated multiple times on 7/10 following surgery for her hip, received 2 doses phenylephrine.  Patient was extubated for the second time and could not protect her airway, with acidotic ABGs and decision was made to intubate again and transfer to ICU.    -ICU team continues to manage, respiratory failure most likely secondary to underlying COPD and oversedation.  Repeat CT head on 7/12 did not show new infarct or hemorrhage, and the patient has moments where she is more alert, making status epilepticus less likely.  Pleural effusions were initially noticed bilaterally on chest x-ray, so Lasix was started and continued in the ICU.  Patient's creatinine continued to trend upward and clinically patient was deemed to be euvolemic, so the Lasix was discontinued.  Thought to possibly be an ATN picture, given worsening creatinine and unilateral kidney.  - extubated now, off of sedation.  Doing well on nasal cannula during the day and with CPAP at night.  - over 5 L fluid out in last 24 hours.  - has required Tylenol and Dilaudid overnight for pain.  - Novolog SSI for blood glucose control; blood sugars likely exacerbated  by IV Methylpred  - Protonix for PPI prophylaxis.  - continue on Zosyn for empiric pneumonia coverage, given on ventilator, with concern for aspiration, and worsening chest x-ray.  - Ropinirole 2 mg.  - Methylprednisolone IV q 6 hrs.  - IVF bolus if continued low pressures; goal MAP > 65.  .  - Hgb up to 10.0 today.  -Repeat EKG on 7/14 showed resolution of the ST elevation, and the troponin that was drawn was negative.     - NSR with occasional PACs.  However patient, in the last overnight shift, not in A. fib.  Will continue to monitor.      Left trochanteric hip fracture, in setting of unwitnessed fall:        - IM nailing procedure on 7/11.  Tylenol and Fentanyl for pain.  - initial laboratory workup for possible causes of fall have been unrevealing.  Urine culture grew E. Coli and Klebsiella; repeat urinalysis (-) for nitrites and LE.  - there could possibly be a component of this new-onset peripheral edema causing unsteady gate.  - BNP mildly elevated on presentation; most recent on 7/12 at 191.  - last Echo in 2016 showing LVEF of 65% with mild pulmonary hypertension.  - Echo repeated here in hospital with LVEF of 60 % with mild to moderate RV enlargement/mildly decreased right ventricular systolic function.   - No longer receiving Lasix.  Ortho following.    COPD: on 2 L/min of home O2 per nasal cannula 24 hours per day.  - uses Symbicort 2 puffs BID and Albuterol inhaler PRN at home.     Hypertension: On Norvasc 10 mg daily at home.    -now hypertensive after extubation.  Can restart Amlodipine.    Macrocytic Anemia: appears to be chronic in nature, as her most recent Hemoglobin readings here in the hospital over the last year range from the 8s to low 10s.  There could have initially been a component of acute blood loss with hip fracture, but at this point appears to be mostly related to her chronic anemia.  - peripheral smear showing a macrocytosis and thrombocytopenia.  - If patient is symptomatic or Hgb  "drops below 7.0, can transfuse 1 unit pRBCs.  - being supplemented with iron sucrose through the IV.  - Hemoglobin up to 10.0 on 7/17.  - Nephrology following and thinking related to CKD.    Meningioma:  Reportedly taking Keppra but had told admitting provider that she used them \"as needed\".  Most likely on the Keppra due to chronic meningioma.   - Repeat CT head showing meningioma and mass effect, likely unchanged from MRI.  - Neurosurgery consulted; patient hospitalized for this meningioma in 2016 and family decided on non-operative approach.  - patient now DNR/DNI after discussion with sons.     Diet: Sips of liquid   Fluids: NS 25 ml/hr.   VTE Prophylaxis: Heparin SQ injections.    Discharge Planning discussed with [facility].  Barriers to discharge:  Medical - pending extubation and recovery from surgery.  Anticipated discharge day: TBD.  Disposition:  acute rehab  Status: inpatient admit  Length of Stay: 8     Patient discussed with attending [facility] Medicine physician, Dr. Swapnil Armenta, who agrees with the plan.     Manuel Levy PGY1 7/17/2017  [facility] Medicine   Pager: [phone] (from 8AM-5:30PM)    This is a [facility] Patient.  Please call the senior pager with any questions or concerns, 24/7.  [phone]    Subjective/Interval events:  Extubated yesterday, tolerated well with CPAP on.  Was conversant with sons in the room in the afternoon.  Overnight, continued to improved and was transitioned to nasal cannula for oxygen support, per nursing staff was conversant and did well with nasal cannula for 4 hours.  Was transitioned to BiPap for the evening.  This morning, is conversant, oriented to time and place, wants to get up and move around.  Taking sips of water, passed initial swallow eval.  Now with elevated blood pressures.      Objective  Vital signs in last 24 hours  Temp:  [97.1  F (36.2  C)-98.9  F (37.2  C)] 97.1  F (36.2  C)  Heart " Rate:  [] 84  Resp:  [17-36] 20  BP: (153-200)/() 188/85  FiO2 (%):  [25 %-30 %] 30 %  146 lb 6.4 oz (66.4 kg)     Physical Exam  General:  Alert, oriented to time and place, appears uncomfortable but is conversant.  Wants to turn to either side and wants to get up.    Respiratory:  Lung sounds are clear bilaterally in the anterior lung fields; I do not appreciate any wheezing or crackles.      Cardiovascular:  Heart regular rate and rhythm with occasional skipped beats.  I do not appreciate any clicks, murmurs, or rubs.  Abdomen:  Non-tender, non-distended.  Bowel sounds are present.  Extremities:  No lower extremity edema bilaterally.    Intake/Output last 3 shifts  I/O last 3 completed shifts:  In: 1667 [I.V.:1667]  Out: 5350 [Urine:5050; Stool:300]     Pertinent Labs     Results from last 7 days  Lab Units 07/17/17  0422 07/16/17  0540 07/15/17  0514   LN-WHITE BLOOD CELL COUNT thou/uL 9.2 7.6 5.6   LN-HEMOGLOBIN g/dL 10.0* 9.2* 9.0*   LN-HEMATOCRIT % 29.7* 27.9* 27.3*   LN-PLATELET COUNT thou/uL 121* 103* 101*       Results from last 7 days  Lab Units 07/17/17  0422 07/16/17  0540 07/15/17  0514   LN-SODIUM mmol/L 144  144 149* 146*   LN-POTASSIUM mmol/L 3.9  3.9 3.8 3.8   LN-CHLORIDE mmol/L 97*  97* 107 107   LN-CO2 mmol/L 34*  34* 29 28   LN-BLOOD UREA NITROGEN mg/dL 96*  96* 89* 90*   LN-CREATININE mg/dL 2.18*  2.18* 2.32* 2.94*   LN-CALCIUM mg/dL 9.0  9.0 8.7 8.4*   LN-ALBUMIN g/dL 3.8  3.8 3.9 3.5   LN-PROTEIN TOTAL g/dL 5.7* 5.7* 5.4*   LN-BILIRUBIN TOTAL mg/dL 1.1* 0.8 0.6   LN-ALKALINE PHOSPHATASE U/L 46 49 52   LN-ALT (SGPT) U/L 7 <6 <6   LN-AST (SGOT) U/L 16 16 14       Results from last 7 days  Lab Units 07/17/17  0422 07/16/17  0540 07/15/17  0514   LN-INR  1.14* 1.14* 1.13*        -repeat Urinalysis on 7/13 (-) for nitrites and leukocyte esterase.  Previous urine culture from admission day had grown both E. Coli and Klebsiella.    7/16-7/17:    Pertinent Radiology    CT Head on  7/12--no ischemia or hemorrhage.  Vasogenic edema and meningioma in left frontal lobe unchanged in size from MRI in January 2016.    Current Medications.     acetaminophen  1,000 mg Oral TID    Or     acetaminophen  650 mg Rectal TID     chlorhexidine  15 mL Topical Q12H     furosemide  60 mg Intravenous BID - diuretic     gabapentin  100 mg Oral QHS     insulin aspart (NovoLOG) injection   Subcutaneous Q6H     ipratropium-albuterol  3 mL Nebulization QID - RT     levETIRAcetam  500 mg Oral BID    Or     levETIRAcetam (KEPPRA) IVPB  500 mg Intravenous BID     methylPREDNISolone sodium succinate  40 mg Intravenous Q6H     omeprazole  20 mg Enteral Tube QHS    Or     omeprazole  20 mg Oral QHS    Or     pantoprazole  40 mg Intravenous QHS     polyethylene glycol  17 g Oral DAILY     rOPINIRole  2 mg Oral QHS     senna-docusate  1 tablet Oral BID    Or     senna (SENOKOT) syrup  8.8 mg Enteral Tube BID     sodium chloride  10-30 mL Intravenous Q8H FIXED TIMES     sodium chloride  3 mL Intravenous Line Care     white petrolatum-mineral oil   Both Eyes Q8H     PRN:acetaminophen, albuterol, aluminum-magnesium hydroxide-simethicone, bacitracin, benzocaine-menthol, bisacodyl, dextrose 50 % (D50W), fentaNYL 1500 mcg/150 mL in NS (10 mcg/ml), fentaNYL pf, glucagon (human recombinant), hydrALAZINE, HYDROmorphone, insulin regular infusion 0.5 unit/mL, labetalol, lidocaine, lipase-protease-amylase **AND** sodium bicarbonate, loratadine, LORazepam, metoclopramide **OR** metoclopramide **OR** metoclopramide, naloxone **OR** naloxone, ondansetron **OR** ondansetron, oxyCODONE, polyvinyl alcohol, sodium chloride, sodium chloride, sodium chloride, sodium phosphates 133 mL    This note was created with help of Dragon dictation system. Grammatical /typing errors are not intentional.   7/18/2017  [facility] Faculty Attestation  I have seen and examined the patient.7/1717  I have discussed the case with the resident physician(s), Dr. Mildred LICONA  agree with the findings, assessment and plan.    Swapnil Armenta MD

## 2021-06-11 NOTE — PROGRESS NOTES
Respiratory Therapy Note:  Pt extubated to 3 LPM NC.  Following commands, cuff leak present.  SpO2 100%.  BS decreased with end expiratory wheeze.  Duo neb treatment given.  JADEN SierraT, 7/16/2017    Addendum at ~ 1230:  Pt having increased work of breathing.  Placed on BiPAP 12/4 14 30% per MD Viveros.  RR 24 Vt 524 MV 13 SpO2 100%.  Plan is to continue BiPAP and redraw ABG at 1400.  RADHA Sierra, 7/16/2017

## 2021-06-11 NOTE — PROGRESS NOTES
Ortho Progress Note      Post-operative Day: 1 Day Post-Op  S/P INTERNAL FIXATION, LEFT FRACTURE, HIP, WITH TFN PINS   Subjective:    Pt vented, sleeping. Appears comfortable, occasionally moves both feet.     Objective:  Vitals:    07/11/17 1230   BP: 137/61   Pulse: (!) 54   Resp: 21   Temp:    SpO2: 100%     Gen: Sleeping, vented.  Wound status: Post op dressings in place, mild bloody drainage.   Circulation, motion and sensation: Toes warm and well perfused. Dorsalis pedis pulses present.   Swelling: Mild   Calf tenderness: calves are soft bilaterally     Pertinent Labs   Lab Results: personally reviewed.   Lab Results   Component Value Date    INR 1.24 (H) 07/11/2017    INR 1.01 07/09/2017    INR 1.09 01/30/2016     Lab Results   Component Value Date    WBC 3.3 (L) 07/11/2017    HGB 7.3 (L) 07/11/2017    HCT 22.9 (L) 07/11/2017     (H) 07/11/2017     (L) 07/11/2017     Lab Results   Component Value Date     07/11/2017    K 5.6 (H) 07/11/2017     (H) 07/11/2017    CO2 18 (L) 07/11/2017       Assessment: s/p left hip IM nailing    Plan:   PT/OT when appropriate.  Weightbearing status:WBAT  Anticoagulation: Currently on SQ heparin, transition to  BID only when extubated  in addition to SCDs, fred stockings and early ambulation.  Discharge planning: Will likely need TCU cares on discharge.     Report completed by:  Pancho Mckay PA-C  Date: 7/11/2017  Time: 1:18 PM

## 2021-06-11 NOTE — PROGRESS NOTES
"  Clinical Nutrition Therapy Reassessment Note      Reason for Assessment:   Jhoana Ramirez is a 83 y.o. female assessed by the registered Dietitian for follow up.     Kept visit brief, pt stated \"everything is horse sh*t, my appetite is good.\"     Current Nutrition Prescription:   Diet: Regular, thin liquids (pt was on NDD3 with nectar thick liquids, but was drinking thin anyway)    Current Nutrition Intake:  No recent po intake documented. Pt reports good appetite.     Anthropometrics:  Height: 5' 6\" (167.6 cm)  Admission weight:  Weight: 146 lb 6.4 oz (66.4 kg)  Wt Readings from Last 10 Encounters:   07/17/17 146 lb 6.4 oz (66.4 kg)   07/11/16 163 lb (73.9 kg)   02/22/16 144 lb 12 oz (65.7 kg)   02/18/16 144 lb 12 oz (65.7 kg)   02/10/16 151 lb 12 oz (68.8 kg)   02/03/16 164 lb 7.4 oz (74.6 kg)     GI Status/Output:   The patient's GI symptoms include: WDL per nursing   BM 7/17/17  Bowel Sounds present    Skin/Wound:  Shaheen score Shaheen Scale Score: 18    Medications:  Medications reviewed.  Prednisone    Labs:  Labs reviewed  -166     Malnutrition: not noted   Nutrition Risk Level: stable-moderate risk  Nutrition dx: no nutrition diagnosis at this time   Goal: po intake >50% of meals   Intervention: encourage po intake   Monitoring: po intake, weight     See Care Plan for Problems, Goals, and Interventions.  "

## 2021-06-11 NOTE — PROGRESS NOTES
"RENAL PROGRESS NOTE     CC: FRANK    ROS: No new issues present. Intubated, ventilated, she is now off sedation on undergoing ventilatory weaning, she is very restless, transfuse, she is not able to participate in a review of systems.  She is hypertensive now due to discomfort associated to endotracheal tube.    Assessment and Plan:    1. Acute kidney injury. Likely heomdynamic shifts intraoperatively or shortly after. She has significant risk factors for ATN, a unilateral kidney, vascular disease with smoking history and underlying advanced CKD.  Very good urine output, creatinine coming down.  She did not require dialysis.  Continue to monitor.  2. CKD stage 4: Baseline creatinine of 2 mg/dL, unilateral kidney. Was removed decades ago, reasons unclear.  3. Hypertension.  Status post hypotension now resolved.  She is now off oral medications for hypertension, she is receiving hydralazine 10 mg IV every 4 hours  as needed.  Will need to resume oral medications as on as patient is able to take them.  4. Acute Hypernatremia.  Calculated free water deficit 2.3 liters, begin D5W at 75 ml/h  5. Anemia: Secondary to CKD, trend. Venofer course due to iron deficiency.   6. Hyperkalema: Normalized after high dose diuretics, hold for now and trend.  7. Acute Respiratory failure: On ventilatory support. Significant underlying COPD due to tobacco use. On chronic oxygen at home.  Ongoing ventilatory weaning at present.  Continue as per pulmonary/ICU service.    Care discussed in detail with Sarah GRAFF in charge of the patient      Yoav Dave MD FACP FASN  Nephrology    PHYSICAL EXAM  Vitals:    07/16/17 1010   BP: (!) 191/88   Pulse: 94   Resp: 19   Temp:    SpO2: 100%     BP (!) 191/88  Pulse 94  Temp 98.6  F (37  C) (Oral)   Resp 19  Ht 5' 6\" (1.676 m)  Wt 160 lb 1.6 oz (72.6 kg)  SpO2 100%  BMI 25.84 kg/m2      Intake/Output Summary (Last 24 hours) at 07/16/17 1101  Last data filed at 07/16/17 0553   Gross per 24 " hour   Intake             2147 ml   Output             4325 ml   Net            -2178 ml     Vent Mode: CPAP/PSV  FiO2 (%):  [30 %] 30 %  S RR:  [18] 18  S VT:  [450 mL] 450 mL  PEEP/CPAP (cm H2O):  [5 cm H2O] 5 cm H2O  Minute Ventilation (L/min):  [8.7 L/min-14.4 L/min] 13.4 L/min  PIP:  [17 cm H2O-39 cm H2O] 18 cm H2O  NJ SUP:  [5 cm H20] 5 cm H20  MAP (cm H2O):  [7-14] 8  Wt Readings from Last 3 Encounters:   07/15/17 160 lb 1.6 oz (72.6 kg)   07/11/16 163 lb (73.9 kg)   02/22/16 144 lb 12 oz (65.7 kg)       GENERAL: Chronically ill and debilitated.  Platelets at present  HEAD: Normal  HEENT: ETT in place. No eyelid edema.  CARDIOVASCULAR: Unable to assess JVP, peripheral dependent edema.   PULMONARY: No respiratory distress while on ventilatory support. No cyanosis  GASTROINTESTINAL: No ascites present  MSK: Diffuse muscle wasting, no joint deformities  NEURO: Restless at present and off sedation during ongoing ventilatory weaning  PSYCHIATRIC: Remains restless  SKIN: Pale, no jaundice, no rash.  : Milner catheter in place, clear urine in the Milner bag.    LABORATORIES    Results from last 7 days  Lab Units 07/16/17  0540 07/15/17  0514 07/14/17  1137 07/14/17  0307   LN-WHITE BLOOD CELL COUNT thou/uL 7.6 5.6  --  7.1   LN-HEMOGLOBIN g/dL 9.2* 9.0* 8.4* 8.4*   LN-HEMATOCRIT % 27.9* 27.3*  --  26.3*   LN-PLATELET COUNT thou/uL 103* 101*  --  103*       Results from last 7 days  Lab Units 07/16/17  0540 07/15/17  0514 07/14/17  0307   LN-SODIUM mmol/L 149* 146* 143  143   LN-POTASSIUM mmol/L 3.8 3.8 4.1  4.1   LN-CHLORIDE mmol/L 107 107 107  107   LN-CO2 mmol/L 29 28 25  25   LN-BLOOD UREA NITROGEN mg/dL 89* 90* 76*  76*   LN-CREATININE mg/dL 2.32* 2.94* 3.32*  3.32*   LN-CALCIUM mg/dL 8.7 8.4* 7.8*  7.8*   LN-PROTEIN TOTAL g/dL 5.7* 5.4* 4.8*   LN-BILIRUBIN TOTAL mg/dL 0.8 0.6 0.3   LN-ALKALINE PHOSPHATASE U/L 49 52 53   LN-ALT (SGPT) U/L <6 <6 <6   LN-AST (SGOT) U/L 16 14 15       Results from last 7  days  Lab Units 07/16/17  0540  07/09/17  1440   LN-INR  1.14*  < > 1.01   LN-PARTIAL THROMBOPLASTIN TIME seconds  --   --  39*   < > = values in this interval not displayed.    Results from last 7 days  Lab Units 07/10/17  1321   LN-ABORH  O POS       Results from last 7 days  Lab Units 07/15/17  0514   LN-MAGNESIUM mg/dL 2.1       RADIOLOGY REPORTS    ECHOCARDIOGRAM    MEDICATIONS    acetaminophen  1,000 mg Enteral Tube TID    Or     acetaminophen  650 mg Rectal TID     chlorhexidine  15 mL Topical Q12H     gabapentin  100 mg Enteral Tube QHS     insulin aspart (NovoLOG) injection   Subcutaneous Q6H     ipratropium-albuterol  3 mL Nebulization QID - RT     levETIRAcetam  (KEPPRA) solution 100 mg/mL  500 mg Enteral Tube BID    Or     levETIRAcetam (KEPPRA) IVPB  500 mg Intravenous BID     methylPREDNISolone sodium succinate  40 mg Intravenous Q6H     omeprazole  20 mg Enteral Tube QHS    Or     omeprazole  20 mg Oral QHS    Or     pantoprazole  40 mg Intravenous QHS     piperacillin-tazobactam (ZOSYN) IV  3.375 g Intravenous Q12H     polyethylene glycol  17 g Oral DAILY     rOPINIRole  2 mg Enteral Tube QHS     senna-docusate  1 tablet Oral BID    Or     senna (SENOKOT) syrup  8.8 mg Enteral Tube BID     sodium chloride  10-30 mL Intravenous Q8H FIXED TIMES     sodium chloride  3 mL Intravenous Line Care     vecuronium (NORCURON) syringe 1 mg/1 mL  0.1 mg/kg Intravenous Once     white petrolatum-mineral oil   Both Eyes Q8H     acetaminophen, albuterol, aluminum-magnesium hydroxide-simethicone, bacitracin, benzocaine-menthol, bisacodyl, dextrose 50 % (D50W), fentaNYL 1500 mcg/150 mL in NS (10 mcg/ml), fentaNYL pf, glucagon (human recombinant), hydrALAZINE, HYDROmorphone, insulin regular infusion 0.5 unit/mL, lidocaine, lipase-protease-amylase **AND** sodium bicarbonate, loratadine, LORazepam, metoclopramide **OR** metoclopramide **OR** metoclopramide, naloxone **OR** naloxone, ondansetron **OR** ondansetron,  oxyCODONE, polyvinyl alcohol, sodium chloride, sodium chloride, sodium chloride, sodium phosphates 133 mL    fentaNYL 1500 mcg/150 mL in NS (10 mcg/ml) Stopped (07/16/17 1048)     insulin regular infusion 0.5 unit/mL       midazolam (VERSED) infusion 6 mg/hr (07/16/17 0532)     nacl 0.9% 25 mL/hr (07/12/17 0928)         Above laboratories and medications were personally reviewed during evaluation today.

## 2021-06-11 NOTE — PROGRESS NOTES
Clinical Nutrition Therapy    Pt had been on TEN of Replete at 70 ml/hour which provides 1680 calories, 105 grams protein, 189 grams CHO, 1411 ml free water, and 25 grams of fiber. Water flushes at 60 ml q 8 hours.  Pt has og    Per RN feeding is on hold due to weaning trials.  Labs reviewed  BM reported as liquid 7/15, rectal tube? Pt has had watery stool since 7/13  Weight 160# 7/15  Shaheen 12    Pt is at moderate nutrition risk,     No change in est needs. Doesn't meet criteria for malnutrition.    Intervention: no needs  for formula change due to K+ levels at this time. Follow for resumption of feeding or po if weans. If remains on TEN, may need to change formulas if GI tract/liquid stools remain.  Plan: Monitor plan of care, feeding orders.

## 2021-06-11 NOTE — PROGRESS NOTES
"Vent Mode: VCV  FiO2 (%):  [40 %-100 %] 40 %  S RR:  [18] 18  S VT:  [450 mL] 450 mL  PEEP/CPAP (cm H2O):  [5 cm H2O] 5 cm H2O  Minute Ventilation (L/min):  [4.1 L/min-9.2 L/min] 8.1 L/min  PIP:  [9 cm H2O-49 cm H2O] 39 cm H2O  OR SUP:  [5 cm H20] 5 cm H20  MAP (cm H2O):  [5-12] 10    Patient remains intubated and ventilated on above settings. Breath sounds clear. No change in respiratory status.     /71  Pulse 92  Temp 98.1  F (36.7  C) (Oral)   Resp 19  Ht 5' 6\" (1.676 m)  Wt 153 lb 9.6 oz (69.7 kg)  SpO2 100%  BMI 24.79 kg/m2    "

## 2021-06-11 NOTE — OP NOTE
Operative Note    Name:  Jhoana Ramirez  PCP:  Sergey Lopez MD  Procedure Date:  7/10/2017      INTERNAL FIXATION, LEFT FRACTURE, HIP, WITH TFN PINS  (Left)    Pre-Procedure Diagnosis:  Closed nondisplaced intertrochanteric fracture of left femur, initial encounter [S72.145A]    Post-Procedure Diagnosis:    Closed nondisplaced intertrochanteric fracture of left femur, initial encounter [S72.145A]    Procedure: cephallomedullary nailing of left intertrochanteric femur fracture     Surgeon(s):  Selwyn Graham MD    Physician Assistant: uHong Mathews PA-C  A PAC was necessary to ensure safety of this patient and adequate progression of the procedure.    Anesthesia Type:  general    Estimated Blood Loss:   * No blood loss documented between In Room and Out of Room log events - 7/10/2017  2:14 PM to 7/10/2017  3:39 PM *    Implants:   1. Synthes short TFNA 11 mm x 125 degree.  2. 100 mm x 10.5 mm lag screw.  3. 38 mm distal locking screw.    Complications:    None    Indications for procedure: Jhoana is a 83 year old female that fell at home yesterday and sustained a non-displaced intertroch fracture. The patient underwent medical optimization and was cleared for surgery by the hospitalist. I met with the patient today at the bedside and discussed the risks and benefits of moving forward, including but not limited to bleeding, infection, damage to blood vessels, nerves, tendons, malunion, nonunion, stroke, heart attack, blood clot and death.  I discussed the use of aspirin for one month after surgery. The patient elected to proceed. Informed consent was obtained.    Procedure:   The patient was identified in the preoperative holding area. The correct operative  site was marked with indelible ink. The patient was then brought to the operating room and general anesthesia was successfully induced by the anesthesiologist. The patient was then transferred to the fracture table and a well-padded perineal post was  placed between the legs. Pre-operative antibiotic of ancef 2 grams was administered before the procedure and induction of anesthesia. The foot was well-padded and placed into a traction boot. A reduction maneuver consisting of traction, adduction and internal rotation of the left lower extremity was provided. Intraoperative x-ray was then utilized to confirm proper reduction  on the AP and lateral x-ray planes. The entire femoral head was visible. The left hip was then prepped and draped in the usual sterile fashion.    Prior to the procedure, a timeout was performed to identify the correct  patient, operative site and any implants needed.    A 4 cm incision was then made centered proximally to the tip of the left  greater trochanter. Sharp dissection was carried down through skin and  subcutaneous tissue. The iliotibial band was identified and split in line  over the tip of the greater trochanter. The gluteus medius and tip of the  trochanter were easily palpable. A guidepin was then placed in the greater trochanter and advanced with a wire  past the level of the lesser trochanter. A standard large bore opening reamer was then used to open the proximal canal to the level of the lesser trochanter.      We then selected a 11 mm nail.  We then impacted a 11 mm x 125 degree nail  into position.  A small stab incision was then made laterally through the skin and using the targeting guide for the lag screw, the barrel was advanced down to the bone. A threaded pin was then advanced and found to be centered on both the AP and lateral planes, up the femoral neck, into the head. Our tip apex distance was then minimized for optimal placement. I then measured and reamed for a 100 mm lag screw and then a 10.5 mm x 100 mm lag screw, which turned into position. Care was taken not to penetrate the femoral head.      Attention was then turned to the distal locking screw. Using the attached aiming arm, a small stab incision  was made laterally through the  skin. A drill hole was then made and found to be centered. The depth was  then measured and a 38 mm locking screw was placed into the static  position. Final images showed good position of the implant and proper  reduction of the intertrochanteric region.    The wounds were then copiously irrigated. The deep fascia was closed with  interrupted 0 Vicryl. Skin was closed with buried 2-0 Vicryl followed by  staples and a sterile dressing. The patient was then awakened and transferred  to a regular bed. The patient was then taken from the operating room to the PACU  in stable condition. There were no apparent complications. All sponge and  needle counts were correct.    POSTOPERATIVE PLAN  1. Weightbearing as tolerated of the left lower extremity.  2. Dressing change in 2 to 3 days.    3. Multimodal VTE prophylaxis including aspirin 325 mg for 30 days, bilateral  ELEANOR hoses and foot pumps while in the hospital. Encourage early ambulation.    4. I will see the patient back in clinic in two weeks for repeat x-rays.        Selwyn Graham    Date: 7/10/2017  Time: 3:39 PM        Implant Name Type Inv. Item Serial No.  Lot No. LRB No. Used Action   NAIL FEMORAL TFN [id number] 125 DEG STL - [id number] - [id number] 76-FEMORAL NAIL NAIL FEMORAL TFN [id number] 125 DEG STL - [id number]  [facility] [id number] Left 1 Implanted   SCREW CANNULATED [id number] STL - [id number]  [id number] 80-CANNULATED SCREW SCREW CANNULATED [id number] STL - [id number]   [facility] [id number] Left 1 Implanted

## 2021-06-11 NOTE — CONSULTS
Nutrition Support     Date: 7/11/2017    Medical Problems:   Principal Problem:    Hip fracture  Active Problems:    Hypertension    Chronic Obstructive Pulmonary Disease    Macrocytic anemia    Pneumothorax on right    Acute postoperative respiratory failure    Respiratory failure, post-operative      RD consult: initiate and manage enteral feeding    Diet:   NPO  Intubated    Tube:  Tube: OG to be placed    Formula: Replete with Fiber  Initial rate: 20 mL/hr  Goal rate: 70 mL/hr    Calories: 1680 per day  Protein: 105 gm  Carbohydrate: 189 gm  Fiber: 25 gm  Free water: 1411 mL  (in formula)    Flush: 60 ml water q 8 hr     Risk of Aspiration:  Mechanical ventilation: yes  Nasal feeding tube: OG  Decreased alertness: sedated  HOB flat: no  > 70 yrs old: yes       Meds:  Precedex    Weight: 149 lb    Labs:   Glu 128  K 5.6    GI:  Bowel Sounds: present  Last Stool: 7/9    Plan/Intervention:  1. Initiate enteral feeding  2. Adjust formula as indicated by renal function    Goals:   1. Tolerate enteral feeding  2. Meet estimated needs        Kaitlynn Mcarthur RD

## 2021-06-11 NOTE — PROGRESS NOTES
Nutrition Support     Date: 7/13/2017    Medical Problems:   Principal Problem:    Hip fracture  Active Problems:    Benign essential hypertension    Moderate COPD (chronic obstructive pulmonary disease)    Macrocytic anemia    Acute postoperative respiratory failure    Respiratory failure, post-operative    Acute on chronic kidney failure    Pneumonia of right lower lobe due to infectious organism    UTI (urinary tract infection)    Single kidney      Diet:   NPO    TF:  Tube: OG    (placed  7/11)    Formula: Replete with Fiber  Current rate: 50 mL/hr  Goal rate: 70 mL/hr    Calories: 1680 per day  Protein: 105 gm  Carbohydrate: 189 gm  Fiber: 25 gm  Free water: 1411 mL  (in formula)    Flush: 60 ml water q 8 hr     Weight: 154 lb    Weight changes since admissions:  Per I&Os: increased 10.6 lb  Per documented weights: increased 5 lb    Labs:   Glu 149  BUN 56  Creat 2.77  K 4.0    GI:  Bowel Sounds: hypoactive  Last Stool: 7/9  Residuals: minimal    Malnutrition Assessment:    Enteral feeding will meet estimated needs soon    Plan/Intervention:  1. Continue advancing enteral feeding to goal rate  2. Change to renal formula if K climbs again    Goals:   1. Tolerate enteral feeding  2. Meet estimated needs        Kaitlynn Mcarthur RD

## 2021-06-11 NOTE — PROGRESS NOTES
Nutrition Support     Date: 7/12/2017    Medical Problems:   Principal Problem:    Hip fracture  Active Problems:    Benign essential hypertension    Moderate COPD (chronic obstructive pulmonary disease)    Macrocytic anemia    Acute postoperative respiratory failure    Respiratory failure, post-operative    Acute on chronic kidney failure    Pneumonia of right lower lobe due to infectious organism      Diet:   NPO    TF:  Tube: OG    (placed  7/11)    Formula: Replete with Fiber  Current rate: 30 mL/hr  Goal rate: 70 mL/hr    Calories: 1680 per day + 306 pascale in IVF with D5  Protein: 105 gm  Carbohydrate: 189 gm  Fiber: 25 gm  Free water: 1411 mL  (in formula)    Flush: 60 ml water q 8 hr        Weight: 153 lb    Weight changes since admissions:  Per I&Os: increased 10.4    Labs:   Glu 141  BUN 45  Creat 2.35    GI:  Bowel Sounds: hypoactive  Last Stool: 7/9    Plan/Intervention:  1. Continue current feeding  2. Advance as tolerated to goal rate  3. May need to adjust depending on IVF with D5 to avoid overfeeding    Goals:   1. Tolerate enteral feeding  2. Meet estimated needs        Kaitlynn Mcarthur RD

## 2021-06-11 NOTE — PROGRESS NOTES
"RENAL PROGRESS NOTE     CC: FRANK follow up    ROS: Since last see, patient extubated and breathing comfortably at present. Denies dizziness, nausea/vomiting. No pain in hip unless moved. Feels thirsty. No other complaints.     Assessment and Plan:    1. Acute kidney injury. Likely hemodynamic shifts perioperatively causing ATN. Good urine output with creatinine trending down to near baseline.  She did not require dialysis.  Continue to monitor.  2. CKD stage 4: Baseline creatinine of ~ 2 mg/dL due to acquired absence of right kidney (Removed decades ago - reasons unclear.)  3. Hypertension.  Status post hypotension now resolved and BP elevated again x 24 hours. Resume Amlodipine at 5 mg/day for now (Pre-admit dose was 10 mg).      4. Acute Hypernatremia.  Improved. Will decrease D5W to 50 ml/hr and monitor. Likely D/C when taking PO liquids adequately.  5. Anemia: Secondary to CKD. Improved and receiving Venofer course due to iron deficiency.   6. Hyperkalema: Normalized after high dose diuretics, hold for now and trend.  7. Acute Respiratory failure: Off vent and BiPAP. Thought secondary to pneumonia/COPD. No fluid overload on 7/17 CXR which was personally reviewed.  Will D/C Lasix given evolving metabolic acidosis and weight down 4 Kg since admission (Weights likely inaccurate given wide variation).    8. Metabolic acidosis by ABG today. D/C Lasix given volume contraction contributing.    Alfredo Nguyen MD  [facility]  [phone] (off)  [phone] (pgr)      PHYSICAL EXAM  Vitals:    07/17/17 0900   BP: 168/86   Pulse: 81   Resp: 28   Temp:    SpO2: 94%     /86  Pulse 81  Temp 98  F (36.7  C) (Oral)   Resp 28  Ht 5' 6\" (1.676 m)  Wt 146 lb 6.4 oz (66.4 kg)  SpO2 94%  BMI 23.63 kg/m2      Intake/Output Summary (Last 24 hours) at 07/17/17 1007  Last data filed at 07/17/17 0533   Gross per 24 hour   Intake             1620 ml   Output             6625 ml   Net            " -5005 ml     Vent Mode: CPAP/PSV  FiO2 (%):  [25 %-30 %] 30 %  PEEP/CPAP (cm H2O):  [5 cm H2O] 5 cm H2O  Wt Readings from Last 3 Encounters:   07/17/17 146 lb 6.4 oz (66.4 kg)   07/11/16 163 lb (73.9 kg)   02/22/16 144 lb 12 oz (65.7 kg)       Physical Exam:   GENERAL: calm and comfortable, alert  EYES: No scleral icteruas, conjunctiva clear  ENT: Hearing normal, Oral mucosa dry  RESP: Decreased air movement bilaterally. No rales. Normal effort.  CV: RRR, no murmurs. No leg edema.    GI: Active BS, Soft, NT/ND, no masses or HSM  Musculoskeletal: Normal muscle bulk/ tone; left hip tender with movement.   SKIN: No rash, warm/ dry  PSYCH:  normal mood and affect but poor recent recollection.  Lymph: No cervical/ inguinal adenopathy      LABORATORIES    Results from last 7 days  Lab Units 07/17/17  0422 07/16/17  0540 07/15/17  0514   LN-WHITE BLOOD CELL COUNT thou/uL 9.2 7.6 5.6   LN-HEMOGLOBIN g/dL 10.0* 9.2* 9.0*   LN-HEMATOCRIT % 29.7* 27.9* 27.3*   LN-PLATELET COUNT thou/uL 121* 103* 101*       Results from last 7 days  Lab Units 07/17/17  0422 07/16/17  0540 07/15/17  0514   LN-SODIUM mmol/L 144  144 149* 146*   LN-POTASSIUM mmol/L 3.9  3.9 3.8 3.8   LN-CHLORIDE mmol/L 97*  97* 107 107   LN-CO2 mmol/L 34*  34* 29 28   LN-BLOOD UREA NITROGEN mg/dL 96*  96* 89* 90*   LN-CREATININE mg/dL 2.18*  2.18* 2.32* 2.94*   LN-CALCIUM mg/dL 9.0  9.0 8.7 8.4*   LN-PROTEIN TOTAL g/dL 5.7* 5.7* 5.4*   LN-BILIRUBIN TOTAL mg/dL 1.1* 0.8 0.6   LN-ALKALINE PHOSPHATASE U/L 46 49 52   LN-ALT (SGPT) U/L 7 <6 <6   LN-AST (SGOT) U/L 16 16 14       Results from last 7 days  Lab Units 07/17/17  0422   LN-INR  1.14*       Results from last 7 days  Lab Units 07/10/17  1321   LN-ABORH  O POS       Results from last 7 days  Lab Units 07/17/17  0422   LN-MAGNESIUM mg/dL 1.9       RADIOLOGY REPORTS    ECHOCARDIOGRAM    MEDICATIONS    acetaminophen  1,000 mg Oral TID    Or     acetaminophen  650 mg Rectal TID     furosemide  60 mg  Intravenous BID - diuretic     gabapentin  100 mg Oral QHS     insulin aspart (NovoLOG) injection   Subcutaneous Q6H     ipratropium-albuterol  3 mL Nebulization QID - RT     levETIRAcetam  500 mg Oral BID     methylPREDNISolone sodium succinate  40 mg Intravenous Q6H     omeprazole  20 mg Oral QHS     polyethylene glycol  17 g Oral DAILY     rOPINIRole  2 mg Oral QHS     senna-docusate  1 tablet Oral BID     sodium chloride  10-30 mL Intravenous Q8H FIXED TIMES     sodium chloride  3 mL Intravenous Line Care     white petrolatum-mineral oil   Both Eyes Q8H     acetaminophen, albuterol, aluminum-magnesium hydroxide-simethicone, bacitracin, benzocaine-menthol, bisacodyl, dextrose 50 % (D50W), fentaNYL pf, glucagon (human recombinant), hydrALAZINE, HYDROmorphone, insulin regular infusion 0.5 unit/mL, labetalol, lidocaine, loratadine, LORazepam, metoclopramide **OR** metoclopramide **OR** metoclopramide, naloxone **OR** naloxone, ondansetron **OR** ondansetron, oxyCODONE, polyvinyl alcohol, sodium chloride, sodium chloride, sodium chloride    dextrose 5% 75 mL/hr (07/17/17 0158)     insulin regular infusion 0.5 unit/mL           Above laboratories and medications were personally reviewed during evaluation today.

## 2021-06-11 NOTE — CONSULTS
NEPHROLOGY CONSULTATION - [facility]  REASON FOR CONSULTATION: weston/ckd 4    HISTORY OF PRESENT ILLNESS: 82 yo wf with h/o ckd 4, single kidney, htn, copd on chronic oxygen, cad, meningioma admit on 7/9 after fall, with left hip fracture.  Had internal fixation with pins on 7/10.  Postop developed hypercapnic resp failure and required intubation.  Remains on ventilator now.  Has developed weston - creat today is 2.35, was 1.73 on 7/10  Has also had hyperkalemia with K of 5.9 on 7/10.  She does have a single kidney, with nephrectomy >30 years ago per medical record for unclear reason.    REVIEW OF SYSTEMS:Comprehensive review of systems obtained and otherwise negative.    Past Medical History:   Diagnosis Date     Acute on chronic kidney failure      CAD (coronary artery disease)      Cataract      Cerebral edema      COPD (chronic obstructive pulmonary disease)      Depression      Encephalopathy      Hypertension      Meningioma      Myocardial infarction      Osteoarthritis      Stroke        Social History     Social History     Marital status:      Spouse name: N/A     Number of children: N/A     Years of education: N/A     Occupational History     Not on file.     Social History Main Topics     Smoking status: Former Smoker     Types: Cigarettes     Smokeless tobacco: Never Used      Comment: States she quit smoking 3 weeks ago on 7/11/16     Alcohol use Not on file     Drug use: No     Sexual activity: Not on file     Other Topics Concern     Not on file     Social History Narrative       History reviewed. No pertinent family history. unable to obtain due to encephalopathy    Allergies   Allergen Reactions     Corticosteroids (Glucocorticoids)      Cortisone Acetate      Hydroxyzine Hcl        MEDICATIONS:    acetaminophen  1,000 mg Enteral Tube TID    Or     acetaminophen  650 mg Rectal TID     budesonide  0.5 mg Nebulization BID - RT     chlorhexidine  15 mL Topical Q12H     formoterol  fumarate  20 mcg Nebulization Q12H     furosemide  80 mg Intravenous Q8H     gabapentin  100 mg Enteral Tube QHS     insulin aspart (NovoLOG) injection   Subcutaneous Q6H     ipratropium-albuterol  3 mL Nebulization Q4H - RT     iron sucrose (VENOFER) IVPB  100 mg Intravenous Q24H     levETIRAcetam  (KEPPRA) solution 100 mg/mL  500 mg Enteral Tube BID    Or     levETIRAcetam (KEPPRA) IVPB  500 mg Intravenous BID     methylPREDNISolone sodium succinate  60 mg Intravenous Q6H     omeprazole  20 mg Enteral Tube QHS    Or     omeprazole  20 mg Oral QHS    Or     pantoprazole  40 mg Intravenous QHS     piperacillin-tazobactam (ZOSYN) IV  3.375 g Intravenous Q12H     polyethylene glycol  17 g Oral DAILY     rOPINIRole  2 mg Enteral Tube QHS     senna-docusate  1 tablet Oral BID    Or     senna (SENOKOT) syrup  8.8 mg Enteral Tube BID     sodium chloride  10-30 mL Intravenous Q8H FIXED TIMES     sodium chloride  3 mL Intravenous Line Care     sodium polystyrene sulfonate  30 g Enteral Tube Once     vecuronium (NORCURON) syringe 1 mg/1 mL  0.1 mg/kg Intravenous Once     viscous lidocaine HC  15 mL Oral Once     white petrolatum-mineral oil   Both Eyes Q8H         PHYSICAL EXAM    Vitals:    07/12/17 1400   BP: 110/53   Pulse: 76   Resp: 18   Temp:    SpO2: 100%           Gen: NAD, chronically ill appearing  HEENT: No icterus, NC/AT, trachea intubated  CARDIOVASCULAR: RR, no rub,  Trace leg edema  PULMONARY: CTA ant No cyanosis, poor air movement  GASTROINTESTINAL: soft, NT/ND  MSK: diffuse severe muscle atrophy, warm  NEURO: unresponsive, does not follow commands  PSYCHIATRIC: unable to eval due to encephalopathy  SKIN: Pale, no jaundice, no rash.    LABORATORIES      Results from last 7 days  Lab Units 07/12/17  0454 07/11/17  1052 07/11/17  0423   LN-WHITE BLOOD CELL COUNT thou/uL 3.8* 3.3* 4.1   LN-HEMOGLOBIN g/dL 9.3* 7.3* 7.8*   LN-HEMATOCRIT % 26.9* 22.9* 24.9*   LN-PLATELET COUNT thou/uL 113* 108* 105*       Results  from last 7 days  Lab Units 07/12/17  0454 07/11/17  2016 07/11/17  1052  07/09/17  1440   LN-SODIUM mmol/L 144 142 143  < > 142   LN-POTASSIUM mmol/L 4.7 4.8 5.6*  < > 5.3*   LN-CHLORIDE mmol/L 110* 111* 115*  < > 107   LN-CO2 mmol/L 22 20* 18*  < > 27   LN-BLOOD UREA NITROGEN mg/dL 45* 42* 37*  < > 19   LN-CREATININE mg/dL 2.35* 2.21* 2.04*  < > 1.66*   LN-CALCIUM mg/dL 8.2* 8.1* 7.6*  < > 8.4*   LN-PROTEIN TOTAL g/dL 4.8*  --   --   --  5.8*   LN-BILIRUBIN TOTAL mg/dL 0.5  --   --   --  0.4   LN-ALKALINE PHOSPHATASE U/L 53  --   --   --  77   LN-ALT (SGPT) U/L <6  --   --   --  <6   LN-AST (SGOT) U/L 10  --   --   --  12   < > = values in this interval not displayed.      ASSESSMENT:   84 yo wf with h/o ckd 4, single kidney, htn, copd on chronic oxygen, cad, meningioma admit on 7/9 after fall, with left hip fracture with ongoing postop resp failure and kareem    PLAN:  1. Kareem/ckd 4 - baseline ckd 4 due to htn, single kidney, likely significant vascular disease given smoking history.  Acutely worse in setting of recent hip fracture with severe hypotension on 7/10 (sbp in 70s.)  Non-oliguric now, cont to renally dose meds, optimize hemodynamics, avoid nephrotoxins.  Cont to monitor daily bmp with ongoing diuresis and if creatinine continues to rise in am, will need to back off on diuretics as not clearly volume up clinically.  2. Resp failure - ongoing, with underlying copd and on chronic oxygen, expect at risk for prolonged vent dependence (which does not seem to be in keeping with prior palliative care discussions. . .)   3. Hyperkalemia - due to ckd, likely was acutely worse due to severe acidosis.  If recurrent, can change to renal tube feeds.  4. Anemia - due to ckd, recent blood loss with surgery.  Iron stores low for ckd, agree with course of timothy HAYNES/w Dr Felice Nur MD  [facility]  [phone]

## 2021-06-11 NOTE — CONSULTS
ICU CONSULT NOTE  7/10/2017      Referring Provider: Dr. Christian  Reason for Consult: Postoperative hypercapnic respiratory failure    HPI:  Ms. Jhoana Ramirez is a 83-year-old female with past medical history significant for CKD 3, coronary artery disease, COPD with chronic hypoxic respiratory failure requiring 2 L/min nasal cannula, hypertension, meningioma, and history of stroke who experienced left hip pain after fall found to have a left intertrochanteric fracture that is nondisplaced subsequently underwent left hip nailing this afternoon.  Postoperatively patient became more somnolent and ABG revealed severe hypercapnic respiratory failure.  Patient was subsequently placed on BiPAP with little improvement.  Subsequently did require reintubation.  Her initial ABG showed pH of 6.99 and PCO2 of 114.  This did improve to a PCO2 of 80 prior to discharge from PACU to the ICU.  Upon arrival to the ICU patient is intubated and unable to provide any further history.  Review of anesthesia.  Patient was mildly hypotensive during the case requiring George-Synephrine.  She did receive intraoperative Dilaudid.  She has received 1 unit of packed red blood cells.  Upon arrival in the ICU she was hypotensive with blood pressure in the 70s systolic.  She did have some urine output.  Bedside ultrasound revealed good contractility of the heart with a normal heart rate.  She had minimal variation in her IVC on bedside ultrasound.  She was bolused 1 L of saline and was switched to saline infusion as she did have a preoperative potassium of 5.3.  Patient's blood pressure did subsequently improve.  Otherwise patient had no significant issues intraoperatively.  She remains mechanically ventilated.  She is easily aroused does appear to have some left hip pain.  There is some blood on the dressings of the left hip.  Otherwise patient appears comfortable and is easily arousable.      Past Medical History:   Diagnosis Date     Acute on  chronic kidney failure      CAD (coronary artery disease)      Cataract      Cerebral edema      COPD (chronic obstructive pulmonary disease)      Depression      Encephalopathy      Hypertension      Meningioma      Myocardial infarction      Osteoarthritis      Stroke      Past Surgical History:   Procedure Laterality Date     CATARACT EXTRACTION       CHOLECYSTECTOMY       NEPHRECTOMY      Left      PARTIAL KNEE ARTHROPLASTY      Left     PICC  1/29/2016          Allergies   Allergen Reactions     Corticosteroids (Glucocorticoids)      Cortisone Acetate      Hydroxyzine Hcl      History reviewed. No pertinent family history.  Social History     Social History     Marital status:      Spouse name: N/A     Number of children: N/A     Years of education: N/A     Occupational History     Not on file.     Social History Main Topics     Smoking status: Former Smoker     Types: Cigarettes     Smokeless tobacco: Never Used      Comment: States she quit smoking 3 weeks ago on 7/11/16     Alcohol use Not on file     Drug use: No     Sexual activity: Not on file     Other Topics Concern     Not on file     Social History Narrative       acetaminophen  1,000 mg Oral TID    Or     acetaminophen  650 mg Rectal TID     aspirin  325 mg Oral BID    Or     aspirin  300 mg Rectal BID     budesonide  0.5 mg Nebulization BID - RT     ceFAZolin (ANCEF) IV  1 g Intravenous Q8H     chlorhexidine  15 mL Topical Q12H     formoterol fumarate  20 mcg Nebulization Q12H     [START ON 7/11/2017] gabapentin  100 mg Oral QHS     [START ON 7/11/2017] insulin aspart (NovoLOG) injection   Subcutaneous Q4H FIXED TIMES     levETIRAcetam (KEPPRA) IVPB  500 mg Intravenous Q12H     methylPREDNISolone sodium succinate  40 mg Intravenous Q12H     oxyCODONE  5 mg Oral Q6H     pantoprazole  40 mg Intravenous Q24H     [START ON 7/11/2017] polyethylene glycol  17 g Oral DAILY     [MAR Hold] rOPINIRole  2 mg Oral QHS     senna-docusate  1 tablet Oral  "BID    Or     senna (SENOKOT) syrup  8.8 mg Enteral Tube BID     [MAR Hold] sodium chloride  3 mL Intravenous Line Care         Vitals  BP 92/52  Pulse 60  Temp 97.5  F (36.4  C)  Resp 16  Ht 5' 6\" (1.676 m)  Wt 144 lb 9.6 oz (65.6 kg)  SpO2 100%  BMI 23.34 kg/m2     I/O last 3 completed shifts:  In: 650 [I.V.:300; Blood:350]  Out: 125 [Urine:125]  Vent Mode: VCV  FiO2 (%):  [30 %-60 %] 40 %  S RR:  [15-16] 16  S VT:  [350 mL-400 mL] 400 mL  PEEP/CPAP (cm H2O):  [3 cm H2O-5 cm H2O] 5 cm H2O  Minute Ventilation (L/min):  [6.2 L/min-6.8 L/min] 6.8 L/min  PIP:  [26 cm H2O-36 cm H2O] 26 cm H2O  MAP (cm H2O):  [8-9] 8    EXAM:  GEN: patient intubated sedated  HEENT: PERRL, EOMS, OP patent, trachea midline  PULM: unlabored respirations, moving good air, synchronous with vent, no significant wheezing  CV: RRR, S1, S2, no murmurs, rubs, gallops, bilateral symmetric pulse  ABD: soft nontender, non distended, hypo-active bowel sound, no HSM  EXT : warm well perfused, no cyanosis, clubbing, no edema, left hip dressing has some sanguinous collection though minimal.  Skin is soft with no evidence of hematoma  NEURO: groslly intact without focal deficit  SKIN: no obvious rash, lesion    DATA  All laboratory and radiology has been personally reviewed by myself today.    Results from last 7 days  Lab Units 07/10/17  0314 07/09/17  1440   LN-SODIUM mmol/L 143 142   LN-POTASSIUM mmol/L 5.4* 5.3*   LN-CHLORIDE mmol/L 108* 107   LN-CO2 mmol/L 25 27   LN-BLOOD UREA NITROGEN mg/dL 22 19   LN-CREATININE mg/dL 1.73* 1.66*   LN-CALCIUM mg/dL 8.1* 8.4*   LN-ALBUMIN g/dL  --  3.1*   LN-PROTEIN TOTAL g/dL  --  5.8*   LN-BILIRUBIN TOTAL mg/dL  --  0.4   LN-ALKALINE PHOSPHATASE U/L  --  77   LN-ALT (SGPT) U/L  --  <6   LN-AST (SGOT) U/L  --  12       Results from last 7 days  Lab Units 07/10/17  1605   LN-WHITE BLOOD CELL COUNT thou/uL 7.6   LN-HEMOGLOBIN g/dL 9.7*   LN-HEMATOCRIT % 31.9*   LN-PLATELET COUNT thou/uL 140       Chest X-Ray: " Post intubation chest x-ray is personally reviewed.  Endotracheal tube is bowing towards the right mainstem bronchus.  Otherwise there is some flattening of the diaphragms no evidence of hyperinflation but otherwise clear.      ASSESSMENT  Jhoana Ramirez is a 83 y.o. female admitted to ICU for postoperative hypercapnic respiratory failure, chronic hypoxic respiratory failure, COPD with emphysema, coronary artery disease, hypertension, chronic kidney disease.      PLAN BY SYSTEM  Neuro:  1.  Anxiety/Analgesia  PLAN  -We will use Precedex and fentanyl.  Both should be easily weaned in the morning for extubation.  -Postoperative pain to be managed with fentanyl as needed and infusion if needed.    Cardiovascular:  1.  History of hypertension  2.  Coronary artery disease  3.  Postoperative hypotension  PLAN  - follow hemodynamics, markers of perfusion, goal MAP >65  -Hold amlodipine as previous  -Saline bolus as noted above  -Check lactate  -Appears patient is likely dry even though her bedside ultrasound would suggest minimal variation or IVC.  This is likely due to her chronic COPD and some underlying pulmonary hypertension  -Blood pressure improving    Pulmonary:  1.  Postoperative hypercapnic respiratory failure  2.  Chronic hypoxic respiratory failure  3.  End-stage emphysema with COPD  PLAN  - goals sats >90%  -ABG continues to trend better, will check another ABG at 0000  -Hold Symbicort and start Perforomist and Pulmicort  -Solu-Medrol 40 mg IV every 12  -DuoNeb as needed    Renal:  1.  Mild hyperkalemia preop  2.  CKD 3  PLAN  - follow, UOP, Cr  - avoid nephrotoxins  - electrolyte repletion per ICU protocols  -Check BMP now    GI:  1.  GERD  2.  N.p.o. status  PLAN  -Continue PPI    ID:  1.  No evidence of pneumonia on chest x-ray  PLAN  -Check lactate for reduced blood pressure  -Usual antibiotics for hip fracture and surgery    Heme/Onc:  1.  DVT prophylaxis  2.  Intraoperative packed red blood  cells  PLAN  - serial cbc, coags as indicated  -Check hemoglobin now    Endocrine:  1.  At risk for stress Hyperglycemia  PLAN  - ICU  Glucose/insulin protocol, goal sugar <180     ICU Prophylaxis/Checklist:  1. VAP Prophylaxis  -  HOB 30 degrees, chlorhexidine rinses  2. Stress Ulcer   - PPI  3. DVT   - Pharmacologic with heparin will be held now is immediate postop, likely start in the a.m.  - mechanical with SCD  4. Restraints required and necessary for continued patient cares  5. Lines requires and necessary for continued patient cares  6. Feeding currently n.p.o.      CODE: DNR, will continue with mechanical ventilation, once patient extubated will discuss with family about reintubation.    Patient is critically ill with total CCT spent 65 minutes thus far today.     Frederic Robert, DO  Pulmonary and Critical Care

## 2021-06-11 NOTE — PROGRESS NOTES
Nutrition Support     Date: 7/14/2017    Medical Problems:   Principal Problem:    Hip fracture  Active Problems:    Benign essential hypertension    Moderate COPD (chronic obstructive pulmonary disease)    Macrocytic anemia    Acute postoperative respiratory failure    Respiratory failure, post-operative    Acute on chronic kidney failure    Pneumonia of right lower lobe due to infectious organism    UTI (urinary tract infection)    Single kidney    Meningioma      Diet:   NPO    TF:  Tube: GJ    (placed  7/11)    Formula: Replete with Fiber  Current rate: 70 mL/hr  Goal rate: 70 mL/hr    Calories: 1680 per day  Protein: 105 gm  Carbohydrate: 189 gm  Fiber: 25 gm  Free water: 1411 mL  (in formula)    Flush: 60 ml water q 8 hr     Weight: no new weight    Weight changes since admissions:  Per I&Os: increased 14.6 lb    Labs:   Glu 154  BUN 76  Creat 3.32  K 4.1    GI:  Bowel Sounds: hyperactive  Last Stool: 7/14    Plan/Intervention:  1. Continue current enteral feeding  2. Change to renal formula if K starts climbing again    Goals:   1. Tolerate enteral feeding  2. Meet estimated needs        Kaitlynn Mcarthur RD

## 2021-06-11 NOTE — PROGRESS NOTES
PT taken to CT via transport vent. RN/RT transported. Upon return to ICU Pt placed back on full vent support. No complications and pt resting comfortably. RT will continue to monitor.

## 2021-06-11 NOTE — PROGRESS NOTES
"  Clinical Nutrition Therapy Assessment Note      Reason for Assessment:   Jhoana Ramirez is a 83 y.o. female assessed by the registered Dietitian for consult and nutrition risk screen for weight loss of 25 lbs in unknown amount of time and decreased po intake >90 days (per son). PMH includes CAD, COPD, chronic low back pain, HTN, MI, CVA, CKD 3 s/p nephrectomy, meningioma. Pt found to have left greater trochanteric fracture after a fall.     Nutrition History:  Pt stated she eats a regular, general diet. She stated she eats 3 meals per day plus snacks and does not take nutrition supplements.   Patient has the following cultural/Adventism food needs or preferences: none noted   Patient has the following food allergies or intolerances: nkfa     Current Nutrition Prescription:   Diet: NPO since midnight for possible procedure today    Current Nutrition Intake:  NPO since midnight for possible procedure today, refused dinner last night.     Anthropometrics:  Height: 5' 6\" (167.6 cm)  Admission weight:  Weight: 144 lb 9.6 oz (65.6 kg)  BMI (Calculated): 23.4  BMI indication: 18.5-24.9 normal weight  Ideal body weight 130 lbs +/- 10%   % Ideal body weight 110%  Usual body weight: pt states usual weight is 127 lbs, nowhere in chart has pt weighed 127 lbs.   Weight History: 20 lb (12%) weight loss in 1 year per chart review (not significant)  Wt Readings from Last 10 Encounters:   07/09/17 144 lb 9.6 oz (65.6 kg)   07/11/16 163 lb (73.9 kg)   02/22/16 144 lb 12 oz (65.7 kg)   02/18/16 144 lb 12 oz (65.7 kg)   02/10/16 151 lb 12 oz (68.8 kg)   02/03/16 164 lb 7.4 oz (74.6 kg)     Physical Findings:  The patient has the following physical signs which could indicate malnutrition: slight clavicle prominence    GI Status/Output:   The patient's GI symptoms include: WDL per nursing   Last BM 7/9/17  Bowel Sounds present    Skin/Wound:  Shaheen score Shaheen Scale Score: 13    Medications:  Medications reviewed.    Labs:  Labs " reviewed    Assessed Nutritional Needs:  Assessment weight is 65 kg, with a weight source of current  Estimated Energy Needs: 2277-5859 kcals daily per 25-30 kcal/kg   Estimated Protein Needs: 65-78 g daily, 1-1.2 g/kg (for wound healing post surgery)  Estimated Fluid Needs: 4851-1672 mls daily, 25-30 mls/kg    Malnutrition Assessment does not meet 2 or more criteria at this time.     Nutrition Risk Level: moderate risk    Nutrition dx:  Increased nutrient needs r/t surgical wound healing aeb upcoming hip surgery     Goal:  Po intake >50% of meals   Meet >75% estimated nutrient intake, especially protein     Intervention:  Encourage po intake when diet advanced  Encourage protein intake (provide education if pt needs)   Consider supplement such as Chris for improved wound healing.     Monitoring:  Diet advancement  Po intake, protein intake   Diet needs   See Care Plan for Problems, Goals, and Interventions.
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
## 2021-06-11 NOTE — PROGRESS NOTES
Nutrition Follow-up    Date: 7/17/2017    Medical Problems:   Principal Problem:    Hip fracture  Active Problems:    Benign essential hypertension    Moderate COPD (chronic obstructive pulmonary disease)    Macrocytic anemia    Acute postoperative respiratory failure    Respiratory failure, post-operative    Acute on chronic kidney failure    Pneumonia of right lower lobe due to infectious organism    UTI (urinary tract infection)    Single kidney    Meningioma    Hip fracture, left, closed, with routine healing, subsequent encounter      Nutrition Orders:   NPO day 2  TF stopped, OG out  Swallow eval pending    Current Weight: 146 lb, stable    Labs:   Glu 144  Bun 96  Creat 2.18  Alb 3.8    GI:  Bowel sounds: present  Last stool: 7/17    Malnutrition Assessment:    Inadequate intake day 2, plans pending    Plan/Intervention:    1. Await swallow eval  2. Recommend renal modifications if ok to start oral diet    Goals:    1. Restart nutrition as able - oral diet vs enteral feeding      Kaitlynn Mcarthur RD

## 2021-06-11 NOTE — PROGRESS NOTES
Occupational Therapy  Patient intubated and not appropriate for OT at this time. Please re-order when medical status improves and patient able to participate in OT evaluation. D/C OT.  7/12/2017 Abdiel Coronel, OTR/L

## 2021-06-11 NOTE — PROGRESS NOTES
"RENAL PROGRESS NOTE     CC: FRANK follow up    ROS: Since last see, there were no acute events overnight. She denies fever, dizziness, nausea/vomiting and chest pain. No pain in hip unless she is moved. She feels thirsty.     Assessment and Plan:    1. Acute kidney injury. Likely hemodynamic shifts perioperatively causing ATN. Good urine output with creatinine trending down to near baseline.  She did not require dialysis.  Continue to monitor.  2. CKD stage 4: Baseline creatinine of ~ 2 mg/dL due to acquired absence of right kidney (Removed decades ago - reasons unclear.)  3. Hypertension.  Status post hypotension now resolved and BP now at reasonable in-hospital control. No changes trend. Restart amlodipine 5mg/dayif BP increases.     4. Acute Hypernatremia. RESOLVED. Continue D5W and monitor. Can D/C when taking PO liquids adequately.  5. Anemia: Secondary to CKD. Improved and receiving Venofer course due to iron deficiency.   6. Hyperkalema: Normalized after high dose diuretics, hold for now and trend.  7. Acute Respiratory failure: Off vent and BiPAP. Thought secondary to pneumonia/COPD. No fluid overload on 7/17 CXR which was personally reviewed.  Lasix stopped given evolving metabolic alkalosis and weight down since admission (Weights likely inaccurate given wide variation).    8. Metabolic alkalosis: Lasix stopped and IVF started. This is in the setting of hypovolemia.     Yash Toth MD  [facility]  Pager: [phone]   Office: [phone]         PHYSICAL EXAM  Vitals:    07/18/17 0744   BP: 138/69   Pulse: 71   Resp: 24   Temp: 97.6  F (36.4  C)   SpO2: 91%     /69 (Patient Position: Lying)  Pulse 71  Temp 97.6  F (36.4  C) (Axillary)   Resp 24  Ht 5' 6\" (1.676 m)  Wt 146 lb 6.4 oz (66.4 kg)  SpO2 91%  BMI 23.63 kg/m2      Intake/Output Summary (Last 24 hours) at 07/18/17 1015  Last data filed at 07/18/17 0600   Gross per 24 hour   Intake              679 ml   Output    "          1375 ml   Net             -696 ml        Wt Readings from Last 3 Encounters:   07/17/17 146 lb 6.4 oz (66.4 kg)   07/11/16 163 lb (73.9 kg)   02/22/16 144 lb 12 oz (65.7 kg)       Physical Exam:   GENERAL: calm and comfortable, alert  EYES: No scleral icteruas, conjunctiva clear  ENT: Hearing normal, Oral mucosa dry  RESP: Decreased air movement bilaterally. No rales. Normal effort.  CV: RRR, no murmurs. No leg edema.    GI: Active BS, Soft, NT/ND, no masses or HSM  Musculoskeletal: Normal muscle bulk/ tone; left hip tender with movement.   SKIN: No rash, warm/ dry  PSYCH:  normal mood and affect but poor recent recollection.  Lymph: No cervical/ inguinal adenopathy      LABORATORIES    Results from last 7 days  Lab Units 07/17/17  0422 07/16/17  0540 07/15/17  0514   LN-WHITE BLOOD CELL COUNT thou/uL 9.2 7.6 5.6   LN-HEMOGLOBIN g/dL 10.0* 9.2* 9.0*   LN-HEMATOCRIT % 29.7* 27.9* 27.3*   LN-PLATELET COUNT thou/uL 121* 103* 101*       Results from last 7 days  Lab Units 07/18/17  0618 07/17/17  0422 07/16/17  0540 07/15/17  0514   LN-SODIUM mmol/L 142 144  144 149* 146*   LN-POTASSIUM mmol/L 3.6 3.9  3.9 3.8 3.8   LN-CHLORIDE mmol/L 94* 97*  97* 107 107   LN-CO2 mmol/L 34* 34*  34* 29 28   LN-BLOOD UREA NITROGEN mg/dL 98* 96*  96* 89* 90*   LN-CREATININE mg/dL 2.01* 2.18*  2.18* 2.32* 2.94*   LN-CALCIUM mg/dL 8.8 9.0  9.0 8.7 8.4*   LN-PROTEIN TOTAL g/dL  --  5.7* 5.7* 5.4*   LN-BILIRUBIN TOTAL mg/dL  --  1.1* 0.8 0.6   LN-ALKALINE PHOSPHATASE U/L  --  46 49 52   LN-ALT (SGPT) U/L  --  7 <6 <6   LN-AST (SGOT) U/L  --  16 16 14       Results from last 7 days  Lab Units 07/17/17  0422   LN-INR  1.14*           Results from last 7 days  Lab Units 07/18/17  0618   LN-MAGNESIUM mg/dL 1.7*       RADIOLOGY REPORTS    ECHOCARDIOGRAM    MEDICATIONS    acetaminophen  1,000 mg Oral TID     gabapentin  100 mg Oral QHS     heparin (PF)  5,000 Units Subcutaneous Q8H FIXED TIMES     levETIRAcetam (KEPPRA) IVPB  500  mg Intravenous Q12H     magnesium sulfate IVPB  2 g Intravenous Once     methylPREDNISolone sodium succinate  40 mg Intravenous Q6H     polyethylene glycol  17 g Oral DAILY     senna-docusate  1 tablet Oral BID     sodium chloride  10-30 mL Intravenous Q8H FIXED TIMES     sodium chloride  3 mL Intravenous Line Care     white petrolatum-mineral oil   Both Eyes Q8H     acetaminophen, albuterol, aluminum-magnesium hydroxide-simethicone, bacitracin, benzocaine-menthol, bisacodyl, HYDROmorphone, ipratropium-albuterol **AND** [] Nebulizer treatment intermittent, labetalol, lidocaine, loratadine, LORazepam, metoclopramide **OR** [DISCONTINUED] metoclopramide **OR** [DISCONTINUED] metoclopramide, naloxone **OR** naloxone, ondansetron **OR** ondansetron, oxyCODONE, polyvinyl alcohol, sodium chloride, sodium chloride, sodium chloride    dextrose 5%-NaCl 0.9% 75 mL/hr (172)         Above laboratories and medications were personally reviewed during evaluation today.

## 2021-06-11 NOTE — BRIEF OP NOTE
Brief Operative Note    Name:  Jhoana Ramirez  Location: [facility] Main OR  Procedure Date:  7/10/2017  PCP:  Sergey Lopez MD      INTERNAL FIXATION, LEFT FRACTURE, HIP, WITH TFN PINS  (Left)    Pre-Procedure Diagnosis:    Closed nondisplaced intertrochanteric fracture of left femur, initial encounter [S72.145A]encounter [S72.22XA]     Post-Procedure Diagnosis:    Closed nondisplaced intertrochanteric fracture of left femur, initial encounter [S72.145A]    Surgeon(s):  Selwyn Graham MD    Findings:    Intertrochanteric fracture    Estimated Blood Loss:   * No blood loss documented between In Room and Out of Room log events - 7/10/2017  2:14 PM to 7/10/2017  3:36 PM *    Specimens:    * No specimens in log *       Drains:   Urethral Catheter (Active)   Site Skin Assessment Clean;Intact 7/10/2017  1:12 PM   Care/Interventions Patent and draining 7/10/2017  1:12 PM   Securement Method Stabilization device 7/10/2017  1:12 PM   Protocol 1: Patient excluded from protocol? Yes, Specific provider order to keep catheter in place 7/10/2017  8:00 AM   Protocol 2: Indication to continue catheter Prolonged immobilization (e.g.unstable spine/pelvic/hip fracture, trauma 7/10/2017  3:00 AM   Output (mL) 125 mL 7/10/2017  3:00 AM       Implants:    Implant Name Type Inv. Item Serial No.  Lot No. LRB No. Used Action   NAIL FEMORAL TFN [id number] 125 DEG STL - [id number] - [id number] 76-FEMORAL NAIL NAIL FEMORAL TFN [id number] 125 DEG STL - [id number]  [facility] [id number] Left 1 Implanted   SCREW CANNULATED [id number] STL - 04.038.100S - [id number] 80-CANNULATED SCREW SCREW CANNULATED [id number] STL - [id number]   [facility] [id number] Left 1 Implanted       Complications:    None    Huong Mathews     Date: 7/10/2017  Time: 3:36 PM

## 2021-06-11 NOTE — PROGRESS NOTES
"[facility] BRIEF UPDATE     Subjective/Interval Events:  Patient stepped down from ICU today.  She is only on BiPAP as needed.  She was extubated yesterday.  She states that her breathing feels like it is at her baseline.    Objective:  /59 (Patient Position: Semi-montaño)  Pulse 75  Temp 97.8  F (36.6  C) (Oral)   Resp 24  Ht 5' 6\" (1.676 m)  Wt 146 lb 6.4 oz (66.4 kg)  SpO2 90%  BMI 23.63 kg/m2  General: Alert and oriented.  Laying comfortably in bed. Thin, chronically ill appearing.  HEENT: Atraumatic. Mucous membranes moist. NC in place.  Resp: Respirations unlabored.   CV: On telemetry    Assessment/Plan:  Acute on chronic respiratory failure: Patient stepped down from ICU today.  Reviewed orders and discontinued previous ICU orders. Currently on 2 L O2 by NC, which she is on at home.  Still has rectal tube and horowitz in place from being in ICU.  - Continue methylprednisolone 40 mg q6h (while still NPO)  - Continue symbicort BID  - Continue duonebs QID PRN  - BiPAP as needed  - Okay to discontinue rectal tube and horowitz catheter  - PT already ordered    Failed swallow study:  Patient evaluated by speech therapy and did not pass swallow evaluation. They have recommended that she remain NPO until they evaluate her again tomorrow.  - Change keppra back to IV  - Continue methylprednisolone  - Hold home oral meds, hopefully will be able to resume tomorrow  - D5 NS at 75 mL/hr while NPO    Patient discussed with overnight faculty physician, Dr. Lerner.  Patient will be signed out to Dr. Armenta and the rest of the day team tomorrow AM.    Falguni Salcedo MD ([place]) PGY-3  [facility]  7/17/2017  Pager: [phone]      7/18/2017  [facility] Faculty Attestation  I have seen and examined the patient. 7/17/17  I have discussed the case with the resident physician(s), Dr. Sharma  I agree with the findings, assessment and plan.    Swapnil Armenta MD          "

## 2021-06-11 NOTE — ANESTHESIA POSTPROCEDURE EVALUATION
Patient: Jhoana Ramirez  INTERNAL FIXATION, LEFT FRACTURE, HIP, WITH TFN PINS   Anesthesia type: general    Patient location: PACU  Last vitals:   Vitals:    07/10/17 1740   BP: (!) 87/51   Pulse: 82   Resp: 21   Temp:    SpO2: 96%     Post vital signs: stable  Level of consciousness: awake and responds to simple questions  Post-anesthesia pain: pain controlled  Post-anesthesia nausea and vomiting: no  Pulmonary: unassisted, return to baseline  Cardiovascular: stable and blood pressure at baseline  Hydration: adequate  Anesthetic events: no    QCDR Measures:  ASA# 11 - Nithya-op Cardiac Arrest: ASA11B - Patient did NOT experience unanticipated cardiac arrest  ASA# 12 - Nithya-op Mortality Rate: ASA12B - Patient did NOT die  ASA# 13 - PACU Re-Intubation Rate: ASA13A - Patient required new airway mgmt prior to PACU D/C  ASA# 10 - Composite Anes Safety: ASA10A - No serious adverse event  ASA# 38 - New Corneal Injury: ASA38A - No new exposure keratitis or corneal abrasion in PACU    Additional Notes:  Patient was extubated in OR.  But had problems with ventilation in pacu despite bipap.  abg showed resp acidosis.  Patient was also lethargic.  So decision made to reintubate patient to control ventilation and also protect her airway.  On the vent patient started to wake up.  Eventually she met all weaning parameters.  A trial of extubation was done and patient cooperated and did well.  Sign out was given to Dr. Souza.

## 2021-06-11 NOTE — PROGRESS NOTES
"Pulmonology ICU Daily Progress Note    Assessment & Plan: Jhoana Ramirez is a 83 y.o. female with a past medical history of meningioma, CAD, CKD3 with single kidney, COPD on home O2, HTN, and anemia admitted tjo ICU fr acute hypoxic respiratory failure post-surgery. She had a L trochanteric fracture that was pinned 7/10/2017.     Subjective 7/15: Overnight patient failed weaning again.  Thinks underlying COPD is probably worse than suspected continues to have a left lower lobe infiltrate after aspiration event cultures are negative to date will finish a 5 day course  of antibiotics patient is starting to wake up moves all extremities    1. Acute postoperative respiratory failure most likely secondary to combination of being fluid overloaded which is now resolved as well as COPD and left lower lobe pneumonia.-   Continue Zosyn for aspiration pneumonia  Continue steroids and bronchodilators  Chest x-ray today should continue to show left lower lobe infiltrate does not appear to be fluid overloaded lines and tubes are all in good position  Will attempt weaning again today in the morning and afternoon she is failed last several days secondary to increased respiratory rate and decreased tidal volumes discussed with respiratory therapy  ;   2. Aspiration pneumonia - Day 3/5 Zosyn;     3. Urinary tract infection -with Klebsiella and E. coli will repeat UA today is on antibiotics at this point in time     4. Pleural effusions  - clinically \"wet\"; kidney cannot tolerate Lasix;  5. Acute on chronic kidney failure - clinically fluid overloaded, initially at this point in time appears to be euvolemic appears to do better with better blood pressure is is getting albumin as far as antihypertensives will defer to nephrology at this point in time   Lasix, Lasix DC'ed; BMP in am   6. Anemia - Hgb drop; DC heparin; is not tolerating heparin so remains off that has SCDs for DVT prophylaxis    7. Trochanteric hip fracture, status " post surgical repair 7/10/2017 - orthopedics following site looks okay at this point in time    8. History of seizure disorder - Keppra    #9 nutrition is getting tube feedings    10 DVT and GI prophylaxis SCDs and Protonix    11 continues to need Milner for urine output monitoring    Principal Problem:    Hip fracture  Active Problems:    Benign essential hypertension    Moderate COPD (chronic obstructive pulmonary disease)    Macrocytic anemia    Acute postoperative respiratory failure    Respiratory failure, post-operative    Acute on chronic kidney failure    Pneumonia of right lower lobe due to infectious organism    UTI (urinary tract infection)    Single kidney    Meningioma    Hip fracture, left, closed, with routine healing, subsequent encounter    Diet: OG renal  Fluids: normal saline 25 mL/hr   VTE Prophylaxis: hose and foot pumps    Status: inpatient  Length of Stay: 6     Wellington Viveros MD   Pulmonary and critical care medicine     Critical care time today is 45 minutes    Objective  Vital signs in last 24 hours  Temp:  [98.1  F (36.7  C)-98.7  F (37.1  C)] 98.3  F (36.8  C)  Heart Rate:  [] 98  Resp:  [18-28] 23  BP: (131-181)/() 155/69  FiO2 (%):  [30 %-100 %] 30 %  160 lb 1.6 oz (72.6 kg)  Physical Exam  General appearance: Patient appears older than her stated age.  She has an ET tube and NG tube in place she is not using any accessory muscles   HEENT normocephalic atraumatic with ET tube NG tube in place   Neck;: No elevated neck veins bruits or thyromegaly  Chest wall and back no crepitus  Lungs diminished breath sounds with some faint crackles at the bases  Heart rate and rhythm are regular.  There are no murmurs rubs or gallops  Abdomen is soft there is no rebound guarding flank or periumbilical ecchymosis she has bowel sounds  Extremities reveal no edema she has SCDs in lower extremities  Neuro remains sedated on ventilator when sedation is lightened up she is moving all extremities  and following commands  skin: Skin color, texture, turgor normal. No rashes or lesions   2+ distal pulses throughout both upper and lower extremities    Intake/Output last 3 shifts  I/O last 3 completed shifts:  In: 3880.8 [I.V.:1230.8; NG/GT:1850; IV Piggyback:800]  Out: 3800 [Urine:3000; Stool:800]  Pertinent Labs     Results from last 7 days  Lab Units 07/15/17  0514 07/14/17  1137 07/14/17 0307 07/13/17  0429   LN-WHITE BLOOD CELL COUNT thou/uL 5.6  --  7.1  --  7.5   LN-HEMOGLOBIN g/dL 9.0* 8.4* 8.4*  < > 9.0*   LN-HEMATOCRIT % 27.3*  --  26.3*  --  25.8*   LN-PLATELET COUNT thou/uL 101*  --  103*  --  111*   < > = values in this interval not displayed.    Results from last 7 days  Lab Units 07/15/17  0514 07/14/17  0307 07/13/17  0429   LN-SODIUM mmol/L 146* 143  143 144   LN-POTASSIUM mmol/L 3.8 4.1  4.1 4.0   LN-CHLORIDE mmol/L 107 107  107 108*   LN-CO2 mmol/L 28 25  25 26   LN-BLOOD UREA NITROGEN mg/dL 90* 76*  76* 56*   LN-CREATININE mg/dL 2.94* 3.32*  3.32* 2.77*   LN-CALCIUM mg/dL 8.4* 7.8*  7.8* 8.0*   LN-ALBUMIN g/dL 3.5 2.7*  2.7* 2.5*   LN-PROTEIN TOTAL g/dL 5.4* 4.8* 4.7*   LN-BILIRUBIN TOTAL mg/dL 0.6 0.3 0.4   LN-ALKALINE PHOSPHATASE U/L 52 53 49   LN-ALT (SGPT) U/L <6 <6 <6   LN-AST (SGOT) U/L 14 15 10       Results from last 7 days  Lab Units 07/15/17  0514 07/14/17  0307 07/13/17  0429  07/09/17  1440   LN-INR  1.13* 1.14* 1.15*  < > 1.01   LN-PARTIAL THROMBOPLASTIN TIME seconds  --   --   --   --  39*   < > = values in this interval not displayed.  Pertinent Radiology     Current Medications.     acetaminophen  1,000 mg Enteral Tube TID    Or     acetaminophen  650 mg Rectal TID     albumin human  25 g Intravenous Q6H     chlorhexidine  15 mL Topical Q12H     gabapentin  100 mg Enteral Tube QHS     insulin aspart (NovoLOG) injection   Subcutaneous Q6H     ipratropium-albuterol  3 mL Nebulization QID - RT     iron sucrose (VENOFER) IVPB  100 mg Intravenous Q24H     levETIRAcetam   (KEPPRA) solution 100 mg/mL  500 mg Enteral Tube BID    Or     levETIRAcetam (KEPPRA) IVPB  500 mg Intravenous BID     methylPREDNISolone sodium succinate  40 mg Intravenous Q6H     omeprazole  20 mg Enteral Tube QHS    Or     omeprazole  20 mg Oral QHS    Or     pantoprazole  40 mg Intravenous QHS     piperacillin-tazobactam (ZOSYN) IV  3.375 g Intravenous Q12H     polyethylene glycol  17 g Oral DAILY     rOPINIRole  2 mg Enteral Tube QHS     senna-docusate  1 tablet Oral BID    Or     senna (SENOKOT) syrup  8.8 mg Enteral Tube BID     sodium chloride  10-30 mL Intravenous Q8H FIXED TIMES     sodium chloride  3 mL Intravenous Line Care     vecuronium (NORCURON) syringe 1 mg/1 mL  0.1 mg/kg Intravenous Once     white petrolatum-mineral oil   Both Eyes Q8H     PRN:acetaminophen, albuterol, aluminum-magnesium hydroxide-simethicone, bacitracin, benzocaine-menthol, bisacodyl, dextrose 50 % (D50W), fentaNYL 1500 mcg/150 mL in NS (10 mcg/ml), fentaNYL pf, glucagon (human recombinant), HYDROmorphone, insulin regular infusion 0.5 unit/mL, lidocaine, lipase-protease-amylase **AND** sodium bicarbonate, loratadine, LORazepam, metoclopramide **OR** metoclopramide **OR** metoclopramide, naloxone **OR** naloxone, ondansetron **OR** ondansetron, oxyCODONE, polyvinyl alcohol, sodium chloride, sodium chloride, sodium chloride, sodium phosphates 133 mL    Chest x-ray 7/15 as noted above

## 2021-06-11 NOTE — PROGRESS NOTES
Pulmonology ICU Daily Progress Note    Assessment & Plan: Jhoana Ramirez is a 83 y.o. female with a past medical history of meningioma, CAD, CKD3 with single kidney, COPD on home O2 2L NC, HTN, and anemia admitted to ICU 7/10/2017 for acute hypoxic respiratory failure post surgical repair of L trochanteric fracture, pinned 7/10/2017.     1. Acute postoperative respiratory failure - resolved; extubated; Speech consulted for swallow evaluation;   2. Trochanteric hip fracture, status post surgical repair - ordered PT; heparin ordered; start  BID when pass swallow eval  3. Aspiration pneumonia - improved, finished Zosyn; stable infiltrate on CXR; am CXR  4. Essential hypertension - start home HTN med when cleared by swallow eval  5. Acute on chronic kidney failure - improved; am CMP;   6. Anemia - Hgb stable, no blood products since 7/11/17  7. Hx of seizure  - neurontin, keppra  8. Urinary tract infection - improved, finished Zosyn;  9. Pleural effusions - resolved    Principal Problem:    Hip fracture  Active Problems:    Acute postoperative respiratory failure    Respiratory failure, post-operative    Pneumonia of right lower lobe due to infectious organism    Single kidney    UTI (urinary tract infection)    Benign essential hypertension    Moderate COPD (chronic obstructive pulmonary disease)    Macrocytic anemia    Acute on chronic kidney failure    Meningioma    Hip fracture, left, closed, with routine healing, subsequent encounter    Diet: NPO until swallow eval  Fluids: D5 at 50mL/hr   VTE Prophylaxis: ELEANOR hose;     Status: inpatient   Length of Stay: 8     Patient dicussed with attending [facility] physician, Dr.Andrew Hayes, who agrees with the plan.     Ruba Fuller PGY1 7/17/2017  [facility]   Pager: [phone] (from 8AM-5:30PM)    Subjective/Interval events:   Ms. Ramirez is awake, lying in bed. She confirms some pain at the surgical site; she is eager to  get out of bed and asking for food. She confirms coughing, and states that she has a productive cough.     ROS: she denies lightheadedness, chest pain, dyspnea;     Objective  Vital signs in last 24 hours  Temp:  [97.1  F (36.2  C)-98.9  F (37.2  C)] 98  F (36.7  C)  Heart Rate:  [] 81  Resp:  [17-36] 28  BP: (146-200)/() 168/86  FiO2 (%):  [25 %-30 %] 30 %  146 lb 6.4 oz (66.4 kg)  Physical Exam  General appearance: appears stated age, cooperative, no distress and mouth-breathing; somnolent; oriented to person, place, and situation  Head: Normocephalic, without obvious abnormality, atraumatic  Throat: lips, mucosa, and tongue normal; teeth and gums normal and mouth-breathing, dry oral mucosa  Neck: supple, symmetrical, trachea midline  Lungs: R lung clear to auscultation; L lower lobe coarse breath sounds; no crackles  Heart: irregularly irregular rhythm and S1, S2 normal  Abdomen: soft, non-tender, normal bowel sounds  Extremities: extremities normal, atraumatic, no cyanosis or edema and ELEANOR hose and foot pumps in place  Pulses: 2+ and symmetric  Skin: surgical dressing on L hip; bandages clean; stable ecchymosis on L lower flank;     Intake/Output last 3 shifts  I/O last 3 completed shifts:  In: 1620 [I.V.:1620]  Out: 6625 [Urine:6325; Stool:300]  Pertinent Labs     Results from last 7 days  Lab Units 07/17/17  0422 07/16/17 0540 07/15/17  0514   LN-WHITE BLOOD CELL COUNT thou/uL 9.2 7.6 5.6   LN-HEMOGLOBIN g/dL 10.0* 9.2* 9.0*   LN-HEMATOCRIT % 29.7* 27.9* 27.3*   LN-PLATELET COUNT thou/uL 121* 103* 101*       Results from last 7 days  Lab Units 07/17/17  0422 07/16/17  0540 07/15/17  0514   LN-SODIUM mmol/L 144  144 149* 146*   LN-POTASSIUM mmol/L 3.9  3.9 3.8 3.8   LN-CHLORIDE mmol/L 97*  97* 107 107   LN-CO2 mmol/L 34*  34* 29 28   LN-BLOOD UREA NITROGEN mg/dL 96*  96* 89* 90*   LN-CREATININE mg/dL 2.18*  2.18* 2.32* 2.94*   LN-CALCIUM mg/dL 9.0  9.0 8.7 8.4*   LN-ALBUMIN g/dL 3.8  3.8  3.9 3.5   LN-PROTEIN TOTAL g/dL 5.7* 5.7* 5.4*   LN-BILIRUBIN TOTAL mg/dL 1.1* 0.8 0.6   LN-ALKALINE PHOSPHATASE U/L 46 49 52   LN-ALT (SGPT) U/L 7 <6 <6   LN-AST (SGOT) U/L 16 16 14       Results from last 7 days  Lab Units 07/17/17  0422 07/16/17  0540 07/15/17  0514   LN-INR  1.14* 1.14* 1.13*     Pertinent Radiology    Xr Chest Ap Portable    Result Date: 7/17/2017  XR CHEST AP PORTABLE 7/17/2017 5:02 AM INDICATION: resp fail COMPARISON: Yesterday. FINDINGS: Left PICC tip in the superior vena cava. Persistent infiltrate at the left base. Lungs appear hyperexpanded consistent with emphysema. Normal heart size. No pneumothorax. Skeleton is demineralized.    Current Medications.     acetaminophen  1,000 mg Oral TID    Or     acetaminophen  650 mg Rectal TID     gabapentin  100 mg Oral QHS     insulin aspart (NovoLOG) injection   Subcutaneous Q6H     ipratropium-albuterol  3 mL Nebulization QID - RT     levETIRAcetam  500 mg Oral BID     methylPREDNISolone sodium succinate  40 mg Intravenous Q6H     omeprazole  20 mg Oral QHS     polyethylene glycol  17 g Oral DAILY     rOPINIRole  2 mg Oral QHS     senna-docusate  1 tablet Oral BID     sodium chloride  10-30 mL Intravenous Q8H FIXED TIMES     sodium chloride  3 mL Intravenous Line Care     white petrolatum-mineral oil   Both Eyes Q8H     PRN:acetaminophen, albuterol, aluminum-magnesium hydroxide-simethicone, bacitracin, benzocaine-menthol, bisacodyl, dextrose 50 % (D50W), fentaNYL pf, glucagon (human recombinant), hydrALAZINE, HYDROmorphone, insulin regular infusion 0.5 unit/mL, labetalol, lidocaine, loratadine, LORazepam, metoclopramide **OR** metoclopramide **OR** metoclopramide, naloxone **OR** naloxone, ondansetron **OR** ondansetron, oxyCODONE, polyvinyl alcohol, sodium chloride, sodium chloride, sodium chloride    This note was created with help of Dragon dictation system. Grammatical /typing errors are not intentional.

## 2021-06-11 NOTE — PROGRESS NOTES
Patient started shift on BiPAP  ST 12/5 14 30% ABG drawn by day shift results 7.50 44 65 32.7 97.3. sats 98% HR 92 RR 19-23. Breath sounds diminished both pre and post neb tx. Nebs given x 2. Decreased 02 to 25%. Patient now on 2 lpm NC sating 100%, BiPAP on stand by. Continue to monitor.

## 2021-06-11 NOTE — PROGRESS NOTES
PULMONARY / CRITICAL CARE PROGRESS NOTE    Date / Time of Hospital Admission:  7/9/2017  1:50 PM    ID:  Jhoana Ramirez is a 83 y.o. female with underlying chronic kidney disease coronary disease COPD with chronic hypoxic respiratory failure requiring 2 L of nasal cannula at home she has hypertension and meningioma history of a stroke and is now status post left hip pinning on 7/10.  Postextubation her gas was pH of 6.99 PCO2 of 114 that did improve.  The patient remains on the ventilator however and has been producing copious amounts of thin bloody secretions consistent with CHF she is 4 L positive she also has had a few mucous plugs she is on bronchodilators at this point in time we have added DuoNeb and Mucomyst she remains on steroids CT scan of the chest showed no pneumothorax there is some atelectasis with small effusions at the bases she has been started on Zosyn and echocardiogram is pending at this point in time she continues to have hyperkalemia she has been given Lasix as her eyes are 4 L greater than or O's and other labs include a BNP and a magnesium have been sent as well.  IV fluids have been placed TKO     Assessment:   1. Postop respiratory failure patient continues to have issues with weaning she has dropped her sats a couple times as well as had episodes of apnea is also had mucus plugging    Continue to wean sedation as tolerated i.e. Precedex and fentanyl  Bronchodilators steroids and antibiotics started on Zosyn  Mucomyst with bronchodilators  CT of the chest without evidence of pneumothorax minimal infiltrates versus atelectasis at the bases bilaterally with small fluid effusions    2 cardiovascular not requiring any pressors at this point in time she does have a history of coronary disease have asked for an echocardiogram to evaluate postoperative function    #3 anemia patient did require 1 unit of packed red blood cells last night she is on aspirin and heparin for prophylaxis post  surgically and also has SCDs at this point in time.  Iron studies are pending repeat CBC is pending    #4 chronic kidney disease patient with an elevated potassium was coming down it was 5.9 and then 5.6 we will repeat a BMP today this morning to make sure he continues to come down  Patient is fluid long and is been started on Lasix    #5 nutrition patient does not have a G-tube at this point in time if we are not able to liters short course reasonably short period of time this today will place a G-tube in and start home meds and have been started and nutrition    #6 fentanyl for pain control patient's also sedated and is on Precedex as well.    #8 patient with DVT and GI prophylaxis    #9 non-gap acidosis secondary to hyperchloremia have given 1 amp of bicarbonate IV fluids TKO will continue to diurese    Advance Directives:  <no information>      Plan:   Systems to Assess:     Pulmonary: Wean supplemental O2 as tolerated; goal O2 sat > 92%.  HOB > 30 degrees to limit aspiration risk.          Cardiovascular: Cardiac monitoring.     SBP > 90 mmHg, MAP > 65 mmHg.      Neurological: Neuro checks per ICU protocol.         Pain control:     GI/:     GI prophylaxis:      Renal: Monitor I/O's.  Electrolyte repletion PRN.  Avoid/limit nephrotoxic agents.     IVFs: Heme/Coag: Monitor H/H.     DVT prophylaxis:      Infectious disease: General precautions.     Endocrine: FSBG Q4H, Aspart insulin SS ().     Musculoskeletal:     Lines: piv    Activity: Bed Rest    Code Status:  dnr    The patient and/or the family was educated about the above plan of care and indicated understanding.      This patient is considered critically ill and requires ICU level of care due to postoperative respiratory failure.    Total Critical Care time, not including separate billable procedure time: 55  minutes.    Wellington Viveros MD   Pulmonary & Critical Care Medicine  7/11/2017  10:36 AM       ICU Checklist:   ICU DAILY CHECKLIST                          Can patient transfer out of MICU? no    FAST HUG:    Feeding:  Feeding: No.  Patient is receiving NPO    Milner:Yes  Analgesia/Sedation:Yes fentanyl  Thromboembolic prophylaxis: yes; Mode:  HOB :  Yes and No  Stress Ulcer Protocol Active: yes; Mode: PPI  Glycemic Control: Any glucose > 180 yes; Mode of Insulin Therapy: Sliding Scale Insulin    INTUBATED:  Can patient have daily waking:  yes  Can patient have spontaneous breathing trial:  yes    Restraints? yes    PHYSICAL THERAPY AND MOBILITY:  Can patient have PT and mobility trial: no  Activity: Bed Rest     Subjective:       Problem List: Principal Problem:    Hip fracture  Active Problems:    Hypertension    Chronic Obstructive Pulmonary Disease    Macrocytic anemia    Pneumothorax on right    Acute postoperative respiratory failure      ALLERGIES: Corticosteroids (glucocorticoids); Cortisone acetate; and Hydroxyzine hcl    MEDS:  Reviewed.  Active include:      acetaminophen  1,000 mg Oral TID    Or     acetaminophen  650 mg Rectal TID     acetylcysteine (MUCOMYST) 20% inhalation solution  5 mL Inhalation Q4H - RT     aspirin  325 mg Oral BID    Or     aspirin  300 mg Rectal BID     budesonide  0.5 mg Nebulization BID - RT     chlorhexidine  15 mL Topical Q12H     formoterol fumarate  20 mcg Nebulization Q12H     furosemide  60 mg Intravenous Once     gabapentin  100 mg Oral QHS     insulin aspart (NovoLOG) injection   Subcutaneous Q4H FIXED TIMES     ipratropium-albuterol  3 mL Nebulization Q4H - RT     levETIRAcetam (KEPPRA) IVPB  500 mg Intravenous Q12H     methylPREDNISolone sodium succinate  60 mg Intravenous Q6H     oxyCODONE  5 mg Oral Q6H     pantoprazole  40 mg Intravenous Q24H     piperacillin-tazobactam (ZOSYN) IV  3.375 g Intravenous Q8H     polyethylene glycol  17 g Oral DAILY     [MAR Hold] rOPINIRole  2 mg Oral QHS     senna-docusate  1 tablet Oral BID    Or     senna (SENOKOT) syrup  8.8 mg Enteral Tube BID     sodium bicarbonate  50 mEq  "Intravenous Once     [MAR Hold] sodium chloride  3 mL Intravenous Line Care     sodium chloride  3 mL Intravenous Line Care     Continuous Infusions:    dexmedetomidine 400 mcg/100 mL in NS (PRECEDEX) (4mcg/mL) 1.4 mcg/kg/hr (07/11/17 1034)     fentaNYL 1500 mcg/150 mL in NS (10 mcg/ml) 100 mcg/hr (07/11/17 0409)     insulin regular infusion 0.5 unit/mL       nacl 0.9% 100 mL/hr (07/11/17 0902)        Objective:   VITALS:  /59  Pulse 62  Temp 97.6  F (36.4  C) (Oral)   Resp 17  Ht 5' 6\" (1.676 m)  Wt 149 lb 1.6 oz (67.6 kg)  SpO2 100%  BMI 24.07 kg/m2  Temp:  [97.4  F (36.3  C)-98.9  F (37.2  C)] 97.6  F (36.4  C)  Heart Rate:  [53-92] 62  Resp:  [5-41] 17  BP: ()/(45-68) 132/59  SpO2:  [89 %-100 %] 100 %  ETCO2 (mmHg):  [0 mmHg-52 mmHg] 0 mmHg  FiO2 (%):  [30 %-100 %] 100 %    PHYSICAL EXAM:    GEN: Patient's awake moving all extremities has an ET tube in place  HEENT: Normocephalic, atraumatic.  Extraoccular eye movements intact, anicteric sclera. No sinus tenderness to palpation.  Moist mucous membranes.  ET tube and NG tube are in place  NECK: Supple.  No elevated neck veins bruits or thyromegaly  PULM: Non-labored breathing.  No use of accessory muscles.  Crackles and wheezes bilaterally   CVS: Regular rate and rhythm.  Normal S1, S2.  No rubs, murmurs, or gallops.   ABDOMEN: Normoactive bowel sounds.  Non-tender to palpation.  Non-distended.  No rebound guarding flank or periumbilical ecchymosis  EXTREMITES:  No clubbing, cyanosis, or edema.  Left hip with dressing in place  NEURO: Sedated on ventilator but easily arousable is nonfocal  I&O:    Intake/Output Summary (Last 24 hours) at 07/11/17 1036  Last data filed at 07/11/17 0517   Gross per 24 hour   Intake             4549 ml   Output              300 ml   Net             4249 ml       PERTINENT STUDIES:  Serum Glucose range:No results for input(s): POCGLU in the last 72 hours.  ABG:  Recent Labs      07/10/17   1834   07/11/17   0005 "   PHART  7.08*   < >  7.29*   THA8UJP  81*   < >  42   PO2ART  48*   < >  67*   OXYHB  81.7*   < >  93.4*   BEARTCALC  -6.6   < >  -6.0   POCPEEP  6   < >  5   TEMP  37.0   < >  37.0   POCRATE  18   < >  16   PSV  14   --    --     < > = values in this interval not displayed.     CBC:    Results from last 7 days  Lab Units 07/11/17  0423 07/10/17  2137 07/10/17  1605 07/10/17  0314 07/09/17  1440   LN-WHITE BLOOD CELL COUNT thou/uL 4.1  --  7.6 5.4  5.4 5.9   LN-HEMOGLOBIN g/dL 7.8* 8.1* 9.7* 8.4*  8.4* 9.1*   LN-HEMATOCRIT % 24.9*  --  31.9* 27.7*  27.7* 28.9*   LN-PLATELET COUNT thou/uL 105*  --  140 125*  125* 132*   LN-NEUTROPHILS RELATIVE PERCENT %  --   --   --  82*  82* 85*   LN-MONOCYTES RELATIVE PERCENT %  --   --   --  8  8 7     Chemistry:    Results from last 7 days  Lab Units 07/11/17  0423 07/10/17  2137 07/10/17  0314 07/09/17  1440   LN-SODIUM mmol/L  --  141 143 142   LN-POTASSIUM mmol/L 5.6* 5.9* 5.4* 5.3*   LN-CHLORIDE mmol/L  --  112* 108* 107   LN-CO2 mmol/L  --  19* 25 27   LN-BLOOD UREA NITROGEN mg/dL  --  31* 22 19   LN-CREATININE mg/dL  --  2.02* 1.73* 1.66*   LN-CALCIUM mg/dL  --  7.6* 8.1* 8.4*   LN-MAGNESIUM mg/dL 1.6*  --  2.0 1.3*   LN-ALBUMIN g/dL  --   --   --  3.1*   LN-ALT (SGPT) U/L  --   --   --  <6   LN-AST (SGOT) U/L  --   --   --  12   LN-ALKALINE PHOSPHATASE U/L  --   --   --  77   LN-BILIRUBIN TOTAL mg/dL  --   --   --  0.4     Coags:  Lab Results   Component Value Date    INR 1.24 (H) 07/11/2017    INR 1.01 07/09/2017    INR 1.09 01/30/2016     Cardiac Markers:    Results from last 7 days  Lab Units 07/10/17  0314  07/09/17  1440   LN-CREATINE KINASE TOTAL U/L  --   --  41   LN-TROPONIN I ng/mL <0.01  < > 0.01   < > = values in this interval not displayed.     Microbiology:    Pending sputum Gram stain C&S        RADIOLOGY/IMAGING:    Chest X-Ray: CT scan of the chest reviewed and noted as above as well as chest x-ray ET tube is in good position there is no  G-tube    Outside reports reviewed: ER records, Historical medical records, Lab reports, Operative reports and Radiology reports.

## 2021-06-11 NOTE — PROGRESS NOTES
Pt accepted at [facility] for today. HE stretcher transport set for 1700 due to O2. Did update pt's son. Orders to be sent by [facility].

## 2021-06-11 NOTE — PROGRESS NOTES
"Vent Mode: VCV  FiO2 (%):  [45 %-100 %] 45 %  S RR:  [18] 18  S VT:  [450 mL] 450 mL  PEEP/CPAP (cm H2O):  [5 cm H2O] 5 cm H2O  Minute Ventilation (L/min):  [6.9 L/min-8.3 L/min] 6.9 L/min  PIP:  [24 cm H2O-39 cm H2O] 38 cm H2O  NE SUP:  [10 cm H20] 10 cm H20  MAP (cm H2O):  [9-12] 12    Patient remains intubated and ventilated on above settings. No changes in respiratory care. RT following.    BP (!) 87/54 (Patient Position: Semi-montaño)  Pulse 100  Temp 98.2  F (36.8  C) (Oral)   Resp 18  Ht 5' 6\" (1.676 m)  Wt 153 lb 9.6 oz (69.7 kg)  SpO2 100%  BMI 24.79 kg/m2    "

## 2021-06-11 NOTE — PROGRESS NOTES
"  RESPIRATORY CARE NOTE     Patient Name: Jhoana Ramirez  Today's Date: 7/15/2017       /89  Pulse 96  Temp 98.7  F (37.1  C) (Oral)   Resp 26  Ht 5' 6\" (1.676 m)  Wt 153 lb 9.6 oz (69.7 kg)  SpO2 96%  BMI 24.79 kg/m2     Vent Mode: VCV  FiO2 (%):  [30 %-40 %] 30 %  S RR:  [18] 18  S VT:  [450 mL] 450 mL  PEEP/CPAP (cm H2O):  [5 cm H2O] 5 cm H2O  Minute Ventilation (L/min):  [7.5 L/min-9.3 L/min] 9.3 L/min  PIP:  [14 cm H2O-44 cm H2O] 44 cm H2O  ME SUP:  [5 cm H20-8 cm H20] 5 cm H20  MAP (cm H2O):  [7-11] 11    Pt weaned on CPAP/PS 5/5 for 4 hrs and 6 mints, tolerated well and later placed on full vent support for the rest of the night with the above settings.  RT will resume weaning in AM and monitor progress.     Jarad Bell, LRT      "

## 2021-06-11 NOTE — PROGRESS NOTES
07/13/17 0530   Vitals   Heart Rate 100   Resp 18   BP (!) 88/52   MAP (mmHg) (Calculated) 64   Noninvasive BP (Mean) 65   Oxygen Therapy/Pulse Ox   SpO2 100 %   FiO2 (%) 45 %   Patient is in Afib with HR in 90's to low 100's. SBP < 90, MAP between 63 to 65. Patient with low urine out put of 200 ml. Dr. Roper notified, no new orders. Patient is alert and responsive to stimulation and open eyes sound. Will continue to monitor patient.

## 2021-06-11 NOTE — PROGRESS NOTES
"RENAL PROGRESS NOTE     CC: FRANK follow up    ROS: There were no acute events overnight. Today, she denies fever, dizziness, nausea/vomiting and chest pain. She feels thirsty.     Assessment and Plan:    1. Acute kidney injury. Likely hemodynamic shifts perioperatively causing ATN. Good urine output with creatinine trending down to near baseline.  She did not require dialysis.  Continue to monitor.  2. CKD stage 4: Baseline creatinine of ~ 2 mg/dL due to acquired absence of right kidney (Removed decades ago - reasons unclear.)  3. Hypertension.  Some low readings. No changes trend. Restart amlodipine 5mg/dayif BP increases.     4. Acute Hypernatremia. RESOLVED. Trend and encourage oral intake.  5. Anemia: Secondary to CKD. Improved and receiving Venofer course due to iron deficiency.   6. Hyperkalema: Normalized after high dose diuretics, hold for now and trend.  7. Acute Respiratory failure: Off vent and BiPAP. Thought secondary to pneumonia/COPD. No fluid overload on 7/17 CXR which was personally reviewed.  Lasix stopped given evolving metabolic alkalosis and weight down since admission (Weights likely inaccurate given wide variation).    8. Metabolic alkalosis: Lasix stopped and IVF started. This is in the setting of hypovolemia.     Yash Toth MD  [facility]  Pager: [phone]   Office: [phone]         PHYSICAL EXAM  Vitals:    07/19/17 0749   BP: 93/59   Pulse: 80   Resp: 18   Temp: 97.6  F (36.4  C)   SpO2: 92%     BP 93/59  Pulse 80  Temp 97.6  F (36.4  C) (Oral)   Resp 18  Ht 5' 6\" (1.676 m)  Wt 146 lb 6.4 oz (66.4 kg)  SpO2 92%  BMI 23.63 kg/m2      Intake/Output Summary (Last 24 hours) at 07/19/17 1003  Last data filed at 07/19/17 0233   Gross per 24 hour   Intake               30 ml   Output                0 ml   Net               30 ml        Wt Readings from Last 3 Encounters:   07/17/17 146 lb 6.4 oz (66.4 kg)   07/11/16 163 lb (73.9 kg)   02/22/16 144 lb 12 oz " (65.7 kg)       Physical Exam:   GENERAL: calm and comfortable, alert  EYES: No scleral icteruas, conjunctiva clear  ENT: Hearing normal, Oral mucosa dry  RESP: Decreased air movement bilaterally. No rales. Normal effort.  CV: RRR, no murmurs. No leg edema.    GI: Active BS, Soft, NT/ND, no masses or HSM  Musculoskeletal: Normal muscle bulk/ tone; left hip tender with movement.   SKIN: No rash, warm/ dry  PSYCH:  normal mood and affect but poor recent recollection.  Lymph: No cervical/ inguinal adenopathy      LABORATORIES    Results from last 7 days  Lab Units 07/17/17  0422 07/16/17  0540 07/15/17  0514   LN-WHITE BLOOD CELL COUNT thou/uL 9.2 7.6 5.6   LN-HEMOGLOBIN g/dL 10.0* 9.2* 9.0*   LN-HEMATOCRIT % 29.7* 27.9* 27.3*   LN-PLATELET COUNT thou/uL 121* 103* 101*       Results from last 7 days  Lab Units 07/19/17  0925 07/18/17  0618 07/17/17  0422 07/16/17  0540 07/15/17  0514   LN-SODIUM mmol/L  --  142 144  144 149* 146*   LN-POTASSIUM mmol/L 3.8 3.6 3.9  3.9 3.8 3.8   LN-CHLORIDE mmol/L  --  94* 97*  97* 107 107   LN-CO2 mmol/L  --  34* 34*  34* 29 28   LN-BLOOD UREA NITROGEN mg/dL  --  98* 96*  96* 89* 90*   LN-CREATININE mg/dL  --  2.01* 2.18*  2.18* 2.32* 2.94*   LN-CALCIUM mg/dL  --  8.8 9.0  9.0 8.7 8.4*   LN-PROTEIN TOTAL g/dL  --   --  5.7* 5.7* 5.4*   LN-BILIRUBIN TOTAL mg/dL  --   --  1.1* 0.8 0.6   LN-ALKALINE PHOSPHATASE U/L  --   --  46 49 52   LN-ALT (SGPT) U/L  --   --  7 <6 <6   LN-AST (SGOT) U/L  --   --  16 16 14       Results from last 7 days  Lab Units 07/17/17  0422   LN-INR  1.14*           Results from last 7 days  Lab Units 07/19/17  0925   LN-MAGNESIUM mg/dL 2.0       RADIOLOGY REPORTS    ECHOCARDIOGRAM    MEDICATIONS    acetaminophen  1,000 mg Oral TID     aspirin  81 mg Oral DAILY     gabapentin  100 mg Oral QHS     heparin (PF)  5,000 Units Subcutaneous Q8H FIXED TIMES     levETIRAcetam  500 mg Oral BID     polyethylene glycol  17 g Oral DAILY     predniSONE  40 mg Oral Daily  with brkt     senna-docusate  1 tablet Oral BID     sodium chloride  10-30 mL Intravenous Q8H FIXED TIMES     sodium chloride  3 mL Intravenous Line Care     white petrolatum-mineral oil   Both Eyes Q8H     acetaminophen, albuterol, aluminum-magnesium hydroxide-simethicone, bacitracin, benzocaine-menthol, bisacodyl, HYDROmorphone, ipratropium-albuterol **AND** [] Nebulizer treatment intermittent, labetalol, lidocaine, loratadine, LORazepam, metoclopramide **OR** [DISCONTINUED] metoclopramide **OR** [DISCONTINUED] metoclopramide, naloxone **OR** naloxone, ondansetron **OR** ondansetron, oxyCODONE, polyvinyl alcohol, sodium chloride, sodium chloride, sodium chloride         Above laboratories and medications were personally reviewed during evaluation today.

## 2021-06-11 NOTE — PROGRESS NOTES
She is seen and examined with family medicine resident.  Exam findings confirmed.  Plan of care was formulated under my direct supervision.  All appropriate labs x-rays and other pertinent data were reviewed.  Patient remains critically ill critical care time today is 40 minutes

## 2021-06-11 NOTE — PROGRESS NOTES
Pulmonology ICU Daily Progress Note    Assessment & Plan: Jhoana Ramirez is a 83 y.o. female with a past medical history of COPD with chronic hypoxic respiratory failure, CAD, HTN, CKD 3 and history of stroke; she was admitted to ICU for acute postoperative respiratory failure 7/10/2017.     1. Trochanteric hip fracture, status post surgical repair 7/10/2017 - orthopedics following  2. Acute postoperative respiratory failure - failed wean this a.m.; repeat ABG today; retrying weans; differentials include over sedation, sub-acute stroke, status epilepticus, primary central sleep apnea, hypercarbic respiratory drive;   CT head today; consider EEG and/or Keppra level;   3. Pneumonia - likely aspiration; per CT and clinical findings; continue zosyn x 5 days  4. Urinary Tract Infection - pan-sensitive E.coli on culture; covered by zosyn  5. Acute on Chronic Kidney Failure - BUN and Cr rising; diuresing, increased Lasix and added HCTZ; checking CMP in am;  6. Anemia - Hgb improved; checking Hgb q8h  7. History of Seizure Disorder - per [facility] records; calc CrCl 20mL/min, dosing 500mg BID IV appropriate  8. COPD - on vent, RT consulted  9. Pleural effusions - on CT; diuresing    Principal Problem:    Hip fracture  Active Problems:    Acute postoperative respiratory failure    Respiratory failure, post-operative    Pneumonia of right lower lobe due to infectious organism    UTI (urinary tract infection)    Benign essential hypertension    Moderate COPD (chronic obstructive pulmonary disease)    Macrocytic anemia    Acute on chronic kidney failure    Diet: OG in place   Fluids: normal saline 25mL/hr   VTE Prophylaxis: SCDs    Barriers to discharge: failing vent wean  Status: inpatient   Length of Stay: 3     Patient dicussed with attending ICU physician, , who agrees with the plan.     Ruba Fuller PGY1 7/12/2017  [facility]   Pager: [phone] (from  8AM-5:30PM)    Subjective/Interval events:  Failed vent wean this morning 7/12/2017 due to apnea and low O2 saturation. RT noted bloody mucus plug and white, frothy secretions.     ROS: intubated and sedated    Objective  Vital signs in last 24 hours  Temp:  [96.5  F (35.8  C)-98  F (36.7  C)] 96.7  F (35.9  C)  Heart Rate:  [49-86] 78  Resp:  [16-40] 18  BP: ()/(50-71) 132/63  FiO2 (%):  [45 %] 45 %  152 lb 9.6 oz (69.2 kg)  Physical Exam  General appearance: appears stated age and sedated on vent  Head: Normocephalic, without obvious abnormality, atraumatic, edentulous  Eyes: negative findings: lids and lashes normal and conjunctivae and sclerae normal, pupils mitotic, equal  Neck: supple, symmetrical, trachea midline  Lungs: clear to auscultation bilaterally  Heart: regular rate and rhythm, S1, S2 normal and heart sounds distant  Abdomen: normal findings: bowel sounds normal, no bruits heard, no masses palpable, no organomegaly, symmetric and soft and abnormal findings:  patient stirs when palpating right of umbilicus, right flank  Extremities: no edema; ecchymosis over thenar eminence of L hand; two gold rings with white stones on L 4th finger; feet pale, cool; compression hose in place  Pulses: 2+ and symmetric  Skin: Skin color, texture, turgor normal. No rashes or lesions ; surgical site on L hip, two dressings, one proximal, one distal, both clean; some blood on distal dressing; sparse ecchymosis scatter on intertriginous area of L abdomen; yellowing ecchymosis on lateral aspect of L tibia; scant scattered ecchymosis around promixal wound dressing;     Intake/Output last 3 shifts  I/O last 3 completed shifts:  In: 3490.9 [I.V.:2520.9; Blood:350; NG/GT:120; IV Piggyback:500]  Out: 2600 [Urine:2600]  Pertinent Labs     Results from last 7 days  Lab Units 07/12/17  0454 07/11/17  1052 07/11/17  0423   LN-WHITE BLOOD CELL COUNT thou/uL 3.8* 3.3* 4.1   LN-HEMOGLOBIN g/dL 9.3* 7.3* 7.8*   LN-HEMATOCRIT %  26.9* 22.9* 24.9*   LN-PLATELET COUNT thou/uL 113* 108* 105*       Results from last 7 days  Lab Units 07/12/17  0454 07/11/17  2016 07/11/17  1052  07/09/17  1440   LN-SODIUM mmol/L 144 142 143  < > 142   LN-POTASSIUM mmol/L 4.7 4.8 5.6*  < > 5.3*   LN-CHLORIDE mmol/L 110* 111* 115*  < > 107   LN-CO2 mmol/L 22 20* 18*  < > 27   LN-BLOOD UREA NITROGEN mg/dL 45* 42* 37*  < > 19   LN-CREATININE mg/dL 2.35* 2.21* 2.04*  < > 1.66*   LN-CALCIUM mg/dL 8.2* 8.1* 7.6*  < > 8.4*   LN-ALBUMIN g/dL 2.6*  --   --   --  3.1*   LN-PROTEIN TOTAL g/dL 4.8*  --   --   --  5.8*   LN-BILIRUBIN TOTAL mg/dL 0.5  --   --   --  0.4   LN-ALKALINE PHOSPHATASE U/L 53  --   --   --  77   LN-ALT (SGPT) U/L <6  --   --   --  <6   LN-AST (SGOT) U/L 10  --   --   --  12   < > = values in this interval not displayed.    Results from last 7 days  Lab Units 07/12/17 0454 07/11/17 0423 07/09/17  1440   LN-INR  1.17* 1.24* 1.01   LN-PARTIAL THROMBOPLASTIN TIME seconds  --   --  39*     Pertinent Radiology  Xr Chest Ap Portable    Result Date: 7/12/2017  XR CHEST AP PORTABLE 7/12/2017 5:18 AM  FINDINGS: New left PICC line in good position. ET tube and NG tube also in good position. Mild infiltrate persists at left lung base, lungs otherwise clear, no pneumothorax. Heart size normal.    Xr Hip Left 2 Or More Vws    Ct Chest Without Contrast    Result Date: 7/11/2017  CT CHEST WO CONTRAST 7/11/2017 9:58 AM  CONCLUSION:   1.  No pneumothorax. The appearance on chest x-ray due to skin folds.   2.  ET tube in good position.   3.  Tiny bilateral pleural effusions with mild atelectasis both lung bases.    Current Medications.     acetaminophen  1,000 mg Enteral Tube TID    Or     acetaminophen  650 mg Rectal TID     acetylcysteine (MUCOMYST) 20% inhalation solution  5 mL Inhalation Q4H - RT     budesonide  0.5 mg Nebulization BID - RT     chlorhexidine  15 mL Topical Q12H     chlorothiazide  500 mg Enteral Tube DAILY     formoterol fumarate  20 mcg  Nebulization Q12H     furosemide  80 mg Intravenous Q8H     gabapentin  100 mg Enteral Tube QHS     insulin aspart (NovoLOG) injection   Subcutaneous Q6H     ipratropium-albuterol  3 mL Nebulization Q4H - RT     iron sucrose (VENOFER) IVPB  100 mg Intravenous Q24H     levETIRAcetam  (KEPPRA) solution 100 mg/mL  500 mg Enteral Tube BID    Or     levETIRAcetam (KEPPRA) IVPB  500 mg Intravenous BID     methylPREDNISolone sodium succinate  60 mg Intravenous Q6H     omeprazole  20 mg Enteral Tube QHS    Or     omeprazole  20 mg Oral QHS    Or     pantoprazole  40 mg Intravenous QHS     piperacillin-tazobactam (ZOSYN) IV  3.375 g Intravenous Q12H     polyethylene glycol  17 g Oral DAILY     rOPINIRole  2 mg Enteral Tube QHS     senna-docusate  1 tablet Oral BID    Or     senna (SENOKOT) syrup  8.8 mg Enteral Tube BID     sodium chloride  10-30 mL Intravenous Q8H FIXED TIMES     sodium chloride  3 mL Intravenous Line Care     sodium polystyrene sulfonate  30 g Enteral Tube Once     vecuronium (NORCURON) syringe 1 mg/1 mL  0.1 mg/kg Intravenous Once     viscous lidocaine HC  15 mL Oral Once     white petrolatum-mineral oil   Both Eyes Q8H     PRN:acetaminophen, albuterol, aluminum-magnesium hydroxide-simethicone, bacitracin, benzocaine-menthol, bisacodyl, dextrose 50 % (D50W), fentaNYL 1500 mcg/150 mL in NS (10 mcg/ml), fentaNYL pf, glucagon (human recombinant), HYDROmorphone, insulin regular infusion 0.5 unit/mL, lidocaine, lipase-protease-amylase **AND** sodium bicarbonate, loratadine, LORazepam, metoclopramide **OR** metoclopramide **OR** metoclopramide, naloxone **OR** naloxone, ondansetron **OR** ondansetron, oxyCODONE, polyvinyl alcohol, sodium chloride, sodium chloride, sodium chloride, sodium phosphates 133 mL    This note was created with help of Dragon dictation system. Grammatical /typing errors are not intentional.     Patient was seen and examined with medical resident.  All appropriate and pertinent data was  reviewed including x-rays and not limited to labs etc. plan of care was formulated under my direct supervision  Critical care time today is 45 minutes  Wellington Viveros MD pulmonary critical care medicine

## 2021-06-11 NOTE — PROGRESS NOTES
I discussed the patient with Dr. Fuller, reviewed the relevant imaging studies and labs, discussed the case with the patient's nurse, and met with and examined the patient.  I agree with Dr. Fuller's documentation.  Restarting antihypertensives.  ST eval pending.  Possible transfer out later today or tomorrow if remains clinically stable.  Patient is DNR.  Clinically stable to transfer to telemetry status.  Critical care service will sign off but please call any time if needed.    Critical care time: 40 minutes    Grabiel Hayes MD  Pulmonary and Critical Care Medicine  [facility]  Cell [phone]  Office [phone]  Pager [phone]

## 2021-06-11 NOTE — PROGRESS NOTES
Patient started shift on 2 lpm NC, but is now back on BiPAP due to low sats. Settings ST 12/5 14 30% SATS 94% HR 72 RR 17. Breath sounds diminished. Continue to monitor.

## 2021-06-12 NOTE — PROGRESS NOTES
[facility]      Facility:    [facility] [[id number]]    Code Status: DNR/DNI      Chief Complaint/Reason for Visit:  Chief Complaint   Patient presents with     H & P     fall with hip fracture       HPI:   Jhoana is a 83 y.o. female who had an unwotnessed fall at home and was found to have a displaced fracture through the left greater trochanter. There is involvement of the intertrochanteric region with a nondisplaced component to the fracture extending into the intertrochanteric region to the base of the lesser trochanter.    She was seen by orthopedics ( Dr Selwyn Graham) , and had internal fixation of the fractures with 10 pins on 7/101/17.  Pain was controlled with Tylenol, fentanyl, gabapentin.  Aspirin 81 mg for DVT prophylaxis   Postoperatively she developed respiratory failure and needed to be reintubated in the PACU.  She was admitted to the ICU and remained intubated for several days.  They had difficulty extubating her, but she was successfully extubated on 7/16/17.  with brief periods of BiPAP .Her home oxygen requirements were 6L/min 24 hours a day. She completed 5 days of Zosyn for aspiration pneumonia.  She was seen by speech and language pathology , but was discharged on a regular diet with thin liquids at discharge    Other issues:  Chronic kidney disease stage IV, having had acute kidney injury while in ICU uni-nephric  Essential hypertension, although her amlodipine was held during the hospital for relatively low blood pressures.  At home she had been on 10 mg amlodipine daily, but if needed they recommended restarting at 5 mg daily per nephrologist following in the hospital  She has meningioma, has chronically been on Keppra.  When she was hospitalized for this in 2016, family decided on the nonoperative approach    Past Medical History:  Past Medical History:   Diagnosis Date     Acute on chronic kidney failure      CAD (coronary artery disease)      Cataract       Cerebral edema      COPD (chronic obstructive pulmonary disease)      Depression      Encephalopathy      Hypertension      Meningioma      Myocardial infarction      Osteoarthritis      Stroke            Surgical History:  Past Surgical History:   Procedure Laterality Date     CATARACT EXTRACTION       CHOLECYSTECTOMY       HIP PINNING Left 7/10/2017    Procedure: INTERNAL FIXATION, LEFT FRACTURE, HIP, WITH TFN PINS ;  Surgeon: Selwyn Graham MD;  Location: [facility];  Service:      NEPHRECTOMY      Left      PARTIAL KNEE ARTHROPLASTY      Left     PICC  1/29/2016          PICC AND MIDLINE TEAM LINE INSERTION  7/11/2017          PICC AND MIDLINE TEAM LINE INSERTION  7/22/2017            Family History:   No family history on file.    Social History:    Social History     Social History     Marital status:      Spouse name: N/A     Number of children: N/A     Years of education: N/A     Social History Main Topics     Smoking status: Former Smoker     Types: Cigarettes     Smokeless tobacco: Never Used      Comment: States she quit smoking 3 weeks ago on 7/11/16     Alcohol use No     Drug use: No     Sexual activity: No     Other Topics Concern     Not on file     Social History Narrative          Review of Systems   States pain is not adequately controlled, it only takes the edge off for her, and it does not last very long.  Is currently on a as needed basis  Feels mildly  constipated.  She has not had any restless legs syndrome recently.      Blood pressure 96/60, pulse 87, temperature 98.3  F (36.8  C), resp. rate 20, SpO2 96 %, not currently breastfeeding.        Physical Exam   Constitutional: No distress.   Sitting in a wheelchair after completing lunch   HENT:   Right Ear: External ear normal.   Left Ear: External ear normal.   Mouth/Throat: Oropharynx is clear and moist.   Eyes: Conjunctivae and EOM are normal. No scleral icterus.   Cardiovascular: Regular rhythm and normal heart  sounds.    No murmur heard.  Pulmonary/Chest:   Breath sounds are decreased laterally  No wheezes at the present time   Abdominal: Soft. Bowel sounds are normal. She exhibits no distension. There is no tenderness.   Musculoskeletal: She exhibits tenderness. She exhibits no edema.   Assistance of 2 to move around in bed, transfers  Left hip/thigh dressing intact: no bloody discharge, no erythema   Lymphadenopathy:     She has no cervical adenopathy.   Neurological: She is alert.   Guarding of the left hip and leg   Skin: Skin is warm and dry.   Red area on coccyx  Right antecubital bruise, bruise on left volar wrist, left knee.  Left knee scar old surgical, left nephrectomy surgical incision   Psychiatric: She has a normal mood and affect. Her behavior is normal.   Fairly quiet, but briefly answered questions       Medication List:  Current Outpatient Prescriptions   Medication Sig     albuterol (PROVENTIL HFA;VENTOLIN HFA) 90 mcg/actuation inhaler Inhale 1-2 puffs every 4 (four) hours as needed.      aspirin 81 MG EC tablet Take 1 tablet (81 mg total) by mouth daily.     budesonide-formoterol (SYMBICORT) 80-4.5 mcg/actuation inhaler Inhale 2 puffs 2 (two) times a day.     cyclobenzaprine (FLEXERIL) 5 MG tablet Take 1 tablet (5 mg total) by mouth 3 (three) times a day as needed for muscle spasms.     ferrous sulfate 325 (65 FE) MG tablet Take 1 tablet (325 mg total) by mouth daily with breakfast.     levETIRAcetam (KEPPRA) 500 MG tablet Take 500 mg by mouth 2 (two) times a day.     melatonin 3 mg Tab tablet Take 6 mg by mouth at bedtime.     omeprazole (PRILOSEC) 20 MG capsule Take 20 mg by mouth daily.     oxyCODONE (ROXICODONE) 10 mg immediate release tablet Take 5 mg by mouth 5 (five) times a day. 7am, 11am, 3pm. 7pm, 11pm     oxyCODONE (ROXICODONE) 5 MG immediate release tablet Take 5 mg by mouth every 4 (four) hours as needed for pain (during night shift 12am-7am).      rOPINIRole (REQUIP) 2 MG tablet Take 2 mg  by mouth at bedtime.     senna-docusate (SENNOSIDES-DOCUSATE SODIUM) 8.6-50 mg tablet Take 1 tablet by mouth 2 (two) times a day.       Labs:    Lab Units 07/19/17  0925 07/18/17  0618 07/17/17 0422 07/16/17  0540 07/15/17  0514   LN-SODIUM mmol/L  --  142 144  144 149* 146*   LN-POTASSIUM mmol/L 3.8 3.6 3.9  3.9 3.8 3.8   LN-CHLORIDE mmol/L  --  94* 97*  97* 107 107   LN-CO2 mmol/L  --  34* 34*  34* 29 28   LN-BLOOD UREA NITROGEN mg/dL  --  98* 96*  96* 89* 90*   LN-CREATININE mg/dL  --  2.01* 2.18*  2.18* 2.32* 2.94*   LN-CALCIUM mg/dL  --  8.8 9.0  9.0 8.7 8.4*   LN-PROTEIN TOTAL g/dL  --   --  5.7* 5.7* 5.4*   LN-BILIRUBIN TOTAL mg/dL  --   --  1.1* 0.8 0.6   LN-ALKALINE PHOSPHATASE U/L  --   --  46 49 52   LN-ALT (SGPT) U/L  --   --  7 <6 <6   LN-AST (SGOT) U/L  --   --  16 16 14       Lab Units 07/17/17  0422 07/16/17  0540 07/15/17  0514   LN-WHITE BLOOD CELL COUNT thou/uL 9.2 7.6 5.6   LN-HEMOGLOBIN g/dL 10.0* 9.2* 9.0*   LN-HEMATOCRIT % 29.7* 27.9* 27.3*   LN-PLATELET COUNT thou/uL 121* 103* 101*           7/17/2017  XR CHEST AP PORTABLE : resp failure  . FINDINGS: Left PICC tip in the superior vena cava. Persistent infiltrate at the left base. Lungs appear hyperexpanded consistent with emphysema. Normal heart size. No pneumothorax. Skeleton is demineralized.      Ct Head Without Contrast     CONCLUSION:   Redemonstration of the large extra-axial mass in the left frontal convexity with surrounding large area of presumed vasogenic edema in the left frontal lobe, not significantly changed compared to brain MRI 01/30/2016        Assessment:    ICD-10-CM    1. Hip fracture, left, closed, with routine healing, subsequent encounter S72.002D    2. Respiratory failure, post-operative J95.821    3. Moderate COPD (chronic obstructive pulmonary disease) J44.9    4. Acute blood loss anemia D62    5. Chronic kidney disease N18.9    6. Dependence on supplemental oxygen Z99.81    7. Single kidney Z90.5    8.  Meningioma D32.0    9. Thrombocytopenia D69.6    10. Hypernatremia E87.0          Plan:  Will schedule lower dose (5mg) Oxycodone but 5x/day for more even pain control. Modify as needed.  She asked for sleep aid, will try Melatonin.  Therapies for mobility and strengthening        Electronically signed by: Selam Jeff MD

## 2021-06-25 NOTE — PROGRESS NOTES
Progress Notes by Manuel Levy MD at 7/10/2017  9:30 AM     Author: Manuel Levy MD Service: Resident Author Type: Resident    Filed: 7/10/2017  2:41 PM Date of Service: 7/10/2017  9:30 AM Status: Attested    : Manuel Levy MD (Resident) Cosigner: Ariana Bar MD at 7/11/2017  7:38 AM    Attestation signed by Ariana Bar MD at 7/11/2017  7:38 AM    [facility] Faculty Attestation    I have seen and examined the patient.    I have discussed the case with the resident physician, Dr. Levy, and I assessed the patient on 7/10/2017.    I agree with the findings, assessment and plan.    Ariana Bar MD                    [facility] Daily Progress Note    Assessment & Plan: Jhoana Ramirez is a 83 y.o. female with a past medical history of tobacco dependence, COPD, hypertension, and macrocytic anemia who was admitted secondary to a fall yesterday and found to have a left hip fracture.    Principal Problem:    Hip fracture  Active Problems:    Hypertension    Chronic Obstructive Pulmonary Disease    Macrocytic anemia    Pneumothorax on right    Left trochanteric hip fracture:  sustained after an unwitnessed fall.      - seen by Orthopedic surgery; they will take her for IM nailing procedure this afternoon.  - initial laboratory workup for possible causes of fall have been unrevealing.  Will follow-up urine culture as there was some question about leukocyte esterase on the urinalysis.  - there could possibly be a component of this new-onset peripheral edema causing unsteady gate; will hold Norvasc here in the hospital.  Will look for last Echo; BNP mildly elevated on presentation.     COPD: on 2 L/min of home O2 per nasal cannula 24 hours per day.  - continue supplemental oxygen via Oxi-mask, wean as tolerated.  Has been maintaining her O2 sats in the low to mid 90s over the last 24 hours.  - Symbicort 2 puffs BID; Duo-Neb treatments as needed.  - Albuterol inhaler  "PRN.     Hypertension: has been hypotensive at times here in the hospital.  On Norvasc 10 mg daily at home.    - holding Norvasc here.  Can write for PRN Hydralazine for SBP > 180 or DBP > 110.  - Norvasc could be contributing to her peripheral edema and possibly the fall.  Can consider restarting blood pressure medication here if pressures creep back up.     Macrocytic Anemia: appears to be chronic in nature, as her most recent Hemoglobin readings here in the hospital over the last year range from the 8s to low 10s.  There could possibly be a component of acute blood loss given the hip fracture.  - peripheral smear showing a normocytic macrocytosis and thrombocytopenia.  - will repeat CBC in the AM after surgery.  Type and cross in case needing transfusion.  If patient is symptomatic or Hgb drops below 7.0, can transfuse 1 unit pRBCs.     Hx of seizures:  Will need to assess with patient more; reportedly taking Keppra but had told admitting provider that she used them \"as needed\".  Will address this post-surgery.    Diet: NPO until after surgery.   Fluids:  ml/hr prior to surgery.   VTE Prophylaxis: None.    Discharge Planning discussed with [facility].  Barriers to discharge:  Medical - pending surgery from Orthopedic Surgery.  Anticipated discharge day: TBD.  Disposition:  acute rehab  Status: inpatient admit  Length of Stay: 1     Patient dicussed with attending [facility] physician, Dr.Amanda Bar, who agrees with the plan.     Manuel Levy PGY1 7/10/2017  [facility] Medicine   Pager: [phone] (from 8AM-5:30PM)    This is a [facility] Medicine Patient.  Please call the senior pager with any questions or concerns, 24/7.  [phone]    Subjective/Interval events:  Pain in the left hip and left buttocks area.  Denies shortness of breath or cough.  No chest pain or abdominal pain.  Does use O2 24/7 at home.  Oriented to person and place but not " time.  Lives at home with her son, Tanmay.  States that the swelling in her legs has been going on for several weeks.  No headaches.    Objective  Vital signs in last 24 hours  Temp:  [97.5  F (36.4  C)-98.3  F (36.8  C)] 98.2  F (36.8  C)  Heart Rate:  [81-92] 92  Resp:  [18-23] 23  BP: ()/(45-59) 105/59  144 lb 9.6 oz (65.6 kg)     Physical Exam  General:  Oxy-mask on, appears drowsy but answers questions appropriately.  Does not appear to be in acute distress.  Respiratory:  Diminished breath sounds bilaterally in the anterior thorax.  I do not appreciate any wheezes or rhonchi.  Cardiovascular:  Heart regular rate and rhythm, with occasional skipped beats.  No murmurs or rubs are noted.  Abdomen:  Non-tender, non-distended.  Bowel sounds are present.  Extremities:  Unable to mobilize left lower extremity.  Mild ecchymoses present over left groin and posterior hip.  Point tenderness over the greater trochanter on the left side but not into the buttocks or groin area.    Intake/Output last 3 shifts  I/O last 3 completed shifts:  In: -   Out: 125 [Urine:125]     Pertinent Labs     Results from last 7 days  Lab Units 07/10/17  0314 07/09/17  1440   LN-WHITE BLOOD CELL COUNT thou/uL 5.4  5.4 5.9   LN-HEMOGLOBIN g/dL 8.4*  8.4* 9.1*   LN-HEMATOCRIT % 27.7*  27.7* 28.9*   LN-PLATELET COUNT thou/uL 125*  125* 132*       Results from last 7 days  Lab Units 07/10/17  0314 07/09/17  1440   LN-SODIUM mmol/L 143 142   LN-POTASSIUM mmol/L 5.4* 5.3*   LN-CHLORIDE mmol/L 108* 107   LN-CO2 mmol/L 25 27   LN-BLOOD UREA NITROGEN mg/dL 22 19   LN-CREATININE mg/dL 1.73* 1.66*   LN-CALCIUM mg/dL 8.1* 8.4*   LN-ALBUMIN g/dL  --  3.1*   LN-PROTEIN TOTAL g/dL  --  5.8*   LN-BILIRUBIN TOTAL mg/dL  --  0.4   LN-ALKALINE PHOSPHATASE U/L  --  77   LN-ALT (SGPT) U/L  --  <6   LN-AST (SGOT) U/L  --  12       Results from last 7 days  Lab Units 07/09/17  1440   LN-INR  1.01   LN-PARTIAL THROMBOPLASTIN TIME seconds 39*     -UA  negative for nitrites, small leukocyte esterase.  -Urine culture with > 100,000 E. Coli.  -H&H and CMP as above.  -Magnesium 1.3 on admission; repeat on 7/10 was 2.0.  -Troponin (-) x 3.  -CK-MB 2 (wnl), CK total 41 (wnl).  -.  -Ferritin 178 (H), Iron 25 (L), Transferrin 151 (L)  -Reticulocytes 0.039.  -Peripheral blood smear with macrocytosis (normocytic) and thrombocytopenia.       Pertinent Radiology    Xr Hip Left 2 Or More Vws    Result Date: 7/9/2017  XR HIP LEFT 2 OR MORE VWS 7/9/2017 3:39 PM INDICATION: Hip pain.    COMPARISON: None. FINDINGS: Minimal sclerosis along the left femur intertrochanteric region and slight lucency along the greater trochanter, though no definitive fracture. If high suspicion, CT could be performed. No dislocation. Degenerative change of the spine, SI joints and hips.     Ct Lumbar Spine Without Contrast    Result Date: 7/9/2017  [facility] CT LUMBAR SPINE WO CONTRAST 7/9/2017 3:26 PM INDICATION: Fall. TECHNIQUE: Helical images were obtained through the lumbar spine and were evaluated in the axial plane with sagittal and coronal reformations. Dose reduction techniques were used. COMPARISON: 3/16/2016. FINDINGS: Nomenclature is based on 5 lumbar type vertebral bodies. Vertebral body height loss at L2, L3, and L5 is unchanged from 3/16/2016. Lumbar dextroscoliosis. Lateral listhesis of L4 and L5. Grade 1 retrolisthesis L2 on L3 and L3 on L4. Grade 1 anterolisthesis L4 on L5. Right L5 pars defect, unchanged. Mild paraspinal muscular atrophy. Limited evaluation of the retroperitoneum reveals atherosclerotic disease of the abdominal aorta with dilatation measuring up to 3 cm. Several renal cysts are poorly characterized but appears similar to 3/16/2016. Right kidney is not identified. Mild degenerative changes in the SI joints. Visualized portions of the sacrum are unremarkable. T12-L1: Disc height maintained. Shallow disc bulge. Mild facet arthropathy. No spinal canal  stenosis. No right neural foraminal stenosis. No left neural foraminal stenosis. L1-L2: Loss of disc height with vacuum disc phenomenon. Diffuse disc bulge. Mild facet arthropathy. No spinal canal stenosis. Mild right neural foraminal stenosis. No left neural foraminal stenosis. L2-L3: Loss of disc height. Diffuse disc bulge. Mild facet arthropathy. No spinal canal stenosis. No right neural foraminal stenosis. No left neural foraminal stenosis. L3-L4: Loss of disc height. Diffuse disc bulge. Moderate ligamentum flavum hypertrophy and facet arthropathy. Mild to moderate spinal canal stenosis. Mild right neural foraminal stenosis. Mild to moderate left neural foraminal stenosis. L4-L5: Loss of disc height and T2 signal. Diffuse calcified disc bulge. Moderate calcified ligamentum flavum hypertrophy and facet arthropathy. Moderate spinal canal stenosis with narrowing of the right lateral recess. Moderate right neural foraminal stenosis. Severe left neural foraminal stenosis. L5-S1: Loss of disc height with a diffuse disc osteophyte complex. Appears to be postsurgical changes from a right hemilaminectomy. Moderate facet arthropathy. Mild spinal canal stenosis with narrowing of the right lateral recess. Severe right neural foraminal stenosis. Mild left neural foraminal stenosis.     CONCLUSION: 1.  No acute fracture. 2.  Right L5 pars defect, unchanged. 3.  Advanced multilevel degenerative disc and facet disease. 4.  At L3-L4 there is mild to moderate spinal canal narrowing with mild to moderate left and mild right neural foraminal narrowing. 5.  At L4-L5 there is moderate spinal canal stenosis with narrowing of the right lateral recess. Severe left and moderate right neural foraminal narrowing. 6.  At L5-S1 there is mild spinal canal narrowing with narrowing of the right lateral recess. Severe right and mild left neural foraminal narrowing. 7.  Aortic aneurysmal dilatation to 3.0 cm, similar to 3/16/2016.    Xr Chest  Ap    Result Date: 7/9/2017  XR CHEST AP 7/9/2017 3:30 PM INDICATION: Fall. Chest pain. COMPARISON: None. FINDINGS: Tiny right apical and lateral pneumothorax. Possibility would include rib fracture or sequela of emphysema seen on prior CT as an etiology (though no overt rib fractures noted on radiography). Minimal basilar atelectasis or scarring. Otherwise,  the lungs are clear. Stable cardiomediastinal silhouette. Aortic atherosclerosis. Findings verbally communicated [facility] physician Lewis at 3:44 PM on 7/9/2017.     Ct Hip Without Contrast Left    Result Date: 7/9/2017  CT LEFT HIP 7/9/2017 4:30 PM INDICATION: Fall, pain. TECHNIQUE: Noncontrast. Axial, sagittal and coronal thin-section reconstruction. Dose reduction techniques were used. COMPARISON: Plain films 07/09/2017 FINDINGS: There is an irregular transversely oriented fracture through the left greater trochanter extending to the base of the greater trochanter posteriorly, the proximal fragment is distracted and displaced superiorly, posteriorly, and medially by as much as 1 cm. There is a subtle nondisplaced fracture extending from the posterior aspect of the greater trochanteric fracture into the intertrochanteric region along the posterior cortex, with extension to the base of the lesser trochanter through the anterior cortex, best seen on series 2 image 58 and 59, and series 5 image 42 and 43. There is soft tissue stranding and hemorrhage adjacent to the fracture. No additional fracture. Mild bony demineralization. Moderate degenerative change left hip joint and mild chondrocalcinosis. Degenerative change of the pubic symphysis. Degenerative change left SI joint and visualized lumbar spine.     CONCLUSION: 1.  Displaced fracture through the left greater trochanter. There is involvement of the intertrochanteric region with a nondisplaced component to the fracture extending into the intertrochanteric region to the base of the lesser trochanter.      Current Medications.   ? [MAR Hold] budesonide-formoterol  2 puff Inhalation BID   ? [MAR Hold] famotidine  20 mg Oral DAILY   ? [MAR Hold] levETIRAcetam  500 mg Oral BID   ? [MAR Hold] rOPINIRole  2 mg Oral QHS   ? [MAR Hold] senna-docusate  1 tablet Oral BID   ? [MAR Hold] sodium chloride  3 mL Intravenous Line Care   ? sodium chloride  3 mL Intravenous Line Care     PRN:[MAR Hold] albuterol, [MAR Hold] bisacodyl, ceFAZolin (ANCEF) IV, fentaNYL pf, [MAR Hold] HYDROmorphone, lactated Ringers, lidocaine, midazolam, [MAR Hold] naloxone **OR** [MAR Hold] naloxone, [MAR Hold] nitroglycerin, [MAR Hold] ondansetron **OR** [MAR Hold] ondansetron    This note was created with help of Dragon dictation system. Grammatical /typing errors are not intentional.

## 2021-06-25 NOTE — PROGRESS NOTES
Progress Notes by Manuel Levy MD at 7/13/2017  7:13 AM     Author: Manuel Levy MD Service: Resident Author Type: Resident    Filed: 7/13/2017 12:19 PM Date of Service: 7/13/2017  7:13 AM Status: Attested    : Manuel Levy MD (Resident) Cosigner: Ariana Bar MD at 7/14/2017  8:15 AM    Attestation signed by Ariana Bar MD at 7/14/2017  8:15 AM    [facility] Faculty Attestation    I have seen and examined the patient.    I have discussed the case with the resident physician, Dr. Levy, and I assessed the patient on 7/13/2017.    I agree with the findings, assessment and plan.    Ariana Bar MD                    [facility] Daily Progress Note    Assessment & Plan: Jhoana Ramirez is a 83 y.o. female with a past medical history of tobacco dependence, COPD, hypertension, and macrocytic anemia who was admitted secondary to a fall yesterday and found to have a left hip fracture.    Principal Problem:    Hip fracture  Active Problems:    Benign essential hypertension    Moderate COPD (chronic obstructive pulmonary disease)    Macrocytic anemia    Acute postoperative respiratory failure    Respiratory failure, post-operative    Acute on chronic kidney failure    Pneumonia of right lower lobe due to infectious organism    UTI (urinary tract infection)    Single kidney    Acute post-op respiratory failure:  Had to be intubated multiple times on 7/10 following surgery for her hip.  Blood pressures were low as well, given 2 doses of phenylephrine.  Patient was extubated for the second time and could not protect her airway, with acidotic ABGs and decision was made to intubate again and transfer to ICU.    -ICU team continues to manage, respiratory failure most likely secondary to underlying COPD and oversedation.  Repeat CT head yesterday did not show new infarct or hemorrhage, and the patient has been more alert, making status epilepticus less likely.  - remains  intubated on 7/13.  Precedex switched to Versed for sedation, Fentanyl for pain.  Arm restraints are still in place.    - Budesonide and Formoterol neb solutions.  - Lasix discontinued due to worsening creatinine and stable fluid status.  - Tylenol for post-op pain; avoiding excessive narcotics given relative sedation and difficulty with respiration.  - Novolog SSI for blood glucose control; received 10 units total of insulin on 7/12.  Likely being exacerbated by the IV methylpred she is receiving.  - Protonix for PPI prophylaxis.  - Zosyn for empiric pneumonia coverage, given on ventilator and with concern for aspiration.  - Ropinirole 2 mg.  - Methylprednisolone IV q 6 hrs.  - IVF bolus if continued low pressures; goal MAP > 65.  - hemoglobin levels have been stable; continue to monitor.  - now appears to be switching in and out of atrial fibrillation.  On Heparin SQ for now.  Will continue to monitor.    - will attempt to wean sedation and will extubate when able, per ICU recommendations.    Left trochanteric hip fracture, in setting of unwitnessed fall:        - IM nailing procedure on 7/11.  Tylenol and Fentanyl for pain.  - initial laboratory workup for possible causes of fall have been unrevealing.  Urine culture grew E. Coli and Klebsiella; repeat urinalysis today (-) for nitrites and LE.  - there could possibly be a component of this new-onset peripheral edema causing unsteady gate; will hold Norvasc here in the hospital.    - BNP mildly elevated on presentation; most recent on 7/12 at 191.  - last Echo in 2016 showing LVEF of 65% with mild pulmonary hypertension.  - Echo repeated here with LVEF of 60 % with mild to moderate RV enlargement/mildly decreased right ventricular systolic function.   - Lasix discontinued today, as patient appears eu- to hypovolemic.  - Ortho continuing to follow; will recommend early ambulation when patient is extubated and able to bear weight.    COPD: on 2 L/min of home O2 per  "nasal cannula 24 hours per day.  - uses Symbicort 2 puffs BID and Albuterol inhaler PRN at home.   - breathing treatments as stated above while on ventilator.    Hypertension: has been hypotensive at times here in the hospital.  On Norvasc 10 mg daily at home.    - holding Norvasc here.    Macrocytic Anemia: appears to be chronic in nature, as her most recent Hemoglobin readings here in the hospital over the last year range from the 8s to low 10s.  There could possibly be a component of acute blood loss given the hip fracture.  - peripheral smear showing a normocytic macrocytosis and thrombocytopenia.  - If patient is symptomatic or Hgb drops below 7.0, can transfuse 1 unit pRBCs.  - being supplemented with iron sucrose through the IV.  - recent hemoglobin checks have been stable around 9.    Meningioma:  Reportedly taking Keppra but had told admitting provider that she used them \"as needed\".  Unable to contact son yesterday; most likely on the Keppra due to chronic meningioma.  Repeat CT head showing meningioma and mass effect.    Diet: OG tube in place, tube feedings currently.   Fluids: NS 25 ml/hr.   VTE Prophylaxis: Heparin SQ injections.    Discharge Planning discussed with [facility].  Barriers to discharge:  Medical - pending extubation and recovery from surgery.  Anticipated discharge day: TBD.  Disposition:  acute rehab  Status: inpatient admit  Length of Stay: 4     Patient discussed with attending [facility] physician, Dr.Amanda Bar, who agrees with the plan.     Manuel Levy PGY1 7/13/2017  [facility] Medicine   Pager: [phone] (from 8AM-5:30PM)    This is a [facility] Patient.  Please call the senior pager with any questions or concerns, 24/7.  [phone]    Subjective/Interval events:  Had low blood pressures throughout the late evening and morning.  Not quite as agitated, able to open her eyes on command at times.  She is " breathing over the ventilator at times and has been less sedated.  Making urine.  Able to squeeze my hand weakly and nod her head, but no other purposeful movement as she remains sedated.    Objective  Vital signs in last 24 hours  Temp:  [98  F (36.7  C)-99  F (37.2  C)] 99  F (37.2  C)  Heart Rate:  [] 106  Resp:  [12-24] 12  BP: ()/(48-83) 94/53  FiO2 (%):  [45 %-100 %] 45 %  153 lb 9.6 oz (69.7 kg)     Physical Exam  General:  Intubated.  Appears sedated.  Not reactive to painful stimuli but does open her eyes when I say her name.  Urine is present in the Milner catheter.  Equal and symmetric chest rise.       Respiratory:  Lung sounds anteriorly are clear and vesicular without crackles.   Cardiovascular:  Heart regular rate, tachycardic.  No clicks or murmurs.  Abdomen:  Non-distended, bowel sounds are present.  Extremities:  No lower extremity edema.  Left hip wounds from surgery bandaged, with serosanguinous drainage.     Intake/Output last 3 shifts  I/O last 3 completed shifts:  In: 2631.6 [I.V.:1288.1; NG/GT:1039; IV Piggyback:304.5]  Out: 2550 [Urine:2550]     Pertinent Labs     Results from last 7 days  Lab Units 07/13/17  1009 07/13/17  0429 07/13/17  0006 07/12/17  0454 07/11/17  1052   LN-WHITE BLOOD CELL COUNT thou/uL  --  7.5  --  3.8* 3.3*   LN-HEMOGLOBIN g/dL 9.1* 9.0* 9.5* 9.3* 7.3*   LN-HEMATOCRIT %  --  25.8*  --  26.9* 22.9*   LN-PLATELET COUNT thou/uL  --  111*  --  113* 108*       Results from last 7 days  Lab Units 07/13/17  0429 07/12/17  0454 07/11/17  2016  07/09/17  1440   LN-SODIUM mmol/L 144 144 142  < > 142   LN-POTASSIUM mmol/L 4.0 4.7 4.8  < > 5.3*   LN-CHLORIDE mmol/L 108* 110* 111*  < > 107   LN-CO2 mmol/L 26 22 20*  < > 27   LN-BLOOD UREA NITROGEN mg/dL 56* 45* 42*  < > 19   LN-CREATININE mg/dL 2.77* 2.35* 2.21*  < > 1.66*   LN-CALCIUM mg/dL 8.0* 8.2* 8.1*  < > 8.4*   LN-ALBUMIN g/dL 2.5* 2.6*  --   --  3.1*   LN-PROTEIN TOTAL g/dL 4.7* 4.8*  --   --  5.8*    LN-BILIRUBIN TOTAL mg/dL 0.4 0.5  --   --  0.4   LN-ALKALINE PHOSPHATASE U/L 49 53  --   --  77   LN-ALT (SGPT) U/L <6 <6  --   --  <6   LN-AST (SGOT) U/L 10 10  --   --  12   < > = values in this interval not displayed.    Results from last 7 days  Lab Units 07/13/17  0429 07/12/17  0454 07/11/17  0423 07/09/17  1440   LN-INR  1.15* 1.17* 1.24* 1.01   LN-PARTIAL THROMBOPLASTIN TIME seconds  --   --   --  39*     ABG:  Recent Labs      07/10/17   1834   07/12/17   0921   PHART  7.08*   < >  7.44   OXYHB  81.7*   < >  96.4*   BEARTCALC  -6.6   < >  1.0   POCPEEP  6   < >  5   TEMP  37.0   < >  37.0   POCRATE  18   < >  10   PSV  14   --    --     < > = values in this interval not displayed.     -repeat Urinalysis on 7/13 (-) for nitrites and leukocyte esterase.  Previous urine culture from admission day had grown both E. Coli and Klebsiella.    Pertinent Radiology    CT Head on 7/12--no ischemia or hemorrhage.  Vasogenic edema and meningioma in left frontal lobe unchanged in size from MRI in January 2016.    Current Medications.   ? acetaminophen  1,000 mg Enteral Tube TID    Or   ? acetaminophen  650 mg Rectal TID   ? budesonide  0.5 mg Nebulization BID - RT   ? chlorhexidine  15 mL Topical Q12H   ? formoterol fumarate  20 mcg Nebulization Q12H   ? [START ON 7/14/2017] furosemide  40 mg Intravenous DAILY   ? gabapentin  100 mg Enteral Tube QHS   ? heparin (PF)  5,000 Units Subcutaneous Q12H 09-21   ? insulin aspart (NovoLOG) injection   Subcutaneous Q6H   ? ipratropium-albuterol  3 mL Nebulization Q4H - RT   ? iron sucrose (VENOFER) IVPB  100 mg Intravenous Q24H   ? levETIRAcetam  (KEPPRA) solution 100 mg/mL  500 mg Enteral Tube BID    Or   ? levETIRAcetam (KEPPRA) IVPB  500 mg Intravenous BID   ? methylPREDNISolone sodium succinate  60 mg Intravenous Q6H   ? omeprazole  20 mg Enteral Tube QHS    Or   ? omeprazole  20 mg Oral QHS    Or   ? pantoprazole  40 mg Intravenous QHS   ? piperacillin-tazobactam (ZOSYN) IV   3.375 g Intravenous Q12H   ? polyethylene glycol  17 g Oral DAILY   ? rOPINIRole  2 mg Enteral Tube QHS   ? senna-docusate  1 tablet Oral BID    Or   ? senna (SENOKOT) syrup  8.8 mg Enteral Tube BID   ? sodium chloride  10-30 mL Intravenous Q8H FIXED TIMES   ? sodium chloride  3 mL Intravenous Line Care   ? sodium polystyrene sulfonate  30 g Enteral Tube Once   ? vecuronium (NORCURON) syringe 1 mg/1 mL  0.1 mg/kg Intravenous Once   ? viscous lidocaine HC  15 mL Oral Once   ? white petrolatum-mineral oil   Both Eyes Q8H     PRN:acetaminophen, albuterol, aluminum-magnesium hydroxide-simethicone, bacitracin, benzocaine-menthol, bisacodyl, dextrose 50 % (D50W), fentaNYL 1500 mcg/150 mL in NS (10 mcg/ml), fentaNYL pf, glucagon (human recombinant), HYDROmorphone, insulin regular infusion 0.5 unit/mL, lidocaine, lipase-protease-amylase **AND** sodium bicarbonate, loratadine, LORazepam, metoclopramide **OR** metoclopramide **OR** metoclopramide, naloxone **OR** naloxone, ondansetron **OR** ondansetron, oxyCODONE, polyvinyl alcohol, sodium chloride, sodium chloride, sodium chloride, sodium phosphates 133 mL    This note was created with help of Dragon dictation system. Grammatical /typing errors are not intentional.

## 2021-06-25 NOTE — PROGRESS NOTES
"Progress Notes by Prabha Lima MD at 7/10/2017 10:39 PM     Author: Prabha Lima MD Service: Family Medicine Author Type: Resident    Filed: 7/10/2017 10:48 PM Date of Service: 7/10/2017 10:39 PM Status: Attested    : Prabha Lima MD (Resident) Cosigner: Ariana Bar MD at 7/11/2017  7:39 AM    Attestation signed by Ariana Bar MD at 7/11/2017  7:39 AM    [facility] Faculty Attestation    I have discussed the case with the resident physician, Dr. Lima.    I agree with the findings, assessment and plan.    Ariana Bar MD                    Brief Post-op note    S: Seen briefly in ICU after procedure. Per chart review and discussion with Dr. Robert, ICU physician, patient had difficulty with extubation after surgery with initial ABGs showing a pH of 6.99 with pCO2 of 114. She was subsequently reintubated which ABGs showing improvement. She was also hypotensive on presentation to ICU and received IVF for volume resuscitation.     O:  BP 92/52  Pulse 60  Temp 97.5  F (36.4  C)  Resp 16  Ht 5' 6\" (1.676 m)  Wt 144 lb 9.6 oz (65.6 kg)  SpO2 100%  BMI 23.34 kg/m2   On precedex 0.4 mcg/kg/hr  Patient is easily arousable, opens eyes to voice. ET tube in place  Lungs are clear to ausculation, no wheezes or crackles.     A/P:  Acute post-op hypercapnic respiratory failure: Per discussion with Dr. Robert, likely will be able to extubate tomorrow. Clear breath sounds, unlikely significantly affected by COPD. Will give IV steroids with nebs overnight, precedex and fentanyl for pain/agitation, trend ABGs, and likely attempt extubation in AM.     DNR status: Discussed on admission with patient and her sons, who are familiar with her advance directive paperwork and goals of care. Will need to contact in AM for update on Jhoana's status and further discussion about extubation and re-intubation.    Will staff with Dr. Bar and morning team in AM  Prabha Lima MD  PGY-2, [facility]  Pager: [phone]

## 2021-06-25 NOTE — PROGRESS NOTES
Progress Notes by Manuel Levy MD at 7/14/2017  7:07 AM     Author: Manuel Levy MD Service: Resident Author Type: Resident    Filed: 7/14/2017  2:40 PM Date of Service: 7/14/2017  7:07 AM Status: Attested    : Manuel Levy MD (Resident) Cosigner: Ariana Bar MD at 7/15/2017  8:24 AM    Attestation signed by Ariana Bar MD at 7/15/2017  8:24 AM    [facility] Faculty Attestation    I have seen and examined the patient.    I have discussed the case with the resident physician, Dr. Levy, and I assessed the patient on 7/14/2017.    I agree with the findings, assessment and plan.    Ariana Bar MD                    [facility] Daily Progress Note    Assessment & Plan: Jhoana Ramirez is a 83 y.o. female with a past medical history of tobacco dependence, COPD, hypertension, and macrocytic anemia who was admitted secondary to a fall yesterday and found to have a left hip fracture.    Principal Problem:    Hip fracture  Active Problems:    Benign essential hypertension    Moderate COPD (chronic obstructive pulmonary disease)    Macrocytic anemia    Acute postoperative respiratory failure    Respiratory failure, post-operative    Acute on chronic kidney failure    Pneumonia of right lower lobe due to infectious organism    UTI (urinary tract infection)    Single kidney    Meningioma    Acute post-op respiratory failure:  Had to be intubated multiple times on 7/10 following surgery for her hip.  Blood pressures were low as well, given 2 doses of phenylephrine.  Patient was extubated for the second time and could not protect her airway, with acidotic ABGs and decision was made to intubate again and transfer to ICU.    -ICU team continues to manage, respiratory failure most likely secondary to underlying COPD and oversedation.  Repeat CT head on 7/12 did not show new infarct or hemorrhage, and the patient has moments where she is more alert, making status epilepticus  less likely.  Received Lasix initially, thinking about fluid overload, but her creatinine numbers continue to climb even without Lasix yesterday.  - remains intubated on 7/14, on Precedex, Fentanyl, and Verced.    - Budesonide and Formoterol neb solutions.  - Tylenol for post-op pain; avoiding excessive narcotics given relative sedation and difficulty with respiration.  - Novolog SSI for blood glucose control; received 10 units total of insulin on 7/12.  Likely being exacerbated by the IV methylpred she is receiving.  - Protonix for PPI prophylaxis.  - continues on Zosyn for empiric pneumonia coverage, given on ventilator and with concern for aspiration.  - Ropinirole 2 mg.  - Methylprednisolone IV q 6 hrs.  - IVF bolus if continued low pressures; goal MAP > 65.  Pressures have improved, with MAP consistently above 65 last 12 hours.  - hemoglobin levels have been stable; continue to monitor.  - EKG obtained evening of 7/13 concerning for new ST elevation in leads II, III, aVF from previous exam on 7/11.  Report was not read formally and report was not found by writer until 7/14 rounds.  Stat repeat EKG and stat Troponin I drawn, ICU team notified.       - had been switching in and out of Atrial Fibrillation on tele.  On Heparin SQ for now.  Will continue to monitor.    - will attempt to wean sedation and will extubate when able, per ICU recommendations.  - will talk to son about goals of care at this point.    Left trochanteric hip fracture, in setting of unwitnessed fall:        - IM nailing procedure on 7/11.  Tylenol and Fentanyl for pain.  - initial laboratory workup for possible causes of fall have been unrevealing.  Urine culture grew E. Coli and Klebsiella; repeat urinalysis (-) for nitrites and LE.  - there could possibly be a component of this new-onset peripheral edema causing unsteady gate; will hold Norvasc here in the hospital.    - BNP mildly elevated on presentation; most recent on 7/12 at 191.  -  "last Echo in 2016 showing LVEF of 65% with mild pulmonary hypertension.  - Echo repeated here in hospital with LVEF of 60 % with mild to moderate RV enlargement/mildly decreased right ventricular systolic function.   - No longer receiving Lasix.  Ortho following.    COPD: on 2 L/min of home O2 per nasal cannula 24 hours per day.  - uses Symbicort 2 puffs BID and Albuterol inhaler PRN at home.   - breathing treatments as stated above while on ventilator.    Hypertension: has been hypotensive at times here in the hospital.  On Norvasc 10 mg daily at home.    - holding Norvasc here.    Macrocytic Anemia: appears to be chronic in nature, as her most recent Hemoglobin readings here in the hospital over the last year range from the 8s to low 10s.  There could possibly be a component of acute blood loss given the hip fracture.  - peripheral smear showing a macrocytosis and thrombocytopenia.  - If patient is symptomatic or Hgb drops below 7.0, can transfuse 1 unit pRBCs.  - being supplemented with iron sucrose through the IV.  - recent hemoglobin checks continue to be stable.  - Nephrology following and thinking possibly related to CKD.    Meningioma:  Reportedly taking Keppra but had told admitting provider that she used them \"as needed\".  Unable to contact son yesterday; most likely on the Keppra due to chronic meningioma.   - Repeat CT head showing meningioma and mass effect, likely unchanged from MRI.  - Neurosurgery consulted; patient hospitalized for this meningioma in 2016 and family decided on non-operative approach.    Diet: OG tube in place, tube feedings currently.   Fluids: NS 25 ml/hr.   VTE Prophylaxis: Heparin SQ injections.    Discharge Planning discussed with [facility].  Barriers to discharge:  Medical - pending extubation and recovery from surgery.  Anticipated discharge day: TBD.  Disposition:  acute rehab  Status: inpatient admit  Length of Stay: 5     Patient discussed with " attending [facility] Medicine physician, Dr.Amanda Bar, who agrees with the plan.     Manuel Levy PGY1 7/14/2017  [facility]   Pager: [phone] (from 8AM-5:30PM)    This is a [facility] Medicine Patient.  Please call the senior pager with any questions or concerns, 24/7.  [phone]    Subjective/Interval events:  Remains intubated, appears uncomfortable.  Continues with Milner catheter.  Opens eyes to commands but writhing in discomfort and trying to remove arms from restraints.  Unable to respond to my questions with head nod.     Objective  Vital signs in last 24 hours  Temp:  [97.9  F (36.6  C)-99  F (37.2  C)] 98.4  F (36.9  C)  Heart Rate:  [] 90  Resp:  [11-36] 18  BP: ()/(47-84) 155/84  FiO2 (%):  [40 %-45 %] 40 %  153 lb 9.6 oz (69.7 kg)     Physical Exam  General:  Remains intubated, appears quite uncomfortable, opens her eyes and moves her upper extremities with stimulation but quickly closes eyes again.        Respiratory:  Crackles bilaterally in the anterior lung fields, worse on the right.  Cardiovascular:  Heart regular rate and rhythm, no clicks, murmurs, or rubs.  Abdomen:  Non-distended, bowel sounds are present.    Extremities:  No lower extremity edema noted on my exam.  Mild to moderate ecchymoses present on left leg over surgical incision areas.      Intake/Output last 3 shifts  I/O last 3 completed shifts:  In: 2914.4 [I.V.:882.4; NG/GT:1282; IV Piggyback:750]  Out: 335 [Urine:335]     Pertinent Labs     Results from last 7 days  Lab Units 07/14/17  1137 07/14/17  0307 07/13/17  1645  07/13/17  0429  07/12/17  0454   LN-WHITE BLOOD CELL COUNT thou/uL  --  7.1  --   --  7.5  --  3.8*   LN-HEMOGLOBIN g/dL 8.4* 8.4* 9.1*  < > 9.0*  < > 9.3*   LN-HEMATOCRIT %  --  26.3*  --   --  25.8*  --  26.9*   LN-PLATELET COUNT thou/uL  --  103*  --   --  111*  --  113*   < > = values in this interval not displayed.    Results from last 7 days  Lab  Units 07/14/17  0307 07/13/17  0429 07/12/17  0454   LN-SODIUM mmol/L 143  143 144 144   LN-POTASSIUM mmol/L 4.1  4.1 4.0 4.7   LN-CHLORIDE mmol/L 107  107 108* 110*   LN-CO2 mmol/L 25  25 26 22   LN-BLOOD UREA NITROGEN mg/dL 76*  76* 56* 45*   LN-CREATININE mg/dL 3.32*  3.32* 2.77* 2.35*   LN-CALCIUM mg/dL 7.8*  7.8* 8.0* 8.2*   LN-ALBUMIN g/dL 2.7*  2.7* 2.5* 2.6*   LN-PROTEIN TOTAL g/dL 4.8* 4.7* 4.8*   LN-BILIRUBIN TOTAL mg/dL 0.3 0.4 0.5   LN-ALKALINE PHOSPHATASE U/L 53 49 53   LN-ALT (SGPT) U/L <6 <6 <6   LN-AST (SGOT) U/L 15 10 10       Results from last 7 days  Lab Units 07/14/17  0307 07/13/17  0429 07/12/17  0454  07/09/17  1440   LN-INR  1.14* 1.15* 1.17*  < > 1.01   LN-PARTIAL THROMBOPLASTIN TIME seconds  --   --   --   --  39*   < > = values in this interval not displayed.  ABG:  Recent Labs      07/12/17   0921   PHART  7.44   OXYHB  96.4*   BEARTCALC  1.0   POCPEEP  5   TEMP  37.0   POCRATE  10     -repeat Urinalysis on 7/13 (-) for nitrites and leukocyte esterase.  Previous urine culture from admission day had grown both E. Coli and Klebsiella.    Pertinent Radiology    CT Head on 7/12--no ischemia or hemorrhage.  Vasogenic edema and meningioma in left frontal lobe unchanged in size from MRI in January 2016.    Current Medications.   ? acetaminophen  1,000 mg Enteral Tube TID    Or   ? acetaminophen  650 mg Rectal TID   ? albumin human  25 g Intravenous Q6H   ? chlorhexidine  15 mL Topical Q12H   ? gabapentin  100 mg Enteral Tube QHS   ? insulin aspart (NovoLOG) injection   Subcutaneous Q6H   ? ipratropium-albuterol  3 mL Nebulization QID   ? iron sucrose (VENOFER) IVPB  100 mg Intravenous Q24H   ? levETIRAcetam  (KEPPRA) solution 100 mg/mL  500 mg Enteral Tube BID    Or   ? levETIRAcetam (KEPPRA) IVPB  500 mg Intravenous BID   ? methylPREDNISolone sodium succinate  60 mg Intravenous Q6H   ? omeprazole  20 mg Enteral Tube QHS    Or   ? omeprazole  20 mg Oral QHS    Or   ? pantoprazole  40 mg  Intravenous QHS   ? piperacillin-tazobactam (ZOSYN) IV  3.375 g Intravenous Q12H   ? polyethylene glycol  17 g Oral DAILY   ? rOPINIRole  2 mg Enteral Tube QHS   ? senna-docusate  1 tablet Oral BID    Or   ? senna (SENOKOT) syrup  8.8 mg Enteral Tube BID   ? sodium chloride  10-30 mL Intravenous Q8H FIXED TIMES   ? sodium chloride  3 mL Intravenous Line Care   ? sodium polystyrene sulfonate  30 g Enteral Tube Once   ? vecuronium (NORCURON) syringe 1 mg/1 mL  0.1 mg/kg Intravenous Once   ? viscous lidocaine HC  15 mL Oral Once   ? white petrolatum-mineral oil   Both Eyes Q8H     PRN:acetaminophen, albuterol, aluminum-magnesium hydroxide-simethicone, bacitracin, benzocaine-menthol, bisacodyl, dextrose 50 % (D50W), fentaNYL 1500 mcg/150 mL in NS (10 mcg/ml), fentaNYL pf, glucagon (human recombinant), HYDROmorphone, insulin regular infusion 0.5 unit/mL, lidocaine, lipase-protease-amylase **AND** sodium bicarbonate, loratadine, LORazepam, metoclopramide **OR** metoclopramide **OR** metoclopramide, naloxone **OR** naloxone, ondansetron **OR** ondansetron, oxyCODONE, polyvinyl alcohol, sodium chloride, sodium chloride, sodium chloride, sodium phosphates 133 mL    This note was created with help of Dragon dictation system. Grammatical /typing errors are not intentional.

## 2021-06-25 NOTE — PROGRESS NOTES
Progress Notes by Manuel Levy MD at 7/16/2017  7:01 AM     Author: Manuel Levy MD Service: Resident Author Type: Resident    Filed: 7/16/2017  8:07 AM Date of Service: 7/16/2017  7:01 AM Status: Attested    : Manuel Levy MD (Resident) Cosigner: Ariana Bar MD at 7/19/2017  3:03 PM    Attestation signed by Ariana Bar MD at 7/19/2017  3:03 PM    [facility] Faculty Attestation    I have seen and examined the patient.    I have discussed the case with the resident physician, Dr. Levy, and I assessed the patient on 7/16/2017.    I agree with the findings, assessment and plan.    Ariana Bar MD                    [facility] Daily Progress Note    Assessment & Plan: Jhoana Ramirez is a 83 y.o. female with a past medical history of tobacco dependence, COPD, hypertension, and macrocytic anemia who was admitted secondary to a fall yesterday and found to have a left hip fracture.    Principal Problem:    Hip fracture  Active Problems:    Benign essential hypertension    Moderate COPD (chronic obstructive pulmonary disease)    Macrocytic anemia    Acute postoperative respiratory failure    Respiratory failure, post-operative    Acute on chronic kidney failure    Pneumonia of right lower lobe due to infectious organism    UTI (urinary tract infection)    Single kidney    Meningioma    Hip fracture, left, closed, with routine healing, subsequent encounter    Acute post-op respiratory failure:  Had to be intubated multiple times on 7/10 following surgery for her hip, received 2 doses phenylephrine.  Patient was extubated for the second time and could not protect her airway, with acidotic ABGs and decision was made to intubate again and transfer to ICU.    -ICU team continues to manage, respiratory failure most likely secondary to underlying COPD and oversedation.  Repeat CT head on 7/12 did not show new infarct or hemorrhage, and the patient has moments where she  is more alert, making status epilepticus less likely.  Pleural effusions were initially noticed bilaterally on chest x-ray, so Lasix was started and continued in the ICU.  Patient's creatinine continued to trend upward and clinically patient was deemed to be euvolemic, so the Lasix was discontinued.  Thought to possibly be an ATN picture, given worsening creatinine and unilateral kidney.  - remains intubated on Fentanyl and Versed.  - over 4 L fluid out in last 24 hours.  - did well with SBT on 7/14, but becomes tachypnic and agitated with prolonged transition to CPAP from vent.  Will attempt wean again today.  - Budesonide and Formoterol neb solutions for breathing support.  - Tylenol for post-op pain; avoiding excessive narcotics given relative sedation and difficulty with respiration.  - Novolog SSI for blood glucose control; Likely being exacerbated by the IV methylpred she is receiving.  - Protonix for PPI prophylaxis.  - continues on Zosyn for empiric pneumonia coverage, given on ventilator and with concern for aspiration.  - Ropinirole 2 mg.  - Methylprednisolone IV q 6 hrs.  - IVF bolus if continued low pressures; goal MAP > 65.  .  - hemoglobin levels have been stable; continue to monitor.  -Repeat EKG on 7/18 showed resolution of the ST elevation, and the troponin that was drawn was negative.     - NSR w/ PACs on tele, occasionally now into A. fib.  On Heparin SQ for now.  Will continue to monitor.    -Goals of care discussion.    Left trochanteric hip fracture, in setting of unwitnessed fall:        - IM nailing procedure on 7/11.  Tylenol and Fentanyl for pain.  - initial laboratory workup for possible causes of fall have been unrevealing.  Urine culture grew E. Coli and Klebsiella; repeat urinalysis (-) for nitrites and LE.  - there could possibly be a component of this new-onset peripheral edema causing unsteady gate; will hold Norvasc here in the hospital.    - BNP mildly elevated on presentation;  "most recent on 7/12 at 191.  - last Echo in 2016 showing LVEF of 65% with mild pulmonary hypertension.  - Echo repeated here in hospital with LVEF of 60 % with mild to moderate RV enlargement/mildly decreased right ventricular systolic function.   - No longer receiving Lasix.  Ortho following.    COPD: on 2 L/min of home O2 per nasal cannula 24 hours per day.  - uses Symbicort 2 puffs BID and Albuterol inhaler PRN at home.     Hypertension: On Norvasc 10 mg daily at home.    - holding Norvasc here, managing pressures as above.    Macrocytic Anemia: appears to be chronic in nature, as her most recent Hemoglobin readings here in the hospital over the last year range from the 8s to low 10s.  There could have initially been a component of acute blood loss with hip fracture, but at this point appears to be mostly related to her chronic anemia.  - peripheral smear showing a macrocytosis and thrombocytopenia.  - If patient is symptomatic or Hgb drops below 7.0, can transfuse 1 unit pRBCs.  - being supplemented with iron sucrose through the IV.  - recent hemoglobin checks continue to be stable.  - Nephrology following and thinking related to CKD.    Meningioma:  Reportedly taking Keppra but had told admitting provider that she used them \"as needed\".  Most likely on the Keppra due to chronic meningioma.   - Repeat CT head showing meningioma and mass effect, likely unchanged from MRI.  - Neurosurgery consulted; patient hospitalized for this meningioma in 2016 and family decided on non-operative approach.    Diet: OG tube in place for feedings.   Fluids: NS 25 ml/hr.   VTE Prophylaxis: Heparin SQ injections.    Discharge Planning discussed with [facility].  Barriers to discharge:  Medical - pending extubation and recovery from surgery.  Anticipated discharge day: TBD.  Disposition:  acute rehab  Status: inpatient admit  Length of Stay: 7     Patient discussed with attending [facility] " physician, Dr.Amanda Bar, who agrees with the plan.     Manuel Levy PGY1 7/16/2017  [facility]   Pager: [phone] (from 8AM-5:30PM)    This is a [facility] Patient.  Please call the senior pager with any questions or concerns, 24/7.  [phone]    Subjective/Interval events:  Tolerated wean and CPAP trial for > 4 hours.  Main problem seems to be tachypnea/becoming anxious without pressure support.  Required 10 mg Oxycodone through OG overnight q 3.5 hrs for agitation, when awake is thrashing around and appears uncomfortable.  She opens her eye to verbal and physical stimulation.  Fentanyl at 150 mcg/hr, Versed at 4 mg/hr.  When calm, HR down to 60s.  Still slipping in and out of A. Fib at times, when in NSR does have PACs.      Objective  Vital signs in last 24 hours  Temp:  [98.1  F (36.7  C)-100  F (37.8  C)] 98.8  F (37.1  C)  Heart Rate:  [] 97  Resp:  [20-32] 24  BP: (144-184)/(67-95) 152/94  FiO2 (%):  [30 %] 30 %  160 lb 1.6 oz (72.6 kg)     Physical Exam  General:  Appears uncomfortable, moving around a lot in bed, appears anxious.     Respiratory:  Lung sounds are clear on inspiration in the anterior thorax, with faint wheezes on the right.  No crackles noted.    Cardiovascular:  Heart regular rate and rhythm, no clicks, murmurs, or rubs noted.  Abdomen:  Non-tender, non-distended abdomen.  Bowel sounds are present.  Extremities:  Left hip wound dressings from surgery changed on 7/15, clean and not draining today.  No lower extremity edema noted bilaterally.    Intake/Output last 3 shifts  I/O last 3 completed shifts:  In: 2147 [I.V.:895; NG/GT:752; IV Piggyback:500]  Out: 4975 [Urine:4225; Stool:750]     Pertinent Labs     Results from last 7 days  Lab Units 07/16/17  0540 07/15/17  0514 07/14/17  1137 07/14/17  0307   LN-WHITE BLOOD CELL COUNT thou/uL 7.6 5.6  --  7.1   LN-HEMOGLOBIN g/dL 9.2* 9.0* 8.4* 8.4*   LN-HEMATOCRIT % 27.9* 27.3*  --  26.3*    LN-PLATELET COUNT thou/uL 103* 101*  --  103*       Results from last 7 days  Lab Units 07/16/17  0540 07/15/17  0514 07/14/17  0307   LN-SODIUM mmol/L 149* 146* 143  143   LN-POTASSIUM mmol/L 3.8 3.8 4.1  4.1   LN-CHLORIDE mmol/L 107 107 107  107   LN-CO2 mmol/L 29 28 25  25   LN-BLOOD UREA NITROGEN mg/dL 89* 90* 76*  76*   LN-CREATININE mg/dL 2.32* 2.94* 3.32*  3.32*   LN-CALCIUM mg/dL 8.7 8.4* 7.8*  7.8*   LN-ALBUMIN g/dL 3.9 3.5 2.7*  2.7*   LN-PROTEIN TOTAL g/dL 5.7* 5.4* 4.8*   LN-BILIRUBIN TOTAL mg/dL 0.8 0.6 0.3   LN-ALKALINE PHOSPHATASE U/L 49 52 53   LN-ALT (SGPT) U/L <6 <6 <6   LN-AST (SGOT) U/L 16 14 15       Results from last 7 days  Lab Units 07/16/17  0540 07/15/17  0514 07/14/17  0307  07/09/17  1440   LN-INR  1.14* 1.13* 1.14*  < > 1.01   LN-PARTIAL THROMBOPLASTIN TIME seconds  --   --   --   --  39*   < > = values in this interval not displayed.     -repeat Urinalysis on 7/13 (-) for nitrites and leukocyte esterase.  Previous urine culture from admission day had grown both E. Coli and Klebsiella.    Pertinent Radiology    CT Head on 7/12--no ischemia or hemorrhage.  Vasogenic edema and meningioma in left frontal lobe unchanged in size from MRI in January 2016.    Current Medications.   ? acetaminophen  1,000 mg Enteral Tube TID    Or   ? acetaminophen  650 mg Rectal TID   ? chlorhexidine  15 mL Topical Q12H   ? gabapentin  100 mg Enteral Tube QHS   ? insulin aspart (NovoLOG) injection   Subcutaneous Q6H   ? ipratropium-albuterol  3 mL Nebulization QID - RT   ? levETIRAcetam  (KEPPRA) solution 100 mg/mL  500 mg Enteral Tube BID    Or   ? levETIRAcetam (KEPPRA) IVPB  500 mg Intravenous BID   ? methylPREDNISolone sodium succinate  40 mg Intravenous Q6H   ? omeprazole  20 mg Enteral Tube QHS    Or   ? omeprazole  20 mg Oral QHS    Or   ? pantoprazole  40 mg Intravenous QHS   ? piperacillin-tazobactam (ZOSYN) IV  3.375 g Intravenous Q12H   ? polyethylene glycol  17 g Oral DAILY   ? rOPINIRole   2 mg Enteral Tube QHS   ? senna-docusate  1 tablet Oral BID    Or   ? senna (SENOKOT) syrup  8.8 mg Enteral Tube BID   ? sodium chloride  10-30 mL Intravenous Q8H FIXED TIMES   ? sodium chloride  3 mL Intravenous Line Care   ? vecuronium (NORCURON) syringe 1 mg/1 mL  0.1 mg/kg Intravenous Once   ? white petrolatum-mineral oil   Both Eyes Q8H     PRN:acetaminophen, albuterol, aluminum-magnesium hydroxide-simethicone, bacitracin, benzocaine-menthol, bisacodyl, dextrose 50 % (D50W), fentaNYL 1500 mcg/150 mL in NS (10 mcg/ml), fentaNYL pf, glucagon (human recombinant), hydrALAZINE, HYDROmorphone, insulin regular infusion 0.5 unit/mL, lidocaine, lipase-protease-amylase **AND** sodium bicarbonate, loratadine, LORazepam, metoclopramide **OR** metoclopramide **OR** metoclopramide, naloxone **OR** naloxone, ondansetron **OR** ondansetron, oxyCODONE, polyvinyl alcohol, sodium chloride, sodium chloride, sodium chloride, sodium phosphates 133 mL    This note was created with help of Dragon dictation system. Grammatical /typing errors are not intentional.

## 2021-06-25 NOTE — PROGRESS NOTES
Progress Notes by Manuel Levy MD at 7/12/2017  7:08 AM     Author: Manuel Levy MD Service: Resident Author Type: Resident    Filed: 7/12/2017  2:08 PM Date of Service: 7/12/2017  7:08 AM Status: Attested    : Manuel Levy MD (Resident) Cosigner: Ariana Bar MD at 7/13/2017  8:16 AM    Attestation signed by Ariana Bar MD at 7/13/2017  8:16 AM    [facility] Faculty Attestation    I have seen and examined the patient.    I have discussed the case with the resident physician, Dr. Levy, and I assessed the patient on 7/12/2017.    I agree with the findings, assessment and plan.    Ariana Bar MD                    [employer] Daily Progress Note    Assessment & Plan: Jhoana Ramirez is a 83 y.o. female with a past medical history of tobacco dependence, COPD, hypertension, and macrocytic anemia who was admitted secondary to a fall yesterday and found to have a left hip fracture.    Principal Problem:    Hip fracture  Active Problems:    Benign essential hypertension    Moderate COPD (chronic obstructive pulmonary disease)    Macrocytic anemia    Acute postoperative respiratory failure    Respiratory failure, post-operative    Acute on chronic kidney failure    Pneumonia of right lower lobe due to infectious organism    UTI (urinary tract infection)    Single kidney    Acute post-op respiratory failure:  Had to be intubated multiple times on 7/10 following surgery for her hip.  Blood pressures were low as well, given 2 doses of phenylephrine.  Patient was extubated for the second time and could not protect her airway, with acidotic ABGs and decision was made to intubate again and transfer to ICU.  ICU team managing, thinking differential at this time is wide and could be related to sedation, cerebral ischemia, undiagnosed status epilepticus.  Could be caused by or at least greatly exacerbated by her baseline lung function with COPD.   - remains intubated.   Given failure of spontaneous breathing trials, will not attempt wean and extubation today.  Precedex switched to Versed for sedation, Fentanyl for pain.  More comfortable, with arm restraints.  - Budesonide and Formoterol neb solutions.  - Lasix 80 mg q 8hrs.  - Tylenol for post-op pain; avoiding excessive narcotics given relative sedation and difficulty with respiration.  - Novolog SSI for blood glucose control.  - Protonix for PPI prophylaxis.  - Zosyn for empiric pneumonia coverage.  - Ropinirole 2 mg  - Methylprednisolone IV q 6 hrs.  - IVF bolus if continued low pressures; goal MAP > 65.  - continue monitoring CBC due to acute on chronic anemia.  - have had extensive conversations with son, Elvin, about status of his mother.  He is aware of situation and will be coming to the hospital, aware of need to discuss goals of care from here.    Left trochanteric hip fracture, in setting of unwitnessed fall:        - IM nailing procedure on 7/11.  Tylenol being given for pain, patient is also receiving Fentanyl.  - initial laboratory workup for possible causes of fall have been unrevealing.  Will follow-up urine culture as there was some question about leukocyte esterase on the urinalysis.  - there could possibly be a component of this new-onset peripheral edema causing unsteady gate; will hold Norvasc here in the hospital.    - BNP mildly elevated on presentation; now at 191.  - last Echo in 2016 showing LVEF of 65% with mild pulmonary hypertension.  - Echo repeated here with LVEF of 60 % with mild to moderate RV enlargement/mildly decreased right ventricular systolic function.     COPD: on 2 L/min of home O2 per nasal cannula 24 hours per day.  - uses Symbicort 2 puffs BID and Albuterol inhaler PRN at home.   - breathing treatments as stated above while on ventilator.    Hypertension: has been hypotensive at times here in the hospital.  On Norvasc 10 mg daily at home.    - holding Norvasc here.  See  "above.    Macrocytic Anemia: appears to be chronic in nature, as her most recent Hemoglobin readings here in the hospital over the last year range from the 8s to low 10s.  There could possibly be a component of acute blood loss given the hip fracture.  - peripheral smear showing a normocytic macrocytosis and thrombocytopenia.  - Type and cross in case needing transfusion.  If patient is symptomatic or Hgb drops below 7.0, can transfuse 1 unit pRBCs.  - being supplemented with iron sucrose through the IV.     Hx of seizures:  Reportedly taking Keppra but had told admitting provider that she used them \"as needed\".  Will update son on patient status and ask about seizures.    Diet: OG tube in place, tube feedings currently.   Fluids: NS 25 ml/hr.   VTE Prophylaxis: None.    Discharge Planning discussed with [facility] Medicine Service.  Barriers to discharge:  Medical - pending extubation and recovery from surgery.  Anticipated discharge day: TBD.  Disposition:  acute rehab  Status: inpatient admit  Length of Stay: 3     Patient discussed with attending [facility] Medicine physician, Dr.Amanda Bar, who agrees with the plan.     Manuel Levy PGY1 7/12/2017  [facility]   Pager: [phone] (from 8AM-5:30PM)    This is a [facility] Patient.  Please call the senior pager with any questions or concerns, 24/7.  [phone]    Subjective/Interval events:  Per nurseSofi, patient unable to pass spontaneous breathing trial yesterday and again failed this morning.  Will not be attempting extubation today.  ABG's continue to normalize.    Objective  Vital signs in last 24 hours  Temp:  [96.5  F (35.8  C)-98  F (36.7  C)] 96.7  F (35.9  C)  Heart Rate:  [51-86] 75  Resp:  [16-40] 18  BP: ()/(50-71) 128/63  FiO2 (%):  [45 %] 45 %  152 lb 9.6 oz (69.2 kg)     Physical Exam  General:  Unable to arouse, will move eyes with fingernail bed pressure and to her name.  Able to " squeeze my hand.  Will not open eyes on command though.      Respiratory:  Lung sounds are clear bilaterally in the anterior lung fields.  I cannot appreciate any wheezes or crackles over the vent.  Cardiovascular:  Heart regular rate and rhythm.  No clicks, murmurs, or rubs.      Abdomen:  Soft, non-distended.  Extremities:  No lower extremity edema.    Intake/Output last 3 shifts  I/O last 3 completed shifts:  In: 3490.9 [I.V.:2520.9; Blood:350; NG/GT:120; IV Piggyback:500]  Out: 2600 [Urine:2600]     Pertinent Labs     Results from last 7 days  Lab Units 07/12/17  0454 07/11/17  1052 07/11/17  0423   LN-WHITE BLOOD CELL COUNT thou/uL 3.8* 3.3* 4.1   LN-HEMOGLOBIN g/dL 9.3* 7.3* 7.8*   LN-HEMATOCRIT % 26.9* 22.9* 24.9*   LN-PLATELET COUNT thou/uL 113* 108* 105*       Results from last 7 days  Lab Units 07/12/17  0454 07/11/17  2016 07/11/17  1052  07/09/17  1440   LN-SODIUM mmol/L 144 142 143  < > 142   LN-POTASSIUM mmol/L 4.7 4.8 5.6*  < > 5.3*   LN-CHLORIDE mmol/L 110* 111* 115*  < > 107   LN-CO2 mmol/L 22 20* 18*  < > 27   LN-BLOOD UREA NITROGEN mg/dL 45* 42* 37*  < > 19   LN-CREATININE mg/dL 2.35* 2.21* 2.04*  < > 1.66*   LN-CALCIUM mg/dL 8.2* 8.1* 7.6*  < > 8.4*   LN-ALBUMIN g/dL 2.6*  --   --   --  3.1*   LN-PROTEIN TOTAL g/dL 4.8*  --   --   --  5.8*   LN-BILIRUBIN TOTAL mg/dL 0.5  --   --   --  0.4   LN-ALKALINE PHOSPHATASE U/L 53  --   --   --  77   LN-ALT (SGPT) U/L <6  --   --   --  <6   LN-AST (SGOT) U/L 10  --   --   --  12   < > = values in this interval not displayed.    Results from last 7 days  Lab Units 07/12/17  0454 07/11/17  0423 07/09/17  1440   LN-INR  1.17* 1.24* 1.01   LN-PARTIAL THROMBOPLASTIN TIME seconds  --   --  39*     ABG:  Recent Labs      07/10/17   1834   07/12/17   0921   PHART  7.08*   < >  7.44   OXYHB  81.7*   < >  96.4*   BEARTCALC  -6.6   < >  1.0   POCPEEP  6   < >  5   TEMP  37.0   < >  37.0   POCRATE  18   < >  10   PSV  14   --    --     < > = values in this interval  not displayed.     -UA negative for nitrites, small leukocyte esterase.  -Urine culture with > 100,000 E. Coli.  -H&H and CMP as above.  -Magnesium 1.3 on admission; repeat on 7/10 was 2.0.  -Troponin (-) x 3.  -CK-MB 2 (wnl), CK total 41 (wnl).  -.  -Ferritin 178 (H), Iron 25 (L), Transferrin 151 (L)  -Reticulocytes 0.039.  -Peripheral blood smear with macrocytosis (normocytic) and thrombocytopenia.       Pertinent Radiology    CT Chest repeated 7/11, showing no pneumothorax, ET tube in place, and small bilateral pleural effusions at the lung bases.    Current Medications.   ? acetaminophen  1,000 mg Enteral Tube TID    Or   ? acetaminophen  650 mg Rectal TID   ? budesonide  0.5 mg Nebulization BID - RT   ? chlorhexidine  15 mL Topical Q12H   ? formoterol fumarate  20 mcg Nebulization Q12H   ? furosemide  80 mg Intravenous Q8H   ? gabapentin  100 mg Enteral Tube QHS   ? insulin aspart (NovoLOG) injection   Subcutaneous Q6H   ? ipratropium-albuterol  3 mL Nebulization Q4H - RT   ? iron sucrose (VENOFER) IVPB  100 mg Intravenous Q24H   ? levETIRAcetam  (KEPPRA) solution 100 mg/mL  500 mg Enteral Tube BID    Or   ? levETIRAcetam (KEPPRA) IVPB  500 mg Intravenous BID   ? methylPREDNISolone sodium succinate  60 mg Intravenous Q6H   ? omeprazole  20 mg Enteral Tube QHS    Or   ? omeprazole  20 mg Oral QHS    Or   ? pantoprazole  40 mg Intravenous QHS   ? piperacillin-tazobactam (ZOSYN) IV  3.375 g Intravenous Q12H   ? polyethylene glycol  17 g Oral DAILY   ? rOPINIRole  2 mg Enteral Tube QHS   ? senna-docusate  1 tablet Oral BID    Or   ? senna (SENOKOT) syrup  8.8 mg Enteral Tube BID   ? sodium chloride  10-30 mL Intravenous Q8H FIXED TIMES   ? sodium chloride  3 mL Intravenous Line Care   ? sodium polystyrene sulfonate  30 g Enteral Tube Once   ? vecuronium (NORCURON) syringe 1 mg/1 mL  0.1 mg/kg Intravenous Once   ? viscous lidocaine HC  15 mL Oral Once   ? white petrolatum-mineral oil   Both Eyes Q8H      PRN:acetaminophen, albuterol, aluminum-magnesium hydroxide-simethicone, bacitracin, benzocaine-menthol, bisacodyl, dextrose 50 % (D50W), fentaNYL 1500 mcg/150 mL in NS (10 mcg/ml), fentaNYL pf, glucagon (human recombinant), HYDROmorphone, insulin regular infusion 0.5 unit/mL, lidocaine, lipase-protease-amylase **AND** sodium bicarbonate, loratadine, LORazepam, metoclopramide **OR** metoclopramide **OR** metoclopramide, naloxone **OR** naloxone, ondansetron **OR** ondansetron, oxyCODONE, polyvinyl alcohol, sodium chloride, sodium chloride, sodium chloride, sodium phosphates 133 mL    This note was created with help of Dragon dictation system. Grammatical /typing errors are not intentional.

## 2021-06-25 NOTE — PROGRESS NOTES
Progress Notes by Manuel Levy MD at 7/15/2017  6:58 AM     Author: Manuel Levy MD Service: Resident Author Type: Resident    Filed: 7/15/2017  7:18 PM Date of Service: 7/15/2017  6:58 AM Status: Attested    : Manuel Levy MD (Resident) Cosigner: Ariana Bar MD at 7/16/2017  8:06 AM    Attestation signed by Ariana Bar MD at 7/16/2017  8:06 AM    [facility] Faculty Attestation    I have seen and examined the patient.    I have discussed the case with the resident physician, Dr. Levy, and I assessed the patient on 7/15/2017.    I agree with the findings, assessment and plan.    Ariana Bar MD                    [facility] Daily Progress Note    Assessment & Plan: Jhoana Ramirez is a 83 y.o. female with a past medical history of tobacco dependence, COPD, hypertension, and macrocytic anemia who was admitted secondary to a fall yesterday and found to have a left hip fracture.    Principal Problem:    Hip fracture  Active Problems:    Benign essential hypertension    Moderate COPD (chronic obstructive pulmonary disease)    Macrocytic anemia    Acute postoperative respiratory failure    Respiratory failure, post-operative    Acute on chronic kidney failure    Pneumonia of right lower lobe due to infectious organism    UTI (urinary tract infection)    Single kidney    Meningioma    Hip fracture, left, closed, with routine healing, subsequent encounter    Acute post-op respiratory failure:  Had to be intubated multiple times on 7/10 following surgery for her hip, received 2 doses phenylephrine.  Patient was extubated for the second time and could not protect her airway, with acidotic ABGs and decision was made to intubate again and transfer to ICU.    -ICU team continues to manage, respiratory failure most likely secondary to underlying COPD and oversedation.  Repeat CT head on 7/12 did not show new infarct or hemorrhage, and the patient has moments where she  is more alert, making status epilepticus less likely.  Pleural effusions were initially noticed bilaterally on chest x-ray, so Lasix was started and continued in the ICU.  Patient's creatinine continued to trend upward and clinically patient was deemed to be euvolemic, so the Lasix was discontinued.  - remains intubated on Fentanyl and Versed.  Also receiving vecuronium.  - Budesonide and Formoterol neb solutions for breathing support.  - Tylenol for post-op pain; avoiding excessive narcotics given relative sedation and difficulty with respiration.  - Novolog SSI for blood glucose control; Likely being exacerbated by the IV methylpred she is receiving.  - Protonix for PPI prophylaxis.  - continues on Zosyn for empiric pneumonia coverage, given on ventilator and with concern for aspiration.  - Ropinirole 2 mg.  - Methylprednisolone IV q 6 hrs.  - IVF bolus if continued low pressures; goal MAP > 65.  .  - hemoglobin levels have been stable; continue to monitor.  -Repeat EKG on 7/18 showed resolution of the ST elevation, and the troponin that was drawn was negative.     - has been switching in and out of Atrial Fibrillation on tele.  On Heparin SQ for now.  Will continue to monitor.    -Continue to attempt to wean off sedation and extubation when able to tolerate.  -Goals of care discussion.    Left trochanteric hip fracture, in setting of unwitnessed fall:        - IM nailing procedure on 7/11.  Tylenol and Fentanyl for pain.  - initial laboratory workup for possible causes of fall have been unrevealing.  Urine culture grew E. Coli and Klebsiella; repeat urinalysis (-) for nitrites and LE.  - there could possibly be a component of this new-onset peripheral edema causing unsteady gate; will hold Norvasc here in the hospital.    - BNP mildly elevated on presentation; most recent on 7/12 at 191.  - last Echo in 2016 showing LVEF of 65% with mild pulmonary hypertension.  - Echo repeated here in hospital with LVEF of 60 %  "with mild to moderate RV enlargement/mildly decreased right ventricular systolic function.   - No longer receiving Lasix.  Ortho following.    COPD: on 2 L/min of home O2 per nasal cannula 24 hours per day.  - uses Symbicort 2 puffs BID and Albuterol inhaler PRN at home.     Hypertension: On Norvasc 10 mg daily at home.    - holding Norvasc here, managing pressures as above.    Macrocytic Anemia: appears to be chronic in nature, as her most recent Hemoglobin readings here in the hospital over the last year range from the 8s to low 10s.  There could have initially been a component of acute blood loss with hip fracture, but at this point appears to be mostly related to her chronic anemia.  - peripheral smear showing a macrocytosis and thrombocytopenia.  - If patient is symptomatic or Hgb drops below 7.0, can transfuse 1 unit pRBCs.  - being supplemented with iron sucrose through the IV.  - recent hemoglobin checks continue to be stable.  - Nephrology following and thinking possibly related to CKD.    Meningioma:  Reportedly taking Keppra but had told admitting provider that she used them \"as needed\".  Most likely on the Keppra due to chronic meningioma.   - Repeat CT head showing meningioma and mass effect, likely unchanged from MRI.  - Neurosurgery consulted; patient hospitalized for this meningioma in 2016 and family decided on non-operative approach.    Diet: OG tube in place, tube feedings currently.   Fluids: NS 25 ml/hr.   VTE Prophylaxis: Heparin SQ injections.    Discharge Planning discussed with [facility].  Barriers to discharge:  Medical - pending extubation and recovery from surgery.  Anticipated discharge day: TBD.  Disposition:  acute rehab  Status: inpatient admit  Length of Stay: 6     Patient discussed with attending [facility] physician, Dr.Amanda Bar, who agrees with the plan.     Manuel Levy PGY1 7/15/2017  [facility]   Pager: " [phone] (from 8AM-5:30PM)    This is a [facility] Patient.  Please call the senior pager with any questions or concerns, 24/7.  [phone]    Subjective/Interval events:  Patient tolerated weaning well yesterday afternoon and evening, but was put back on full vent support for the evening.  Patient able to shake head no when asked if in any discomfort.  No fevers overnight.  Blood pressures remaining stable.  Patient nodded head yes when I informed her I would attempt to contact son Elvin.    Objective  Vital signs in last 24 hours  Temp:  [98.1  F (36.7  C)-100  F (37.8  C)] 100  F (37.8  C)  Heart Rate:  [] 97  Resp:  [18-30] 27  BP: (142-182)/(69-98) 170/79  FiO2 (%):  [30 %-100 %] 30 %  160 lb 1.6 oz (72.6 kg)     Physical Exam  General: Awake, able to respond to questions with head nod of yes or no.  This is the most interactive I have seen her since being intubated.  Not as agitated as the day prior.       Respiratory: Mildly coarse breath sounds bilaterally in the anterior thorax, however crackles noted on yesterday's exam are improved.  Cardiovascular: Heart is regular rate and irregular rhythm, without clicks, murmurs, or rubs noted.  Abdomen: Non--tender, non--distended.  Bowel sounds are present.     Extremities: No lower extremity edema noted on exam bilaterally.  Wounds from left hip arthroplasty with some serosanguineous drainage, but do not appear soiled.    Intake/Output last 3 shifts  I/O last 3 completed shifts:  In: 3760.8 [I.V.:798.8; NG/GT:1862; IV Piggyback:1100]  Out: 4400 [Urine:3550; Stool:850]     Pertinent Labs     Results from last 7 days  Lab Units 07/15/17  0514 07/14/17  1137 07/14/17  0307  07/13/17  0429   LN-WHITE BLOOD CELL COUNT thou/uL 5.6  --  7.1  --  7.5   LN-HEMOGLOBIN g/dL 9.0* 8.4* 8.4*  < > 9.0*   LN-HEMATOCRIT % 27.3*  --  26.3*  --  25.8*   LN-PLATELET COUNT thou/uL 101*  --  103*  --  111*   < > = values in this interval not displayed.    Results  from last 7 days  Lab Units 07/15/17  0514 07/14/17 0307 07/13/17  0429   LN-SODIUM mmol/L 146* 143  143 144   LN-POTASSIUM mmol/L 3.8 4.1  4.1 4.0   LN-CHLORIDE mmol/L 107 107  107 108*   LN-CO2 mmol/L 28 25  25 26   LN-BLOOD UREA NITROGEN mg/dL 90* 76*  76* 56*   LN-CREATININE mg/dL 2.94* 3.32*  3.32* 2.77*   LN-CALCIUM mg/dL 8.4* 7.8*  7.8* 8.0*   LN-ALBUMIN g/dL 3.5 2.7*  2.7* 2.5*   LN-PROTEIN TOTAL g/dL 5.4* 4.8* 4.7*   LN-BILIRUBIN TOTAL mg/dL 0.6 0.3 0.4   LN-ALKALINE PHOSPHATASE U/L 52 53 49   LN-ALT (SGPT) U/L <6 <6 <6   LN-AST (SGOT) U/L 14 15 10       Results from last 7 days  Lab Units 07/15/17  0514 07/14/17  0307 07/13/17  0429  07/09/17  1440   LN-INR  1.13* 1.14* 1.15*  < > 1.01   LN-PARTIAL THROMBOPLASTIN TIME seconds  --   --   --   --  39*   < > = values in this interval not displayed.  ABG:  No results for input(s): PHART, OXYHB, BEARTCALC, CARBOXYHGB, METHGB, POCPEEP, TEMP, 22236, POCRATE, POCFLOW, PSV in the last 72 hours.    Invalid input(s): P02ART, [id number], 02SAT, VENTTIVOL  -repeat Urinalysis on 7/13 (-) for nitrites and leukocyte esterase.  Previous urine culture from admission day had grown both E. Coli and Klebsiella.    Pertinent Radiology    CT Head on 7/12--no ischemia or hemorrhage.  Vasogenic edema and meningioma in left frontal lobe unchanged in size from MRI in January 2016.    Current Medications.   ? acetaminophen  1,000 mg Enteral Tube TID    Or   ? acetaminophen  650 mg Rectal TID   ? chlorhexidine  15 mL Topical Q12H   ? gabapentin  100 mg Enteral Tube QHS   ? insulin aspart (NovoLOG) injection   Subcutaneous Q6H   ? ipratropium-albuterol  3 mL Nebulization QID - RT   ? levETIRAcetam  (KEPPRA) solution 100 mg/mL  500 mg Enteral Tube BID    Or   ? levETIRAcetam (KEPPRA) IVPB  500 mg Intravenous BID   ? methylPREDNISolone sodium succinate  40 mg Intravenous Q6H   ? omeprazole  20 mg Enteral Tube QHS    Or   ? omeprazole  20 mg Oral QHS    Or   ? pantoprazole  40  mg Intravenous QHS   ? piperacillin-tazobactam (ZOSYN) IV  3.375 g Intravenous Q12H   ? polyethylene glycol  17 g Oral DAILY   ? rOPINIRole  2 mg Enteral Tube QHS   ? senna-docusate  1 tablet Oral BID    Or   ? senna (SENOKOT) syrup  8.8 mg Enteral Tube BID   ? sodium chloride  10-30 mL Intravenous Q8H FIXED TIMES   ? sodium chloride  3 mL Intravenous Line Care   ? vecuronium (NORCURON) syringe 1 mg/1 mL  0.1 mg/kg Intravenous Once   ? white petrolatum-mineral oil   Both Eyes Q8H     PRN:acetaminophen, albuterol, aluminum-magnesium hydroxide-simethicone, bacitracin, benzocaine-menthol, bisacodyl, dextrose 50 % (D50W), fentaNYL 1500 mcg/150 mL in NS (10 mcg/ml), fentaNYL pf, glucagon (human recombinant), hydrALAZINE, HYDROmorphone, insulin regular infusion 0.5 unit/mL, lidocaine, lipase-protease-amylase **AND** sodium bicarbonate, loratadine, LORazepam, metoclopramide **OR** metoclopramide **OR** metoclopramide, naloxone **OR** naloxone, ondansetron **OR** ondansetron, oxyCODONE, polyvinyl alcohol, sodium chloride, sodium chloride, sodium chloride, sodium phosphates 133 mL    This note was created with help of Dragon dictation system. Grammatical /typing errors are not intentional.

## 2021-06-25 NOTE — PROGRESS NOTES
Progress Notes by Manuel Levy MD at 7/11/2017  7:08 AM     Author: Manuel Levy MD Service: Resident Author Type: Resident    Filed: 7/11/2017  1:33 PM Date of Service: 7/11/2017  7:08 AM Status: Attested    : Manuel Levy MD (Resident) Cosigner: Ariana Bar MD at 7/11/2017  3:26 PM    Attestation signed by Ariana Bar MD at 7/11/2017  3:26 PM    [facility] Faculty Attestation    I have seen and examined the patient.    I have discussed the case with the resident physician, Dr. Levy, and I assessed the patient on 7/11/2017.    I agree with the findings, assessment and plan.    Ariana Bar MD                    [facility] Daily Progress Note    Assessment & Plan: Jhoana Ramirez is a 83 y.o. female with a past medical history of tobacco dependence, COPD, hypertension, and macrocytic anemia who was admitted secondary to a fall yesterday and found to have a left hip fracture.    Principal Problem:    Hip fracture  Active Problems:    Hypertension    Chronic Obstructive Pulmonary Disease    Macrocytic anemia    Pneumothorax on right    Acute postoperative respiratory failure    Respiratory failure, post-operative    Acute post-op respiratory failure:  Had to be intubated multiple times yesterday following surgery for her hip.  Blood pressures were low as well, given 2 doses of phenylephrine.  Patient was extubated for the second time and could not protect her airway, with acidotic ABGs and decision was made to intubate again and transfer to ICU.    - currently intubated.  Plan for wean of sedation and extubation if able to consistently breathe over the vent.  - Budesonide and Formoterol neb solutions.  - Tylenol for post-op pain; avoiding excessive narcotics given relative sedation and difficulty with respiration.  - Novolog SSI for blood glucose control.  - Protonix for PPI prophylaxis.  - Zosyn for empiric antibiotic coverage.  - Ropinirole 2 mg  -  Methylprednisolone IV q 6 hrs.  - Precedex and fentanyl being used for pain control.  - IVF bolus if continued low pressures; goal MAP > 65.  - continue monitoring CBC due to acute on chronic anemia.  - have had extensive conversations with son, Elvin, about status of his mother.  He is aware of situation and will be coming to the hospital, aware of need to discuss goals of care from here.    Left trochanteric hip fracture:  sustained after an unwitnessed fall.      - IM nailing procedure this afternoon.  - initial laboratory workup for possible causes of fall have been unrevealing.  Will follow-up urine culture as there was some question about leukocyte esterase on the urinalysis.  - there could possibly be a component of this new-onset peripheral edema causing unsteady gate; will hold Norvasc here in the hospital.  Will look for last Echo; BNP mildly elevated on presentation.     COPD: on 2 L/min of home O2 per nasal cannula 24 hours per day.  - uses Symbicort 2 puffs BID and Albuterol inhaler PRN.   - breathing treatments as stated above while on ventilator.    Hypertension: has been hypotensive at times here in the hospital.  On Norvasc 10 mg daily at home.    - holding Norvasc here.  See above.  - Norvasc could be contributing to her peripheral edema and possibly the fall.  Can consider restarting blood pressure medication here if pressures creep back up.     Macrocytic Anemia: appears to be chronic in nature, as her most recent Hemoglobin readings here in the hospital over the last year range from the 8s to low 10s.  There could possibly be a component of acute blood loss given the hip fracture.  - peripheral smear showing a normocytic macrocytosis and thrombocytopenia.  - Type and cross in case needing transfusion.  If patient is symptomatic or Hgb drops below 7.0, can transfuse 1 unit pRBCs.     Hx of seizures:  Will need to assess with patient more; reportedly taking Keppra but had told admitting provider  "that she used them \"as needed\".  Will try to gather collateral information from son.    Diet: Currently NPO in ICU.   Fluids: NS 25 ml/hr.   VTE Prophylaxis: None.    Discharge Planning discussed with [facility].  Barriers to discharge:  Medical - pending extubation and recovery from surgery.  Anticipated discharge day: TBD.  Disposition:  acute rehab  Status: inpatient admit  Length of Stay: 2     Patient dicussed with attending [facility] physician, Dr.Amanda Bar, who agrees with the plan.     Manuel Levy PGY1 7/11/2017  [facility]   Pager: [phone] (from 8AM-5:30PM)    This is a [facility] Patient.  Please call the senior pager with any questions or concerns, 24/7.  [phone]    Subjective/Interval events:  Patient intubated and agitated when I saw her this morning.  Unable to communicate but is squeezing my hand strongly.  She appears very uncomfortable and is breathing over the vent at times, but then will become drowsy and become apneic.  Plan is to wean sedation, obtain chest CT, and extubate, but patient will need to be able to breathe spontaneously.      Objective  Vital signs in last 24 hours  Temp:  [97.4  F (36.3  C)-98.9  F (37.2  C)] 97.6  F (36.4  C)  Heart Rate:  [49-92] 54  Resp:  [5-41] 21  BP: ()/(46-90) 137/61  FiO2 (%):  [30 %-100 %] 45 %  149 lb 1.6 oz (67.6 kg)     Physical Exam  General:  Intubated, appears very agitated, trying to get words out.  Occasionally fighting the tube.    Respiratory:  Bilateral breath sounds are appreciated in the anterior lung fields, unable to appreciate wheezes or crackles.  Cardiovascular:  Heart regular rate and rhythm.  Abdomen:  Soft, no guarding.  Bowel sounds are present.     Intake/Output last 3 shifts  I/O last 3 completed shifts:  In: 4549 [I.V.:4099; Blood:350; IV Piggyback:100]  Out: 300 [Urine:300]     Pertinent Labs     Results from last 7 days  Lab Units " 07/11/17  1052 07/11/17  0423 07/10/17  2137 07/10/17  1605   LN-WHITE BLOOD CELL COUNT thou/uL 3.3* 4.1  --  7.6   LN-HEMOGLOBIN g/dL 7.3* 7.8* 8.1* 9.7*   LN-HEMATOCRIT % 22.9* 24.9*  --  31.9*   LN-PLATELET COUNT thou/uL 108* 105*  --  140       Results from last 7 days  Lab Units 07/11/17  1052 07/11/17  0423 07/10/17  2137 07/10/17  0314 07/09/17  1440   LN-SODIUM mmol/L 143  --  141 143 142   LN-POTASSIUM mmol/L 5.6* 5.6* 5.9* 5.4* 5.3*   LN-CHLORIDE mmol/L 115*  --  112* 108* 107   LN-CO2 mmol/L 18*  --  19* 25 27   LN-BLOOD UREA NITROGEN mg/dL 37*  --  31* 22 19   LN-CREATININE mg/dL 2.04*  --  2.02* 1.73* 1.66*   LN-CALCIUM mg/dL 7.6*  --  7.6* 8.1* 8.4*   LN-ALBUMIN g/dL  --   --   --   --  3.1*   LN-PROTEIN TOTAL g/dL  --   --   --   --  5.8*   LN-BILIRUBIN TOTAL mg/dL  --   --   --   --  0.4   LN-ALKALINE PHOSPHATASE U/L  --   --   --   --  77   LN-ALT (SGPT) U/L  --   --   --   --  <6   LN-AST (SGOT) U/L  --   --   --   --  12       Results from last 7 days  Lab Units 07/11/17  0423 07/09/17  1440   LN-INR  1.24* 1.01   LN-PARTIAL THROMBOPLASTIN TIME seconds  --  39*     ABG:  Recent Labs      07/10/17   1834   07/11/17   1154   PHART  7.08*   < >  7.29*   OXYHB  81.7*   < >  95.9   BEARTCALC  -6.6   < >  -3.8   POCPEEP  6   < >  5   TEMP  37.0   < >  37.0   POCRATE  18   < >  16   PSV  14   --    --     < > = values in this interval not displayed.     -UA negative for nitrites, small leukocyte esterase.  -Urine culture with > 100,000 E. Coli.  -H&H and CMP as above.  -Magnesium 1.3 on admission; repeat on 7/10 was 2.0.  -Troponin (-) x 3.  -CK-MB 2 (wnl), CK total 41 (wnl).  -.  -Ferritin 178 (H), Iron 25 (L), Transferrin 151 (L)  -Reticulocytes 0.039.  -Peripheral blood smear with macrocytosis (normocytic) and thrombocytopenia.       Pertinent Radiology    CT Chest repeated 7/11, showing no pneumothorax, ET tube in place, and small bilateral pleural effusions at the lung bases.    Current  Medications.   ? acetaminophen  1,000 mg Oral TID    Or   ? acetaminophen  650 mg Rectal TID   ? acetylcysteine (MUCOMYST) 20% inhalation solution  5 mL Inhalation Q4H - RT   ? budesonide  0.5 mg Nebulization BID - RT   ? chlorhexidine  15 mL Topical Q12H   ? formoterol fumarate  20 mcg Nebulization Q12H   ? gabapentin  100 mg Oral QHS   ? heparin (PF)  5,000 Units Subcutaneous Q12H 09-21   ? insulin aspart (NovoLOG) injection   Subcutaneous Q4H FIXED TIMES   ? ipratropium-albuterol  3 mL Nebulization Q4H - RT   ? levETIRAcetam (KEPPRA) IVPB  500 mg Intravenous Q12H   ? methylPREDNISolone sodium succinate  60 mg Intravenous Q6H   ? oxyCODONE  5 mg Oral Q6H   ? pantoprazole  40 mg Intravenous Q24H   ? piperacillin-tazobactam (ZOSYN) IV  3.375 g Intravenous Q8H   ? polyethylene glycol  17 g Oral DAILY   ? rOPINIRole  2 mg Oral QHS   ? senna-docusate  1 tablet Oral BID    Or   ? senna (SENOKOT) syrup  8.8 mg Enteral Tube BID   ? sodium chloride  3 mL Intravenous Line Care     PRN:acetaminophen, albuterol, aluminum-magnesium hydroxide-simethicone, benzocaine-menthol, bisacodyl, dextrose 50 % (D50W), fentaNYL 1500 mcg/150 mL in NS (10 mcg/ml), fentaNYL pf, glucagon (human recombinant), HYDROmorphone, insulin regular infusion 0.5 unit/mL, lidocaine, loratadine, LORazepam, metoclopramide **OR** metoclopramide **OR** metoclopramide, naloxone **OR** naloxone, ondansetron **OR** ondansetron, oxyCODONE, polyvinyl alcohol, sodium phosphates 133 mL    This note was created with help of Dragon dictation system. Grammatical /typing errors are not intentional.

## 2021-07-01 NOTE — H&P
H&P by Prabha Lima MD at 2017  7:11 PM     Author: Prabha Lmia MD Service: Family Medicine Author Type: Resident    Filed: 2017 10:04 PM Date of Service: 2017  7:11 PM Status: Attested    : Prabha Lima MD (Resident)    Related Notes: Original Note by Prabha Lima MD (Resident) filed at 2017  9:36 PM    Cosigner: Ariana Bar MD at 2017  7:39 AM    Attestation signed by Ariana Bar MD at 2017  7:39 AM    [facility] Faculty Attestation    Admit history was reviewed with the patient and confirmed.  My exam confirms that of the resident's, with the following key findings: elderly woman in some distress complaining of left leg pain, cap refill 1 sec in left toes, dorsal pedal pulses symmetric, currently RRR, CTAB but diminished breath sounds at bases, not oriented to time.  Laboratory and imaging studies were reviewed.    I have discussed the case with the resident physician, Dr. Lima, and I assessed the patient on 7/10/2017.    I agree with the findings, assessment and plan.    Ariana Bar MD                    Admission History and Physical   Jhoana Ramirez,  1934, MRN [id number]    [facility]  Pneumothorax on right [J93.9]  Generalized weakness [R53.1]  Fall, initial encounter [W19.XXXA]  Hip fracture, left, closed, initial encounter [S72.002A]  Chest pain, unspecified type [R07.9]    PCP: Sergey Lopez MD, [phone]   Code status:  DNR       Extended Emergency Contact Information  Primary Emergency Contact: Tanmay Ramirez   [facility]  Home Phone: [phone]  Relation: Child  Secondary Emergency Contact: Earline Ramirez   [facility]  Home Phone: [phone]  Mobile Phone: [phone]  Relation: Child       Chief Complaint: L hip pain     Assessment and Plan:   Jhoana Ramirez is a 83 y.o. old female with a past medical history of CAD, COPD, chronic low back pain, hypertension, history of  MI, history of CVA, CKD s/p nephrectomy, and meningioma who presented to the [facility] Emergency Department on the day of admission with complaints of L hip pain after fall, found to have L greater trochanteric fracture    L greater trochanteric fracture: Neurovascularly intact on exam. Discussed with Dr. Morin of [facility], as it has intertrochanteric involvement, is not sure if this will be operative or conservative management and will reassess in AM. Labs of note show hgb of 9.1, plt of 132, and INR of 1.01, so should be okay to proceed with surgery, although unclear how small pneumothorax will affect surgical management. Will avoid renally-cleared pain medication given GFR of 30.   - Ortho consult appreciate recs.   - Dilaudid 0.2-0.4 mg Q3H PRN  - NPO at midnight    Fall: Unknown etiology behind fall. CMP unremarkable for any electrolyte imbalance. May be mechanical as she has severe generalized weakness + dyspnea at baseline. Could possibly also be cardiac as hx of MI and CVA in past and had chest pain on initial presentation with normal EKG and troponin. Additional concern for neurologic causes such as seizure as she has known meningioma and was previously on Keppra although per last clinic notes from 06/2017 was taking it 'prn for tremors'. Last neuroimaging was MRI in 01/2016 showing meningioma with vasogenic edema and midline shift. No other signs of trauma on physical exam. Per family, could have been down for as long as 3 hours, however total CK normal and creatinine appears to be near baseline, so no concern for rhabdomyolysis at this time.   - CK total  - Cardiac monitoring  - Keppra 500 mg BID  - Will need PT/OT when able     R apical pneumothorax: Less than 2 cm at widest point, may conservatively manage at this time. Possibly 2/2 trauma or spontaneous 2/2 COPD.   -Supplemental O2 to >90%  -Continuous O2 monitoring  -Consider repeat CXR if worsening respiratory status    Macrocytic anemia:  Was seen a few weeks ago in clinic for fatigue, found to have chronic macrocytic anemia with normal folate, B12, and TSH. Hemoglobin has ranged from 8.8 to 10.4 since 03/2016 with stable MCV in low 100s. Hgb stable in this range on admission. Prior hemoglobins were 13-14 back in 2011. Had been prescribed iron in the past, does not look like she has taken this recently. Differential at this time includes alcohol use (did not ask much on initial interview) vs hemolysis vs medication effect. Iron deficiency less likely given macrocytosis, but possible given CKD, so will check iron labs as well.   - Reticulocytes, peripheral smear, ferritin, iron, TIBC  - AM CBC    Chest pain: Received nitro and ASA in ED. 2x normal EKGs, initial negative troponin. Significant cardiac history including hx MI.  - Troponin x3  - Cardiac monitoring   - hold home ASA given possible surgery in AM    COPD: On 2-4 lpm O2 at home per clinic notes. Hypoxic on initial presentation to mid 80s, however resolved with nasal cannula. Sons request home O2 eval on discharge  -Continue home symbicort and albuterol  -Will likely need updated home O2 evaluation on discharge    Hypomagnemesia: On PPI as outpatient. Received IV replacement in ED  - Mg in AM  - Switched PPI to H2 blocker     CKD Stage 3: Creat 1.66 with normal BUN (GFR 30) on admission. S/P nephrectomy. Creatinine has ranged from 1.5-2.2 in clinic over the last year. Normal total CK, no s/s to suggest dehydration.  -AM BMP    HTN: On amlodipine 10 mg as outpatient. SBPs in 80s on admission  -Will hold amlodipine for now given soft BPs on admission and possible surgery in AM. May resume if elevated BPs    GERD: On PPI as outpatient, hypomagnemesic on admission  -H2 blocker BID    Peripheral Edema: Noted on physical exam, worsened per family. No crackles on lung exam or signs of pulmonary edema on CXR. No medications at home. Slightly elevated BNP on admission. Last ECHO 02/2016 showed LVEF 65%  with mild pulm HTN. Possibly 2/2 trauma, peripheral vascular disease, or heart failure  -Continue to monitor    Restless Leg Syndrome: Continue home ropinirole    DNR/DNI: Sons have advance directive paperwork. Sons are surrogate decision makers.    FEN:  Fluids:none; Diet: general , NPO at midnight  PPX: Mechanical VTE Prophylaxis none needed  Disposition: pending recovery from surgery, may need TCU  Status: inpatient    Patient discussed with attending [facility] physician, Dr.David Christian, who agrees with the plan.     Prabha Janie PGY2 7/9/2017  [facility]   Pager: [phone] (from 8AM-5:30PM)    This is a [facility] Patient.  Please call the senior pager with any questions or concerns, 24/7.  [phone]    HPI:    Jhoana Ramirez is a 83 y.o. old female with a past medical history of CAD, COPD, chronic low back pain, hypertension, history of MI, history of CVA, CKD s/p nephrectomy who presented to the [facility] Emergency Department on the day of admission with complaints of L hip pain.      History is provided by patient, who is a fair historian, and sons who are good historians.    This morning, Jhoana had a fall.  She states she was up getting coffee.  She denies any dizziness, lightheadedness, or chest pain at the time, however is not sure what she fell or tripped on if anything.  She does not think she hit her head or lost consciousness, however is unsure how long she was on the ground.  Her son who lives with her found her approximately 9 am.  She did have an episode of urinary incontinence.  She was found down on her left hip.  Her son thought her mental status appeared normal at that time.  The son tried to get her up to ambulate, however she was unable to bear weight on her legs, so they called 911 for medical care.    ED course was significant for a fairly unremarkable BMP, CBC with chronic changes, normal coags.  She did have a slightly  "elevated BNP at 117.  She did endorse chest pain to the emergency room physician, and was given aspirin and nitro.  2 times EKGs were normal sinus rhythm without ST changes.  She had a negative troponin.  Imaging was notable for a small apical right pneumothorax on chest x-ray, and a left displaced greater trochanteric fracture with involvement of the intertrochanteric region.  ED physician notified orthopedics, who recommended admission.  She also received 4 mg of morphine and Zofran while in ED.    Patient very sleepy on admission interview. States she has 10/10 pain in her left buttock, otherwise denies pain elsewhere. Specifically denies pain in head, neck/shoulders, chest, abdomen, R hip, ankles. At baseline, per sons she is very unsteady both secondary to weakness and shortness of breath, and uses a walker and is only able to get from chair to couch or similar. They are wondering if she can get more oxygen at home. Sons also think her ankles are more swollen than usual. Patient denies dysuria, hematuria, and increased frequency of urination. She thinks her breathing has improved since coming in, but is not quite at her baseline.       Emergency Department Course:    Upon presentation to the Emergency Department the patient's vital signs were    ED Triage Vitals   Enc Vitals Group      BP 07/09/17 1400 107/57      Heart Rate 07/09/17 1405 76      Resp 07/09/17 1405 24      Temp 07/09/17 1405 97.9  F (36.6  C)      Temp Source 07/09/17 1405 Oral      SpO2 07/09/17 1405 80 %      Weight 07/09/17 1405 156 lb (70.8 kg)      Height 07/09/17 1405 5' 6\" (1.676 m)      Head Cir --       Peak Flow --       Pain Score --       Pain Loc --       Pain Edu? --       Excl. in GC? --      Initial evaluation in the Emergency Department includes CMP significant for mild hyperkalemia (5.3), creatinine of 1.66 with BUN of 19, unremarkable liver labs. CBC shows anemia with hgb of 9.1 and MCV of 106 and mild thrombocytopenia (132). " No leukocytosis. . CKMB and troponin WNL. APTT slightly elevated (39), INR normal at 1.01. Magnesium low at 1.3, received IV replacement. UA +pyuria, negative nitrites, and 25-50 squams,. + 2x EKGs show NSR without ST changes and QTc in 450s. Imaging significant for small apical R pneumothorax, equivocal XR of pelvis, CT showing fracture of L greater trochanter with intertrochanteric involvement. CT L spine without acute fracture, does show advanced degenerative changes incluidng severe foraminal narrowing at L4-S1 and a stable aortic aneurysm at 3.0 cm (unchanged from 03/2016).     Patient was admitted to the med/surg floor for further workup and management.     Medical History  Active Ambulatory (Non-Hospital) Problems    Diagnosis   ? Itching   ? Insomnia   ? Encephalopathy   ? Dyspnea   ? Weakness   ? Palliative care encounter   ? Delirium   ? Chronic respiratory failure   ? Cerebral edema   ? Brain herniation   ? Altered mental status   ? Encephalopathy acute   ? Acute on chronic kidney failure   ? Meningioma   ? Nicotine Dependence - Continuous   ? Hyperlipidemia   ? Hypertension   ? Coronary Artery Disease   ? Chronic Obstructive Pulmonary Disease   ? Esophageal Reflux   ? Osteoarthritis   ? Vertigo   ? Dependence on supplemental oxygen     Past Medical History:   Diagnosis Date   ? Acute on chronic kidney failure    ? CAD (coronary artery disease)    ? Cataract    ? Cerebral edema    ? COPD (chronic obstructive pulmonary disease)    ? Depression    ? Encephalopathy    ? Hypertension    ? Meningioma    ? Myocardial infarction    ? Osteoarthritis    ? Stroke        Reviewed, changes/additions include none.  Surgical History  She  has a past surgical history that includes Cholecystectomy; Nephrectomy; Cataract extraction; Partial knee arthroplasty; and PICC (1/29/2016).      Reviewed, changes/additions include none.  Social History & Habits  she  reports that she has quit smoking. Her smoking use  included Cigarettes. She has never used smokeless tobacco. She reports that she does not use illicit drugs.    Reviewed, changes/additions include none.     Code Status: do not resuscitate  Lives with son at home  Surrogate decision maker/POA: Both sons (numbers on whiteboard in patient room).         Allergies  Allergies   Allergen Reactions   ? Corticosteroids (Glucocorticoids)    ? Cortisone Acetate    ? Hydroxyzine Hcl     Family History  family history is not on file.    Reviewed, changes/additions include no significant family history   Psychosocial Needs  Social History     Social History Narrative     Additional psychosocial needs reviewed per nursing assessment.       Prior to Admission Medications   Prescriptions Prior to Admission   Medication Sig Dispense Refill Last Dose   ? albuterol (PROVENTIL HFA;VENTOLIN HFA) 90 mcg/actuation inhaler Inhale 1-2 puffs every 4 (four) hours as needed. Every 4-6 hours as needed.   Unknown at Unknown time   ? amLODIPine (NORVASC) 10 MG tablet Take 10 mg by mouth daily.   7/9/2017 at Unknown time   ? budesonide-formoterol (SYMBICORT) 80-4.5 mcg/actuation inhaler Inhale 2 puffs 2 (two) times a day.   7/9/2017 at Unknown time   ? levETIRAcetam (KEPPRA) 500 MG tablet Take 500 mg by mouth 2 (two) times a day.   7/9/2017 at Unknown time   ? omeprazole (PRILOSEC) 20 MG capsule Take 20 mg by mouth daily.   7/9/2017 at Unknown time   ? rOPINIRole (REQUIP) 2 MG tablet Take 2 mg by mouth at bedtime.   7/8/2017 at Unknown time            Review of Systems:    A 12 point comprehensive review of systems was negative except as noted. Physical Exam:  Temp:  [97.5  F (36.4  C)-97.9  F (36.6  C)] 97.5  F (36.4  C)  Heart Rate:  [76-92] 81  Resp:  [20-24] 20  BP: ()/(52-58) 100/52    General Appearance:    Sleepy but easily arousable, laying on L side, cooperative, no distress,    Head:    Normocephalic, without obvious abnormality, atraumatic   Eyes:    Pupils 4 mm bilaterally,  conjunctiva/corneas clear   Neck:   Supple, symmetrical, trachea midline, no adenopathy;    no tenderness over neck or clavicles bilaterally   Back:    Atraumatic, no skin changes, no tenderness to palpation   Lungs:    Oxymask on 4L. Decreased breath sounds bilaterally, otherwise clear to auscultation bilaterally   Chest Wall:    No tenderness or deformity    Heart:    Regular rate and rhythm, S1 and S2 normal, I/VI systolic crescendo murmur heard best at LLSB   Abdomen:     Soft, non-tender, bowel sounds active all four quadrants,     no masses,   Extremities:   R hip and buttock atraumatic, patient laying on L hip and declines to move. Able to move L knee and foot without difficulty, no loss of sensation, distal extremities are warm bilaterally. Ecchymoses noted on L palm, no other bruising noted. Does have R elbow effusion, no erythema or warmth, unchanged from prior per family. 2+ edema to mid lower legs bilaterally   Pulses:   2+ and symmetric pedal pulses   Skin:   Skin color, texture, turgor normal, no rashes or lesions except for minor ecchymoses noted above.   Lymph nodes:   Cervical, supraclavicular nodes normal   Neurologic:   Oriented to place, person, situation. Symmetric smile. Sensation intact to light touch across distal extremities        Pertinent Labs  Lab Results: personally reviewed.   Results for orders placed or performed during the hospital encounter of 07/09/17   Urinalysis-UC if Indicated   Result Value Ref Range    Color, UA Yellow Colorless, Yellow, Straw, Light Yellow    Clarity, UA Cloudy (!) Clear    Glucose, UA Negative Negative    Bilirubin, UA Negative Negative    Ketones, UA Negative Negative    Specific Gravity, UA 1.012 1.001 - 1.030    Blood, UA Negative Negative    pH, UA 5.5 4.5 - 8.0    Protein, UA 30 mg/dL (!) Negative mg/dL    Urobilinogen, UA <2.0 E.U./dL <2.0 E.U./dL, 2.0 E.U./dL    Nitrite, UA Negative Negative    Leukocytes, UA Small (!) Negative    Bacteria, UA Many  (!) None Seen hpf    RBC, UA 0-2 None Seen, 0-2 hpf    WBC, UA 5-10 (!) None Seen, 0-5 hpf    Squam Epithel, UA 25-50 (!) None Seen, 0-5 lpf    Mucus, UA Few (!) None Seen lpf   Comprehensive metabolic panel   Result Value Ref Range    Sodium 142 136 - 145 mmol/L    Potassium 5.3 (H) 3.5 - 5.0 mmol/L    Chloride 107 98 - 107 mmol/L    CO2 27 22 - 31 mmol/L    Anion Gap, Calculation 8 5 - 18 mmol/L    Glucose 109 70 - 125 mg/dL    BUN 19 8 - 28 mg/dL    Creatinine 1.66 (H) 0.60 - 1.10 mg/dL    GFR MDRD Af Amer 36 (L) >60 mL/min/1.73m2    GFR MDRD Non Af Amer 30 (L) >60 mL/min/1.73m2    Bilirubin, Total 0.4 0.0 - 1.0 mg/dL    Calcium 8.4 (L) 8.5 - 10.5 mg/dL    Protein, Total 5.8 (L) 6.0 - 8.0 g/dL    Albumin 3.1 (L) 3.5 - 5.0 g/dL    Alkaline Phosphatase 77 45 - 120 U/L    AST 12 0 - 40 U/L    ALT <6 0 - 45 U/L   Magnesium   Result Value Ref Range    Magnesium 1.3 (L) 1.8 - 2.6 mg/dL   Protime-INR   Result Value Ref Range    INR 1.01 0.90 - 1.10   APTT   Result Value Ref Range    PTT 39 (H) 24 - 37 seconds   Troponin I   Result Value Ref Range    Troponin I 0.01 0.00 - 0.29 ng/mL   CK MB   Result Value Ref Range    CK-MB 2 0 - 7 ng/mL   HM1 (CBC with Diff)   Result Value Ref Range    WBC 5.9 4.0 - 11.0 thou/uL    RBC 2.73 (L) 3.80 - 5.40 mill/uL    Hemoglobin 9.1 (L) 12.0 - 16.0 g/dL    Hematocrit 28.9 (L) 35.0 - 47.0 %     (H) 80 - 100 fL    MCH 33.3 27.0 - 34.0 pg    MCHC 31.5 (L) 32.0 - 36.0 g/dL    RDW 13.6 11.0 - 14.5 %    Platelets 132 (L) 140 - 440 thou/uL    MPV 10.2 8.5 - 12.5 fL    Neutrophils % 85 (H) 50 - 70 %    Lymphocytes % 8 (L) 20 - 40 %    Monocytes % 7 2 - 10 %    Eosinophils % 1 0 - 6 %    Basophils % 0 0 - 2 %    Neutrophils Absolute 5.0 2.0 - 7.7 thou/uL    Lymphocytes Absolute 0.5 (L) 0.8 - 4.4 thou/uL    Monocytes Absolute 0.4 0.0 - 0.9 thou/uL    Eosinophils Absolute 0.0 0.0 - 0.4 thou/uL    Basophils Absolute 0.0 0.0 - 0.2 thou/uL   B-type natriuretic peptide   Result Value Ref Range      0 - 167 pg/mL   ECG 12 lead nursing unit performed   Result Value Ref Range    SYSTOLIC BLOOD PRESSURE  mmHg    DIASTOLIC BLOOD PRESSURE  mmHg    VENTRICULAR RATE 93 BPM    ATRIAL RATE 93 BPM    P-R INTERVAL 148 ms    QRS DURATION 70 ms    Q-T INTERVAL 366 ms    QTC CALCULATION (BEZET) 455 ms    P Axis 70 degrees    R AXIS 84 degrees    T AXIS 81 degrees    MUSE DIAGNOSIS       Sinus rhythm  Normal ECG  When compared with ECG of 29-JAN-2016 10:43,  No significant change was found  Confirmed by CESARIO NICOLAS MD LOC:WW ([id number]) on 7/9/2017 4:08:03 PM     ECG 12 lead nursing unit performed   Result Value Ref Range    SYSTOLIC BLOOD PRESSURE  mmHg    DIASTOLIC BLOOD PRESSURE  mmHg    VENTRICULAR RATE 91 BPM    ATRIAL RATE 91 BPM    P-R INTERVAL 168 ms    QRS DURATION 84 ms    Q-T INTERVAL 372 ms    QTC CALCULATION (BEZET) 457 ms    P Axis 88 degrees    R AXIS 69 degrees    T AXIS 54 degrees    MUSE DIAGNOSIS       Sinus rhythm  Normal ECG  When compared with ECG of 09-JUL-2017 15:05,  No significant change was found  Confirmed by CESARIO NICOLAS MD LOC:WW ([id number]) on 7/9/2017 4:17:11 PM          Pertinent Radiology:  Radiology Results: personally reviewed.   Xr Hip Left 2 Or More Vws    Result Date: 7/9/2017  XR HIP LEFT 2 OR MORE VWS 7/9/2017 3:39 PM INDICATION: Hip pain.    COMPARISON: None. FINDINGS: Minimal sclerosis along the left femur intertrochanteric region and slight lucency along the greater trochanter, though no definitive fracture. If high suspicion, CT could be performed. No dislocation. Degenerative change of the spine, SI joints and hips.     Ct Lumbar Spine Without Contrast    Result Date: 7/9/2017  [facility] CT LUMBAR SPINE WO CONTRAST 7/9/2017 3:26 PM INDICATION: Fall. TECHNIQUE: Helical images were obtained through the lumbar spine and were evaluated in the axial plane with sagittal and coronal reformations. Dose reduction techniques were used. COMPARISON: 3/16/2016. FINDINGS:  Nomenclature is based on 5 lumbar type vertebral bodies. Vertebral body height loss at L2, L3, and L5 is unchanged from 3/16/2016. Lumbar dextroscoliosis. Lateral listhesis of L4 and L5. Grade 1 retrolisthesis L2 on L3 and L3 on L4. Grade 1 anterolisthesis L4 on L5. Right L5 pars defect, unchanged. Mild paraspinal muscular atrophy. Limited evaluation of the retroperitoneum reveals atherosclerotic disease of the abdominal aorta with dilatation measuring up to 3 cm. Several renal cysts are poorly characterized but appears similar to 3/16/2016. Right kidney is not identified. Mild degenerative changes in the SI joints. Visualized portions of the sacrum are unremarkable. T12-L1: Disc height maintained. Shallow disc bulge. Mild facet arthropathy. No spinal canal stenosis. No right neural foraminal stenosis. No left neural foraminal stenosis. L1-L2: Loss of disc height with vacuum disc phenomenon. Diffuse disc bulge. Mild facet arthropathy. No spinal canal stenosis. Mild right neural foraminal stenosis. No left neural foraminal stenosis. L2-L3: Loss of disc height. Diffuse disc bulge. Mild facet arthropathy. No spinal canal stenosis. No right neural foraminal stenosis. No left neural foraminal stenosis. L3-L4: Loss of disc height. Diffuse disc bulge. Moderate ligamentum flavum hypertrophy and facet arthropathy. Mild to moderate spinal canal stenosis. Mild right neural foraminal stenosis. Mild to moderate left neural foraminal stenosis. L4-L5: Loss of disc height and T2 signal. Diffuse calcified disc bulge. Moderate calcified ligamentum flavum hypertrophy and facet arthropathy. Moderate spinal canal stenosis with narrowing of the right lateral recess. Moderate right neural foraminal stenosis. Severe left neural foraminal stenosis. L5-S1: Loss of disc height with a diffuse disc osteophyte complex. Appears to be postsurgical changes from a right hemilaminectomy. Moderate facet arthropathy. Mild spinal canal stenosis with  narrowing of the right lateral recess. Severe right neural foraminal stenosis. Mild left neural foraminal stenosis.     CONCLUSION: 1.  No acute fracture. 2.  Right L5 pars defect, unchanged. 3.  Advanced multilevel degenerative disc and facet disease. 4.  At L3-L4 there is mild to moderate spinal canal narrowing with mild to moderate left and mild right neural foraminal narrowing. 5.  At L4-L5 there is moderate spinal canal stenosis with narrowing of the right lateral recess. Severe left and moderate right neural foraminal narrowing. 6.  At L5-S1 there is mild spinal canal narrowing with narrowing of the right lateral recess. Severe right and mild left neural foraminal narrowing. 7.  Aortic aneurysmal dilatation to 3.0 cm, similar to 3/16/2016.    Xr Chest Ap    Result Date: 7/9/2017  XR CHEST AP 7/9/2017 3:30 PM INDICATION: Fall. Chest pain. COMPARISON: None. FINDINGS: Tiny right apical and lateral pneumothorax. Possibility would include rib fracture or sequela of emphysema seen on prior CT as an etiology (though no overt rib fractures noted on radiography). Minimal basilar atelectasis or scarring. Otherwise,  the lungs are clear. Stable cardiomediastinal silhouette. Aortic atherosclerosis. Findings verbally communicated [facility] ER physician Lewis at 3:44 PM on 7/9/2017.     Ct Hip Without Contrast Left    Result Date: 7/9/2017  CT LEFT HIP 7/9/2017 4:30 PM INDICATION: Fall, pain. TECHNIQUE: Noncontrast. Axial, sagittal and coronal thin-section reconstruction. Dose reduction techniques were used. COMPARISON: Plain films 07/09/2017 FINDINGS: There is an irregular transversely oriented fracture through the left greater trochanter extending to the base of the greater trochanter posteriorly, the proximal fragment is distracted and displaced superiorly, posteriorly, and medially by as much as 1 cm. There is a subtle nondisplaced fracture extending from the posterior aspect of the greater trochanteric fracture  into the intertrochanteric region along the posterior cortex, with extension to the base of the lesser trochanter through the anterior cortex, best seen on series 2 image 58 and 59, and series 5 image 42 and 43. There is soft tissue stranding and hemorrhage adjacent to the fracture. No additional fracture. Mild bony demineralization. Moderate degenerative change left hip joint and mild chondrocalcinosis. Degenerative change of the pubic symphysis. Degenerative change left SI joint and visualized lumbar spine.     CONCLUSION: 1.  Displaced fracture through the left greater trochanter. There is involvement of the intertrochanteric region with a nondisplaced component to the fracture extending into the intertrochanteric region to the base of the lesser trochanter.       Cardiographics:   EKG Results: personally reviewed.   EKG: normal EKG x2, normal sinus rhythm, QTc 450s    This note was created with help of Dragon dictation system. Grammatical /typing errors are not intentional.    Prabha Lima

## 2021-07-01 NOTE — PROCEDURES
"Procedures by Nico Adams RN at 7/11/2017  7:49 PM     Author: Nico Adams RN Service: -- Author Type: Registered Nurse    Filed: 7/11/2017  7:53 PM Date of Service: 7/11/2017  7:49 PM Status: Signed    : Nico Adams RN (Registered Nurse)     Procedure Orders    1. Insert PICC [[id number]] ordered by Wellington Viveros MD at 07/11/17 1628           Procedures    1. PICC AND MIDLINE TEAM LINE INSERTION [IVT34 (Custom)]                 PICC Line Insertion Procedure Note  Pt. Name: Jhoana Ramirez  MRN:        [id number]    Procedure: Insertion of a  triple Lumen  5 fr  Bard SOLO (valved) Power PICC, Lot number [id number]    Indications: vascular access    Contraindications : none noted    Procedure Details   Patient identified with 2 identifiers and \"Time Out\" conducted.  .     Central line insertion bundle followed: hand hygeine performed prior to procedure, site cleansed with cholraprep, hat, mask, sterile gloves,sterile gown worn, patient draped with maximum barrier head to toe drape, sterile field maintained.    5 ml 1% Lidocaine administered sq to the insertion site. A 5 Fr PICC was inserted into the basilic vein of the left arm with ultrasound guidance. One attempt(s) required to access vein.   Catheter threaded without difficulty. Good blood return noted.    Modified Seldinger Technique used for insertion.    Catheter secured with Statlock, biopatch and Tegaderm dressing applied.    Findings:  Total catheter length  45 cm, with 0 cm exposed. Mid upper arm circumference is 29 cm. Catheter was flushed with 30 cc NS. Patient  tolerated procedure well.    Tip placement verified by 3cg tip verification technology. Tip placement in the SVC.    CLABSI prevention brochure left at bedside.    Patient's primary RN notified PICC is ready for use.    Comments:  Attempt was initially done on right arm, but unable to access basilic or brachial vein successfully.  Patient moving around a lot, Nursing staff " updated and MD order medication for attempt to left arm.            Nico Adams, RN    [facility]  [phone]

## 2021-08-03 PROBLEM — A41.9 SEPSIS (H): Status: RESOLVED | Noted: 2017-01-01 | Resolved: 2017-01-01
